# Patient Record
Sex: MALE | Race: WHITE | NOT HISPANIC OR LATINO | Employment: OTHER | URBAN - METROPOLITAN AREA
[De-identification: names, ages, dates, MRNs, and addresses within clinical notes are randomized per-mention and may not be internally consistent; named-entity substitution may affect disease eponyms.]

---

## 2017-03-23 ENCOUNTER — OFFICE VISIT (OUTPATIENT)
Dept: NEUROLOGY | Facility: CLINIC | Age: 62
End: 2017-03-23

## 2017-03-23 VITALS
BODY MASS INDEX: 22.23 KG/M2 | DIASTOLIC BLOOD PRESSURE: 83 MMHG | SYSTOLIC BLOOD PRESSURE: 144 MMHG | WEIGHT: 155.3 LBS | HEIGHT: 70 IN | HEART RATE: 78 BPM

## 2017-03-23 DIAGNOSIS — I45.10 RBBB: ICD-10-CM

## 2017-03-23 DIAGNOSIS — G20.A1 PARALYSIS AGITANS (H): ICD-10-CM

## 2017-03-23 DIAGNOSIS — G25.81 RESTLESS LEG SYNDROME: ICD-10-CM

## 2017-03-23 DIAGNOSIS — F29 PSYCHOSIS, UNSPECIFIED PSYCHOSIS TYPE (H): Primary | ICD-10-CM

## 2017-03-23 DIAGNOSIS — G20.A1 PARKINSON'S DISEASE (H): ICD-10-CM

## 2017-03-23 DIAGNOSIS — I45.2 BIFASCICULAR BLOCK: ICD-10-CM

## 2017-03-23 PROBLEM — R94.31 ABNORMAL ELECTROCARDIOGRAM: Status: ACTIVE | Noted: 2017-03-23

## 2017-03-23 PROBLEM — R94.31 PROLONGED Q-T INTERVAL ON ECG: Status: ACTIVE | Noted: 2017-03-23

## 2017-03-23 RX ORDER — CARBIDOPA AND LEVODOPA 25; 100 MG/1; MG/1
TABLET ORAL
Qty: 630 TABLET | Refills: 3 | Status: SHIPPED | OUTPATIENT
Start: 2017-03-23 | End: 2017-05-30

## 2017-03-23 RX ORDER — ROPINIROLE 4 MG/1
TABLET, FILM COATED, EXTENDED RELEASE ORAL
Qty: 90 TABLET | Refills: 3 | Status: SHIPPED | OUTPATIENT
Start: 2017-03-23 | End: 2017-06-29

## 2017-03-23 RX ORDER — QUETIAPINE FUMARATE 25 MG/1
25 TABLET, FILM COATED ORAL
Qty: 30 TABLET | Refills: 11 | Status: SHIPPED | OUTPATIENT
Start: 2017-03-23 | End: 2017-11-21

## 2017-03-23 RX ORDER — TRIHEXYPHENIDYL HYDROCHLORIDE 2 MG/1
TABLET ORAL
Qty: 360 TABLET | Refills: 3 | Status: SHIPPED | OUTPATIENT
Start: 2017-03-23 | End: 2017-06-28

## 2017-03-23 RX ORDER — CARBIDOPA AND LEVODOPA 50; 200 MG/1; MG/1
TABLET, EXTENDED RELEASE ORAL
Qty: 90 TABLET | Refills: 3 | Status: SHIPPED | OUTPATIENT
Start: 2017-03-23 | End: 2017-05-30

## 2017-03-23 ASSESSMENT — PAIN SCALES - GENERAL: PAINLEVEL: NO PAIN (0)
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
## 2017-03-23 NOTE — MR AVS SNAPSHOT
After Visit Summary   3/23/2017    Eamon Whalen    MRN: [id number]           Patient Information     Date Of Birth          1955        Visit Information        Provider Department      3/23/2017 2:40 PM Edison Villasenor MD [facility] Neurology        Today's Diagnoses     Psychosis, unspecified psychosis type    -  1    Paralysis agitans (H)        Parkinson's disease (H)        Restless leg syndrome          Care Instructions    PATIENT: Eamon Whalen    : 1955    MERCEDES: 2017    Parkinson  Tremors are better.   Was in the hospital in August  Was seen by me 2016.      Artane 2mg 3/day - may be 4/day  Take 1 tab (2mg) at 7 am, 11am-12noon, 4pm and may take 8-9pm   == 4/day    requip 4mg XL at 9pm  Sinemet 25/100 2-2-2-1  - at 7 am, 11am-12noon, 4pm and 8-9pm   Sinemet CR 50/200 at 8-9pm    avastatin  Eye drops    His older sister Christopher Mckeon    He came by [employer]    He has had unusual dreams - his words vs hallucinations    2 out of the 3 weekends - he states he comes in the house and has a goofy vision of a horror or film actor. This is something that he may see - but states it is a dream.   At breakfast time he has had people coming in and keeping an eye on him during the night.   There are people who are there but they won't talk to him and it is hard to get them out of the house.   He has these when he is awake     PLAN  ECG to check his QTc interval as antipsychotics such as seroquel or/and nuplazid can cause prolongation of this     May consider seroquel (quetiapine) at 25mg at night 8-9pm to see if this diminishes the hallucinations and if there are side effects such as sleepiness.      Alternatively may consider the use of nuplazid (pimavanserin) - will need Jeaneth's help when she returns next week    Continue on requip, sinemet, artane for now    Return back in 3 months with Roshni Gonzales NP            Follow-ups after your visit        Follow-up notes from  your care team     Return in about 6 months (around 9/23/2017).      Your next 10 appointments already scheduled     Jul 17, 2017  2:20 PM CDT   (Arrive by 2:05 PM)   Return Movement Disorder with K[facility] ([facility])    [address]  [address] [place] [id number] [phone]              Future tests that were ordered for you today     Open Future Orders        Priority Expected Expires Ordered    EKG 12-lead complete with read (future) Routine  3/23/2018 3/23/2017            Who to contact     Please call your clinic at [phone] to:    Ask questions about your health    Make or cancel appointments    Discuss your medicines    Learn about your test results    Speak to your doctor   If you have compliments or concerns about an experience at your clinic, or if you wish to file a complaint, please contact [facility] Patient Relations at [phone] or email us at [email or web address]         Additional Information About Your Visit        [employer] (acquired by [employer] in 2014) Information     [employer] gives you secure access to your electronic health record. If you see a primary care provider, you can also send messages to your care team and make appointments. If you have questions, please call your primary care clinic.  If you do not have a primary care provider, please call [phone] and they will assist you.      [employer] is an electronic gateway that provides easy, online access to your medical records. With [employer], you can request a clinic appointment, read your test results, renew a prescription or communicate with your care team.     To access your existing account, please contact your [facility] or call [phone] for assistance.        Care EveryWhere ID     This is your Care EveryWhere ID. This could be used by other organizations to access your [facility]  "records  [id number]        Your Vitals Were     Pulse Height BMI (Body Mass Index)             78 1.778 m (5' 10\") 22.28 kg/m2          Blood Pressure from Last 3 Encounters:   03/23/17 144/83   08/26/16 (!) 163/96   06/29/16 (!) 161/96    Weight from Last 3 Encounters:   03/23/17 70.4 kg (155 lb 4.8 oz)   08/26/16 70.3 kg (155 lb)   06/29/16 73.4 kg (161 lb 12.8 oz)                 Today's Medication Changes          These changes are accurate as of: 3/23/17  3:12 PM.  If you have any questions, ask your nurse or doctor.               Start taking these medicines.        Dose/Directions    pimavanserin 17 MG Tabs tablet   Commonly known as:  NUPLAZID   Used for:  Paralysis agitans (H), Psychosis, unspecified psychosis type   Started by:  Edison Villasenor MD        Dose:  34 mg   Take 2 tablets (34 mg) by mouth daily   Quantity:  180 tablet   Refills:  3       QUEtiapine 25 MG tablet   Commonly known as:  SEROQUEL   Used for:  Psychosis, unspecified psychosis type, Parkinson's disease (H), Paralysis agitans (H)   Started by:  Edison Villasenor MD        Dose:  25 mg   Take 1 tablet (25 mg) by mouth nightly as needed   Quantity:  30 tablet   Refills:  11         These medicines have changed or have updated prescriptions.        Dose/Directions    * carbidopa-levodopa  MG per CR tablet   Commonly known as:  SINEMET CR   This may have changed:  Another medication with the same name was changed. Make sure you understand how and when to take each.   Used for:  Paralysis agitans (H)   Changed by:  Edison Villasenor MD        1 tab @ 9pm   Quantity:  90 tablet   Refills:  3       * carbidopa-levodopa  MG per tablet   Commonly known as:  SINEMET   This may have changed:  additional instructions   Used for:  Paralysis agitans (H)   Changed by:  Edison Villasenor MD        2 tab at 7am, 11 am & 4 pm; 1 tablet at 8-9pm.    == 7/day   Quantity:  630 tablet   Refills:  3       Ropinirole HCl 4 MG Tb24 "   Commonly known as:  REQUIP XL   This may have changed:    - how much to take  - how to take this  - when to take this  - additional instructions   Used for:  Parkinson's disease (H), Restless leg syndrome   Changed by:  Edison Villasenor MD        1 tab at 8-9pm   Quantity:  90 tablet   Refills:  3       trihexyphenidyl 2 MG tablet   Commonly known as:  ARTANE   This may have changed:  additional instructions   Used for:  Paralysis agitans (H)   Changed by:  Edison Villasenor MD        Take 1 tab (2mg) at 7 am, 11am-12noon, 4pm and may take 8-9pm   == 4/day   Quantity:  360 tablet   Refills:  3       * Notice:  This list has 2 medication(s) that are the same as other medications prescribed for you. Read the directions carefully, and ask your doctor or other care provider to review them with you.         Where to get your medicines      These medications were sent to [id number], [place] - [address]  [address]) [place] [address]     Phone:  [phone]     carbidopa-levodopa  MG per tablet    carbidopa-levodopa  MG per CR tablet    QUEtiapine 25 MG tablet    Ropinirole HCl 4 MG Tb24    trihexyphenidyl 2 MG tablet         Some of these will need a paper prescription and others can be bought over the counter.  Ask your nurse if you have questions.     Bring a paper prescription for each of these medications     pimavanserin 17 MG Tabs tablet                Primary Care Provider Office Phone # Fax #    Bernardino John [phone]       [facility] [address]  [facility] [address]        Thank you!     Thank you for choosing [facility]  for your care. Our goal is always to provide you with excellent care. Hearing back from our patients is one way we can continue to improve our services. Please take a few minutes to complete the written survey that you may receive in the mail after your visit with us.  Thank you!             Your Updated Medication List - Protect others around you: Learn how to safely use, store and throw away your medicines at www.disposemymeds.org.          This list is accurate as of: 3/23/17  3:12 PM.  Always use your most recent med list.                   Brand Name Dispense Instructions for use    acetaminophen 325 MG tablet    TYLENOL    100 tablet    Take 2 tablets (650 mg) by mouth every 6 hours as needed for mild pain       Albuterol Sulfate 108 (90 BASE) MCG/ACT Aepb      Inhale 2 puffs into the lungs 4 times daily as needed       aspirin 325 MG tablet     180 tablet    Take 1 tablet (325 mg) by mouth daily       * carbidopa-levodopa  MG per CR tablet    SINEMET CR    90 tablet    1 tab @ 9pm       * carbidopa-levodopa  MG per tablet    SINEMET    630 tablet    2 tab at 7am, 11 am & 4 pm; 1 tablet at 8-9pm.    == 7/day       * ketoconazole 2 % shampoo    NIZORAL    120 mL    Apply to the affected area and wash off after 5 minutes.       * ketoconazole 2 % cream    NIZORAL    60 g    Apply topically daily Apply topical daily       NONFORMULARY      avastin injection of the right eye       pimavanserin 17 MG Tabs tablet    NUPLAZID    180 tablet    Take 2 tablets (34 mg) by mouth daily       QUEtiapine 25 MG tablet    SEROQUEL    30 tablet    Take 1 tablet (25 mg) by mouth nightly as needed       Ropinirole HCl 4 MG Tb24    REQUIP XL    90 tablet    1 tab at 8-9pm       trihexyphenidyl 2 MG tablet    ARTANE    360 tablet    Take 1 tab (2mg) at 7 am, 11am-12noon, 4pm and may take 8-9pm   == 4/day       XALATAN 0.005 % ophthalmic solution   Generic drug:  latanoprost     2.5 mL    Place 1 drop into the right eye At Bedtime One drop in the right eye in the morning and evening       ZANTAC 150 MG tablet   Generic drug:  ranitidine     180 tablet    Take 1 tablet (150 mg) by mouth 2 times daily       * Notice:  This list has 4 medication(s) that are the same as other medications  prescribed for you. Read the directions carefully, and ask your doctor or other care provider to review them with you.
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
## 2017-03-23 NOTE — PATIENT INSTRUCTIONS
PATIENT: Eamon Whalen    : 1955    MERCEDES: 2017    Parkinson  Tremors are better.   Was in the hospital in August  Was seen by me 2016.      Artane 2mg 3/day - may be 4/day  Take 1 tab (2mg) at 7 am, 11am-12noon, 4pm and may take 8-9pm   == 4/day    requip 4mg XL at 9pm  Sinemet 25/100 2-2-2-1  - at 7 am, 11am-12noon, 4pm and 8-9pm   Sinemet CR 50/200 at 8-9pm    avastatin  Eye drops    His older sister Christopher Mckeon    He came by [employer] taxi    He has had unusual dreams - his words vs hallucinations    2 out of the 3 weekends - he states he comes in the house and has a goofy vision of a horror or film actor. This is something that he may see - but states it is a dream.   At breakfast time he has had people coming in and keeping an eye on him during the night.   There are people who are there but they won't talk to him and it is hard to get them out of the house.   He has these when he is awake     PLAN  ECG to check his QTc interval as antipsychotics such as seroquel or/and nuplazid can cause prolongation of this     May consider seroquel (quetiapine) at 25mg at night 8-9pm to see if this diminishes the hallucinations and if there are side effects such as sleepiness.      Alternatively may consider the use of nuplazid (pimavanserin) - will need Jeaneth's help when she returns next week    Continue on requip, sinemet, artane for now    Return back in 3 months with Roshni Gonzales NP

## 2017-03-23 NOTE — NURSING NOTE
Chief Complaint   Patient presents with     RECHECK     P RETURN MOVEMENT DISORDER     Teresa Ag MA

## 2017-03-23 NOTE — Clinical Note
3/23/2017       RE: Eamon Whalen  [address]  [place] [address]     Dear Colleague,    Thank you for referring your patient, Eamon Whalen, to the [facility]UROLOG[facility] at [facility]. Please see a copy of my visit note below.    No notes on file    Again, thank you for allowing me to participate in the care of your patient.      Sincerely,    Edison Villasenor MD

## 2017-03-24 ENCOUNTER — TELEPHONE (OUTPATIENT)
Dept: NEUROLOGY | Facility: CLINIC | Age: 62
End: 2017-03-24

## 2017-03-24 DIAGNOSIS — I45.10 BUNDLE BRANCH BLOCK, RIGHT: Primary | ICD-10-CM

## 2017-03-24 DIAGNOSIS — I45.2 BIFASCICULAR BLOCK: ICD-10-CM

## 2017-03-24 NOTE — TELEPHONE ENCOUNTER
I received a message from Dr Villasenor regarding this patient's EKG results, stating that he has a bundle branch block; Dr Villasenor stated that the patient should not start the Seroquel that was prescribed to him, and to have a Cardiology consult placed; Dr Villasenor already placed Cardiology referral; I called Eamon and he knows not to take the Seroquel, although he states that he did take one last night; I advised him just to not take anymore; I told Eamon to call us if he has not heard from Cardiology to schedule an appointment by early next week.    Vicki Bazzi, MS RN Care Coordinator

## 2017-03-27 ENCOUNTER — TELEPHONE (OUTPATIENT)
Dept: NEUROLOGY | Facility: CLINIC | Age: 62
End: 2017-03-27

## 2017-03-27 NOTE — TELEPHONE ENCOUNTER
Spoke to patient and reiterated the importance of not starting the Seroquel. Told him that the EKG showed a bundle branch block that could interfere with his heart's rhythm and that Seroquel can also affect rhythm. He repeated back the instruction to not take any Seroquel.    He stated that it would be more convenient to see a cardiologist in [place]. Called [facility]'s referral system and faxed the referral, demographics, EKG and report, and Dr. Villasenor's last office visit note to [phone].    He talked about his troubles with the DMV and getting approval to drive. I told him that the best option is for him to have an  evaluation as Dr. Villasenor does not make decisions regarding driving ability for his patients. He verbalized understanding with this information.

## 2017-04-12 ENCOUNTER — TELEPHONE (OUTPATIENT)
Dept: NEUROLOGY | Facility: CLINIC | Age: 62
End: 2017-04-12

## 2017-04-28 ENCOUNTER — TELEPHONE (OUTPATIENT)
Dept: NEUROLOGY | Facility: CLINIC | Age: 62
End: 2017-04-28

## 2017-04-28 NOTE — TELEPHONE ENCOUNTER
----- Message from Jeaneht Abdullahi RN sent at 4/28/2017  9:34 AM CDT -----  Regarding: FW: Pt Received DMV Form; Requesting Call  Contact: [phone]  Please let him know that we don't do these letters. That he can have an independent driving evaluation done and he can submit it to the DMV. Dr. Villasenor may write a letter if the evaluation states he is safe to drive but he does not make that determination.    Thank you!  Jeaneth  ----- Message -----     From: Ashish Judd     Sent: 4/28/2017   9:27 AM       To: Jeaneth Abdullahi RN  Subject: Pt Received DMV Form; Requesting Call            Pt's #: [phone]  Pt of Dr. Villasenor    Pt called in requesting a call back to discuss a form he received from the DMV he has to submit to them by 5/5/17. The form states that he needs to have information from Dr. Villasenor stating he can safely operate a motor vehicle in order for him to keep his driving privileges. Please contact Pt to discuss.    Ashish Segura in the Call Center    Please DO NOT send this message and/or reply back to sender.  Call Center Representatives DO NOT respond to messages.

## 2017-05-02 NOTE — TELEPHONE ENCOUNTER
Patient calling about DMV form due 05/05/17.  Patient given DMV drivers evaluation phone number [phone].  Instructed to call DMV for a 30 day extension.  Patient lives in [place] and will visit local DMV for drivers evaluation   location near his home.  Instructed to fax approved drivers evaluation form to [facility] neurology  for Dr Villasenor.

## 2017-05-30 DIAGNOSIS — G20.A1 PARALYSIS AGITANS (H): ICD-10-CM

## 2017-05-30 RX ORDER — CARBIDOPA AND LEVODOPA 25; 100 MG/1; MG/1
TABLET ORAL
Qty: 630 TABLET | Refills: 1 | Status: SHIPPED | OUTPATIENT
Start: 2017-05-30 | End: 2017-07-05

## 2017-05-30 RX ORDER — CARBIDOPA AND LEVODOPA 50; 200 MG/1; MG/1
TABLET, EXTENDED RELEASE ORAL
Qty: 90 TABLET | Refills: 0 | Status: SHIPPED | OUTPATIENT
Start: 2017-05-30 | End: 2017-07-05

## 2017-06-01 ENCOUNTER — TELEPHONE (OUTPATIENT)
Dept: NEUROLOGY | Facility: CLINIC | Age: 62
End: 2017-06-01

## 2017-06-01 NOTE — TELEPHONE ENCOUNTER
[facility] Prior Authorization Team   Phone: [phone]  Fax: [phone]    PA Initiation    Medication: Ropinirole HCl (REQUIP XL) 4 MG TB24  Insurance Company: [employer] - Phone [phone] Fax [phone]  Pharmacy Filling the Rx: [id number] - [facility], [place] - [address] [place] [address]  Filling Pharmacy Phone: [phone]  Filling Pharmacy Fax:    Start Date: 6/1/2017

## 2017-06-05 NOTE — TELEPHONE ENCOUNTER
PRIOR AUTHORIZATION DENIED    Medication: Ropinirole HCl (REQUIP XL) 4 MG TB24 - DENIED    Denial Date: 6/3/2017    Denial Rational: PA has been denied as patient has only try/failed one alternative and must try one more drug: pramipexole IR tablets, ropinirole IR tablets. If an appeal is desired please let the PA team know when a letter of medical necessity has been written.        Appeal Information:

## 2017-06-06 NOTE — TELEPHONE ENCOUNTER
"Called patient to let him know where this is in the process. No answer, left voice mail with information on what is being done. Last two notes were printed and along with demographic sheet, faxed to [phone] marked \"URGENT Additional Information\" as I was instructed.  "

## 2017-06-06 NOTE — TELEPHONE ENCOUNTER
Called [employer] and the wait time is 26 minutes. They will call me back.    Patient has been on this medication since 2016:  Ropinirole HCl (REQUIP XL) 4 MG TB24 90 tablet 3 3/23/2017  No   Si tab at 8-9pm      He has been on:    Ropinirole HCl 8 MG TB24 (Discontinued) 180 tablet 3 2013 --   Si per day     Ropinirole HCl 12 MG TB24 (Discontinued) 90 tablet 3 2013 --   Sig: Take one daily     MIRAPEX 0.5 MG OR TABS (Discontinued) 90 0 2007 9/15/2011 --   Si TABLET 3 TIMES DAILY     Patient has been stable on the current med dose and type.

## 2017-06-06 NOTE — TELEPHONE ENCOUNTER
Spoke to Laya ROLLINS at [facility] she was not helpful and stated I have to submit some kind of documentation for an appeal. I asked for a direct number to speak with someone directly as the patient is out of his medication. She gave me the wrong number at [email or web address] it was their IT support number. IT gave me this number: [phone] to call.    Spoke with Jaycob at [email or web address] to see if I can get an override, no one can help me directly.    URGENT     Additional information to be faxed [phone]. This note will be faxed to them along with the documentation below.

## 2017-06-07 NOTE — TELEPHONE ENCOUNTER
Received fax from [employer] asking for the original prior authorization form and documentation to be sent. Again, faxed all documentation (11 pages).

## 2017-06-07 NOTE — TELEPHONE ENCOUNTER
Caller name: [facility] [place] [phone]      Called Snow and let her know that we are waiting for the approval from his insurance and that he will have to pay out of pocket per pill until we have it.

## 2017-06-15 NOTE — TELEPHONE ENCOUNTER
Called [employer] and spoke with rep Martinez. Rep stated that the information has been received and was submitted as an urgent appeal. Appeal is currently in process and being reviewed.

## 2017-06-19 ENCOUNTER — TELEPHONE (OUTPATIENT)
Dept: NEUROSURGERY | Facility: CLINIC | Age: 62
End: 2017-06-19

## 2017-06-19 NOTE — TELEPHONE ENCOUNTER
Received 43 pages from [facility] re: documentation for the prior authorization process of Requip for this patient. There were no instructions for us. Prior authorization is being handled by the PA team.

## 2017-06-28 DIAGNOSIS — G20.A1 PARALYSIS AGITANS (H): ICD-10-CM

## 2017-06-28 RX ORDER — TRIHEXYPHENIDYL HYDROCHLORIDE 2 MG/1
TABLET ORAL
Qty: 360 TABLET | Refills: 0 | Status: SHIPPED | OUTPATIENT
Start: 2017-06-28 | End: 2017-10-26

## 2017-06-28 NOTE — TELEPHONE ENCOUNTER
last appointment 03/23/17  next appointment 07/17/17    Patient completely out of medication and   waiting at pharmacy to pick it up.

## 2017-06-29 DIAGNOSIS — G25.81 RESTLESS LEG SYNDROME: ICD-10-CM

## 2017-06-29 DIAGNOSIS — G20.A1 PARKINSON'S DISEASE (H): ICD-10-CM

## 2017-06-29 RX ORDER — ROPINIROLE 4 MG/1
TABLET, FILM COATED, EXTENDED RELEASE ORAL
Qty: 30 TABLET | Refills: 11 | Status: SHIPPED | OUTPATIENT
Start: 2017-06-29 | End: 2017-11-21

## 2017-06-29 NOTE — TELEPHONE ENCOUNTER
Spoke with a representative at [facility] who stated that the appeal would be reviewed within 72 hours. I told her that this was unacceptable as we have already been waiting over 20 days for the review, the representative stated that she would have an answer today.

## 2017-06-30 NOTE — TELEPHONE ENCOUNTER
Representative Sky from [facility] stated that this appeal was missing information when faxed on 06/08/2017 and 06/15/2017. Sky stated that [facility] mailed a number of letters stating what information was missing to the clinic. The representative states that the appeal is under review however the appeal does not meet urgent status so it is still pending and will be following [facility] time frame for appeals. There is a great deal of confusion between the representatives at [facility] and those at [facility], they have not been helpful and do not seem to know who handles appeals or where they should be faxed.

## 2017-07-05 DIAGNOSIS — G20.A1 PARALYSIS AGITANS (H): ICD-10-CM

## 2017-07-05 RX ORDER — CARBIDOPA AND LEVODOPA 50; 200 MG/1; MG/1
TABLET, EXTENDED RELEASE ORAL
Qty: 90 TABLET | Refills: 0 | Status: SHIPPED | OUTPATIENT
Start: 2017-07-05 | End: 2017-10-26

## 2017-07-05 RX ORDER — CARBIDOPA AND LEVODOPA 25; 100 MG/1; MG/1
TABLET ORAL
Qty: 630 TABLET | Refills: 0 | Status: SHIPPED | OUTPATIENT
Start: 2017-07-05 | End: 2017-10-26

## 2017-07-06 ENCOUNTER — TELEPHONE (OUTPATIENT)
Dept: NEUROSURGERY | Facility: CLINIC | Age: 62
End: 2017-07-06

## 2017-07-06 NOTE — TELEPHONE ENCOUNTER
A letter was received from [facility] and [facility] and [employer] regarding this patient's appeal for approval of Ropinirole Hydrochloride 4 mg extended release tablets. The letter states that a request for a fast appeal can not be granted at this time. Fast appeals are only for times when normal deadlines could cause serious harm to a patient's health or hurt the ability to function. Letter scanned by this writer into this patient's chart.

## 2017-07-10 NOTE — TELEPHONE ENCOUNTER
PA Initiation    Medication: Ropinirole HCl (REQUIP XL) 4 MG TB24 - DENIED  Insurance Company: [employer] - Phone [phone] Fax [phone]  Pharmacy Filling the Rx: [id number], [place] - [address] [place] [address]  Filling Pharmacy Phone: [phone]  Filling Pharmacy Fax:    Start Date: 6/30/2017

## 2017-07-24 ENCOUNTER — TELEPHONE (OUTPATIENT)
Dept: NEUROSURGERY | Facility: CLINIC | Age: 62
End: 2017-07-24

## 2017-07-24 NOTE — TELEPHONE ENCOUNTER
Letter received from [facility] and [facility] and [employer]- appeal was submitted for the denial of ropinirole hydrochloride by this insurance company. Appeal was approved. The drug is approved for this patient for one year from July 10th, 2017 to July 10th, 2018. Letter scanned into patient's chart by this writer.

## 2017-09-15 ENCOUNTER — TELEPHONE (OUTPATIENT)
Dept: NEUROLOGY | Facility: CLINIC | Age: 62
End: 2017-09-15

## 2017-09-15 NOTE — TELEPHONE ENCOUNTER
"Called patient to discuss. Patient believes himself to be on a \"hefty\" dose of meds at bedtime with the carbidopa/levodopa  CR and the ropinirole XL 4 mg.    November 30 appointment with Dr. Villasenor. Tremor is gone, \"it's a miracle.\" \"I've been doing very well and I want to keep it that way.\"    Patient could not focus on what the issue is that he was calling about. 15 minutes spent on call.  "

## 2017-09-15 NOTE — TELEPHONE ENCOUNTER
----- Message from Tangela Mulligan sent at 9/13/2017  8:32 AM CDT -----  Regarding: Pt has questions  Contact: [phone]  Pt Ph # [phone],    Pt of Dr Villasenor,    Pt requests a call back from you, Pt has questions, he has a medication question about what he should and should not be taking and he also wants to know if the [facility] has any kind of driving test or driving asssement to see if it is still medically safe for him to drive,    ThanksTangela in Call Center    Please DO NOT send this message and/or reply back to sender.  Call Center Representatives DO NOT respond to messages.

## 2017-09-25 ENCOUNTER — TELEPHONE (OUTPATIENT)
Dept: NEUROLOGY | Facility: CLINIC | Age: 62
End: 2017-09-25

## 2017-09-25 DIAGNOSIS — G20.A1 PARALYSIS AGITANS (H): Primary | ICD-10-CM

## 2017-09-25 NOTE — TELEPHONE ENCOUNTER
Spoke to patient regarding his carbidopa/levodopa  and his ropinirole 4 mg XL. He does not want to lose more brain cells. I explained that the medications do not cause a loss of brain cells but rather the Parkinson's disease progresses and more dopamine producing cells are lost. I stated that he will need to come to his appointment in November with a list of questions and 1 or 2 goals for the visit.

## 2017-09-25 NOTE — TELEPHONE ENCOUNTER
----- Message from Kayla Madrigal LPN sent at 9/21/2017  3:51 PM CDT -----  Regarding: request  Contact: [phone]  Caller name: patient [phone]    Treating provider/specialty: [place]    Nurse:    Best time to return call:    Message left?     Description of issue/question:  patient has asked many times for a letter  to be sent to [facility]. Patient has discussed this issue with MD Villasenor before.  Doesn't have a DMV form but needs a letter sent to state   saying that he can now drive and no further visual issues and parkinsons.  patient has no tremor now and is doing great.  Said his drivers license is 'on hold' presently.   fax to [phone].  Please mail letter copy to home address.  Call patient when sent.

## 2017-09-25 NOTE — TELEPHONE ENCOUNTER
"----- Message from Sis Nunez sent at 9/21/2017 11:27 AM CDT -----  Regarding: PT NEEDS LETTER W/OK TO DRIVE  Contact: [phone]  Call from PT stating that there is going to be a request sent to Dr. Villasenor for a letter stating that PT is OK to drive.  PT is asking \"that the letter remain positive as he needs to be able to spend time with his family and grandchildren.\"  Please call PT at [phone].      Thank You,  Sis    Please DO NOT send this message and/or reply back to sender.  Call Center Representatives DO NOT respond to messages.      "

## 2017-09-25 NOTE — TELEPHONE ENCOUNTER
Called patient to discuss. He does not understand why he can't drive when everyone else seems to be able to drive with one eye. States he had the evaluation in the [facility], he did not pass. I explained that Dr. Villasenor cannot write a letter stating he is safe to drive since he has no idea if he is or not. Patient needs a comprehensive driving evaluation. Gave him the number to [facility]  Evaluations: [phone]. Began discussing what the assessment involves:    Assessment Services    The two-part comprehensive  assessment consists of clinical and in-vehicle assessments.    Clinical assessment    Vision testing  Reaction time screening  Memory and problem solving  Upper and lower body strength and coordination  Cognitive processing skills  In-vehicle services    Assessment in a minivan or sedan  Prescription for adaptive driving equipment  Private driving lessons in a van or sedan  Vehicle use for the State road test.  All information is kept confidential, but we encourage involvement with your physician and family. With your permission, your physician is given a copy of your completed assessment results.

## 2017-09-25 NOTE — TELEPHONE ENCOUNTER
----- Message from Kayla Madrigal LPN sent at 9/25/2017 10:12 AM CDT -----  Regarding: questions  Contact: [phone]  Caller name: [phone]    Treating provider/specialty: Stephanie/Hamilton    Nurse:    Best time to return call: anytime     Message left?     Description of issue/question:  regarding SINEMET CR)  MG  and ropenerole takes 1 each at HS -has occassional imbalance from ropinerole.  doesn't want to be dependent on sleeping medications.

## 2017-09-25 NOTE — TELEPHONE ENCOUNTER
Patient requested an order for a  evaluation sent to [facility]. Order and demographic sheet sent to [phone] (fax number received from [facility] ).

## 2017-09-25 NOTE — TELEPHONE ENCOUNTER
----- Message from Valentina Fisher sent at 9/21/2017  1:42 PM CDT -----  Regarding: Letter from Dr Villasenor on his driving  Contact: [phone]  Pt license has suspending and he is requesting that Dr Villasenor writes a letter on his behalf stating that it is okay for him to drive. I notice that you had a phone encounter on 09/15/2017 but I did not know if letter is being written or what I should tell pt. Please call back pt at [phone] to discuss. Thanks      MB    Please DO NOT send this message and/or reply back to sender.  Call Center Representatives DO NOT respond to messages.

## 2017-09-30 DIAGNOSIS — G20.A1 PARALYSIS AGITANS (H): ICD-10-CM

## 2017-10-01 DIAGNOSIS — G20.A1 PARALYSIS AGITANS (H): ICD-10-CM

## 2017-10-04 RX ORDER — TRIHEXYPHENIDYL HYDROCHLORIDE 2 MG/1
TABLET ORAL
Qty: 120 TABLET | Refills: 0 | OUTPATIENT
Start: 2017-10-04

## 2017-10-04 RX ORDER — CARBIDOPA AND LEVODOPA 50; 200 MG/1; MG/1
TABLET, EXTENDED RELEASE ORAL
Qty: 30 TABLET | Refills: 0 | OUTPATIENT
Start: 2017-10-04

## 2017-10-17 ENCOUNTER — TELEPHONE (OUTPATIENT)
Dept: NEUROLOGY | Facility: CLINIC | Age: 62
End: 2017-10-17

## 2017-10-17 NOTE — TELEPHONE ENCOUNTER
Not happy with his medications and how they are handling his balance. He believes the Requip lessened the trouble with balance. I explained that there really are not medications designed to help with balance but rather this is most likely his Parkinson's disease progressing unfortunately. He will want to discuss     1. Balance problems   2. Driving    I offered him an earlier appointment with Dr. Villasenor on 10/31 at 7:40 for 30 minutes. He will call back and let the call center know if he can confirm that appointment. If so, the call center should cancel the appointment on 11/30.

## 2017-10-26 DIAGNOSIS — G20.A1 PARALYSIS AGITANS (H): ICD-10-CM

## 2017-10-26 RX ORDER — CARBIDOPA AND LEVODOPA 25; 100 MG/1; MG/1
TABLET ORAL
Qty: 630 TABLET | Refills: 0 | Status: SHIPPED | OUTPATIENT
Start: 2017-10-26 | End: 2017-11-21

## 2017-10-26 RX ORDER — TRIHEXYPHENIDYL HYDROCHLORIDE 2 MG/1
TABLET ORAL
Qty: 120 TABLET | Refills: 0 | Status: SHIPPED | OUTPATIENT
Start: 2017-10-26 | End: 2017-11-21

## 2017-10-26 RX ORDER — CARBIDOPA AND LEVODOPA 50; 200 MG/1; MG/1
TABLET, EXTENDED RELEASE ORAL
Qty: 90 TABLET | Refills: 0 | Status: SHIPPED | OUTPATIENT
Start: 2017-10-26 | End: 2017-11-21

## 2017-11-06 ENCOUNTER — TELEPHONE (OUTPATIENT)
Dept: NEUROLOGY | Facility: CLINIC | Age: 62
End: 2017-11-06

## 2017-11-06 NOTE — TELEPHONE ENCOUNTER
"Patient called to share that he has an upcoming appointment with Dr. Villasenor on 11/16/2017 and that he would like to address his instability and driving because he is trying to preserve as much of his \"normal life\" as he can.  He acknowledges that his health is changing but feels that people in his community think he should be \"institutionalized\".  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  He notes that [place] comes from [place] and [place] farm.  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  His purpose for calling today was to share these concerns with someone in preparation for his upcoming appointment.      Writer advised patient to write a list of things that are important to him (ie: farm, driving, etc.) and a list of what his worries or concerns are.  Informed patient that our goal is to support him in living a safe and healthy life and it's important for us to have an open discussion about how we can best do this.  Patient agrees and appreciation for the ideas.  States that he will see Dr. Villasenor at his upcoming appointment.       Writer will pass this information along to Dr. Villasenor and his nurse, Jeaneth.      "

## 2017-11-17 ENCOUNTER — TELEPHONE (OUTPATIENT)
Dept: NEUROLOGY | Facility: CLINIC | Age: 62
End: 2017-11-17

## 2017-11-17 NOTE — TELEPHONE ENCOUNTER
----- Message from Charley Jones sent at 2017  8:42 AM CST -----  [employer] FORM FOR TRANSPORTATION  Contact: [phone]  Good Morning and Happy Friday!!        Eamon is requesting his  form for his transportation be signed by Dr. Villasenor. It's for the distance he travels for his appointments. He can be reached at [phone] (ok to leave a voicemail).  He is really anxious about it, since his appointment is 17.      Thanks!  Vee in Call Center

## 2017-11-17 NOTE — TELEPHONE ENCOUNTER
"Returned patient's call. He requested I send a transportation 30/60 exemption form to [facility] in order for him to have transportation to medical appointments [place] the \"[place]\". This was done and faxed to [facility] [phone].  "

## 2017-11-21 ENCOUNTER — OFFICE VISIT (OUTPATIENT)
Dept: NEUROLOGY | Facility: CLINIC | Age: 62
End: 2017-11-21

## 2017-11-21 VITALS
WEIGHT: 145 LBS | BODY MASS INDEX: 21.48 KG/M2 | HEIGHT: 69 IN | HEART RATE: 92 BPM | SYSTOLIC BLOOD PRESSURE: 141 MMHG | DIASTOLIC BLOOD PRESSURE: 78 MMHG

## 2017-11-21 DIAGNOSIS — I45.2 BIFASCICULAR BLOCK: ICD-10-CM

## 2017-11-21 DIAGNOSIS — F29 PSYCHOSIS, UNSPECIFIED PSYCHOSIS TYPE (H): ICD-10-CM

## 2017-11-21 DIAGNOSIS — G25.81 RESTLESS LEG SYNDROME: ICD-10-CM

## 2017-11-21 DIAGNOSIS — G20.A1 PARALYSIS AGITANS (H): ICD-10-CM

## 2017-11-21 DIAGNOSIS — G20.A1 PARKINSON'S DISEASE (H): Primary | ICD-10-CM

## 2017-11-21 RX ORDER — ROPINIROLE 4 MG/1
TABLET, FILM COATED, EXTENDED RELEASE ORAL
Qty: 90 TABLET | Refills: 3 | Status: SHIPPED | OUTPATIENT
Start: 2017-11-21 | End: 2018-02-20

## 2017-11-21 RX ORDER — TRIHEXYPHENIDYL HYDROCHLORIDE 2 MG/1
TABLET ORAL
Qty: 120 TABLET | Refills: 0 | Status: SHIPPED | OUTPATIENT
Start: 2017-11-21 | End: 2017-11-29

## 2017-11-21 RX ORDER — CARBIDOPA AND LEVODOPA 50; 200 MG/1; MG/1
TABLET, EXTENDED RELEASE ORAL
Qty: 90 TABLET | Refills: 3 | Status: SHIPPED | OUTPATIENT
Start: 2017-11-21 | End: 2018-02-20

## 2017-11-21 RX ORDER — QUETIAPINE FUMARATE 25 MG/1
25 TABLET, FILM COATED ORAL
Qty: 30 TABLET | Refills: 11 | COMMUNITY
Start: 2017-11-21 | End: 2018-02-20

## 2017-11-21 RX ORDER — CARBIDOPA AND LEVODOPA 25; 100 MG/1; MG/1
TABLET ORAL
Qty: 630 TABLET | Refills: 3 | Status: SHIPPED | OUTPATIENT
Start: 2017-11-21 | End: 2018-02-20

## 2017-11-21 ASSESSMENT — PAIN SCALES - GENERAL: PAINLEVEL: MILD PAIN (3)

## 2017-11-21 NOTE — TELEPHONE ENCOUNTER
"#1 Goal of visit today () is get the right meds at the right level - to decrease hallucinations and control Parkinson's disease symptoms.    4 weeks ago he had \"a buggaboo\". Boxes are all over and displaced due to roof being worked on. He wanted to get the people he was seeing to the [employer]. He wanted to make it clear that he was not going to help them anymore. The people are red, full grown adults, close to 10. At some points he has insight, other times he does not.    trihexyphenidyl (ARTANE) 2 MG tablet 120 tablet 0 10/26/2017  No   Sig: Take 1 tab (2mg) at 7 am, 11am-12noon, 4pm and may take 8-9pm   == 4/day    He is taking the above as ordered    Ropinirole HCl (REQUIP XL) 4 MG TB24 30 tablet 11 2017  No   Si tab at 8-9pm    He is taking the above as ordered    QUEtiapine (SEROQUEL) 25 MG tablet 30 tablet 11 3/23/2017  --   Sig: Take 1 tablet (25 mg) by mouth nightly as needed    He is not taking Seroquel - never started    pimavanserin (NUPLAZID) 17 MG TABS tablet 180 tablet 3 3/23/2017  --   Sig: Take 2 tablets (34 mg) by mouth daily    He is not taking Nuplazid - never started    carbidopa-levodopa (SINEMET CR)  MG per CR tablet 90 tablet 0 10/26/2017  No   Si tab @ 9pm    He is taking this as ordered    carbidopa-levodopa (SINEMET)  MG per tablet 630 tablet 0 10/26/2017  No   Si tab at 7am, 11 am & 4 pm; 1 tablet at 8-9pm.    == 7/day    He is taking this as ordered    Parkinson's disease symptoms are being handled very well.  "

## 2017-11-21 NOTE — Clinical Note
11/21/2017       RE: Eamon Whalen  [id number]  [place] [address]     Dear Colleague,    Thank you for referring your patient, Eamon Whalen, to the [facility] at [facility]. Please see a copy of my visit note below.    No notes on file    Again, thank you for allowing me to participate in the care of your patient.      Sincerely,    Edison Villasenor MD

## 2017-11-21 NOTE — PATIENT INSTRUCTIONS
PATIENT: Eamon Whalen  61 year old male     : 1955    MERCEDES: 2017    Acetaminophen  Albuterol  Aspirin  Sinemet carbidopa/levodopa CR 50/200 at bedtime   Sinemet carbidopa/levodopa 25/100 2-2-2-1  nizoral shampoo  nizoral cream  Latanoprost (xalatn) 0.005%  avastin injecdtion  pimavanserin (nuplazid) 17mg x 2 - never started   Quetiapine (seroquel) 25mg at night - never started.   Rantidine (zantac) 150mg 2/day  Ropinirole XL 4mg at 8-9pm  Trihexyphenidyl 2mg 7am 11-12 4pm 8-9pm     Present regimen     Meds                   7a 11a 4p 8-9p 9p       50/200 sinemet        1         25/100  sinemet 2 2  2  1            requip XL 4mg ropinirole       1         trihexy 2mg artane 1 1 1 1         seroquel 25mg    Not taking      pimavanserin 17mg    Not taking                             Over 50% of this visit was spent in patient care and care coordination.     History obtained from patient    Total visit time was 25 minutes    PLAN  Reduce the artane (trihexyphenidyl) from 1 tablet 4 times per day every 3 days by one tablet and go off this medication eventually. So reduce by one tablet of artane every 3 days. So go from   1-1-1-1  (now) to   1-1-1 () to   1-1 () to   1 () to   0 ()    Meds                   7a 11a 4p 8-9p 9p       50/200 sinemet        1         25/100  sinemet 2 2  2  1            requip XL 4mg ropinirole       1         trihexy 2mg artane 1->0 1->0 1->0 1->0         seroquel 25mg quetiapine    Not taking      pimavanserin 17mg  nuplazid    Not taking      Rivastigmine patch  exelon patch    Not taking                   Hold off on starting seroquel (quetiapine) for now.   Hold off on starting pimavanserin (nuplazid) for now   Call us back in early December to report how you are doing off the artane.  If you have more tremor or problems while your artane dose is being reduced call us or send us a mychart message.   Return back in 1-3  months.   Keep the sinemet 25/100 and sinemet 50/200 dose the same  Keep the ropinirole/requip XL dose the same.   In future will discuss rivastigmine (exelon) patch for memory issues.     Edison Villasenor MD

## 2017-11-21 NOTE — TELEPHONE ENCOUNTER
----- Message from Flory Marcus sent at 11/20/2017  1:58 PM CST -----  Regarding: Pt would like a call back re: tomorrow's appt  Contact: [phone]  Pt calling in very nervous about being able to be seen by Dr. Villasenor tomorrow. Per pt, he states that he is worried that, due to his 30/60 form not being faxed to [facility] by all his doctors, he will have to miss his appt tomorrow. I assured him that you had faxed the form on Friday at 9:08 but he would like a call back if at all possible from you to reassure him that he will be able to be seen tomorrow.  Pt can be reached at [phone].    Thank you!  ~Flory    Please DO NOT send this message and/or reply back to sender. Call Center Representatives DO NOT respond to messages.

## 2017-11-21 NOTE — PROGRESS NOTES
Diagnosis/Summary/Recommendations:    PATIENT: Eamon Whalen  61 year old male     : 1955    MERCEDES: 2017    Acetaminophen  Albuterol  Aspirin  Sinemet carbidopa/levodopa CR 50/200 at bedtime   Sinemet carbidopa/levodopa 25/100 2-2-2-1  nizoral shampoo  nizoral cream  Latanoprost (xalatn) 0.005%  avastin injecdtion  pimavanserin (nuplazid) 17mg x 2 - never started   Quetiapine (seroquel) 25mg at night - never started.   Rantidine (zantac) 150mg 2/day  Ropinirole XL 4mg at 8-9pm  Trihexyphenidyl 2mg 7am 11-12 4pm 8-9pm     Present regimen     Meds                   7a 11a 4p 8-9p 9p       50/200 sinemet        1         25/100  sinemet 2 2  2  1            requip XL 4mg ropinirole       1         trihexy 2mg artane 1 1 1 1         seroquel 25mg    Not taking      pimavanserin 17mg    Not taking                             Over 50% of this visit was spent in patient care and care coordination.     History obtained from patient    Total visit time was 25 minutes    PLAN  Reduce the artane (trihexyphenidyl) from 1 tablet 4 times per day every 3 days by one tablet and go off this medication eventually. So reduce by one tablet of artane every 3 days. So go from   1-1-1-1  (now) to   1-1-1 () to   1-1 () to   1 () to   0 ()    Meds                   7a 11a 4p 8-9p 9p       50/200 sinemet        1         25/100  sinemet 2 2  2  1            requip XL 4mg ropinirole       1         trihexy 2mg artane 1->0 1->0 1->0 1->0         seroquel 25mg quetiapine    Not taking      pimavanserin 17mg  nuplazid    Not taking      Rivastigmine patch  exelon patch    Not taking                   Hold off on starting seroquel (quetiapine) for now.   Hold off on starting pimavanserin (nuplazid) for now   Call us back in early December to report how you are doing off the artane.  If you have more tremor or problems while your artane dose is being reduced call us or send us a  [employer] message.   Return back in 1-3 months.   Keep the sinemet 25/100 and sinemet 50/200 dose the same  Keep the ropinirole/requip XL dose the same.   In future will discuss rivastigmine (exelon) patch for memory issues.     Needs cardiology consult for bifascicular block.    Edison Villasenor MD     ______________________________________    Last visit date and details:     : 1955     MERCEDES:   No Show     2017     Parkinson     Acetaminophen  Albuterol  Aspirin  Sinemet CR 50/200  Sinemet 25/100  nizoral shampoo  nizoral cream  Latanoprost (xalatn) 0.005%  avastin injecdtion  pimavanserin (nuplazid) 17mg x 2   Quetiapine (seroquel) 25mg at night  Rantidine (zantac) 150mg 2/day  Ropinirole XL 4mg at 8-9pm  Trihexyphenidyl 2mg 7am 11-12 4pm 8-9pm     Meds                                                                                                                 Over 50% of this visit was spent in patient care and care coordination.      History obtained from patient     Total visit time was 0 minutes        Edison Villasenor MD      ______________________________________     Last visit date and details:      Diagnosis/Summary/Recommendations:      PATIENT: Eamon Whalen      : 1955      MERCEDSE: 2017      Parkinson  Tremors are better.   Was in the hospital in August  Was seen by me 2016.          Artane 2mg 3/day - may be 4/day  Take 1 tab (2mg) at 7 am, 11am-12noon, 4pm and may take 8-9pm   == 4/day      requip 4mg XL at 9pm  Sinemet 25/100 2-2-2-1  - at 7 am, 11am-12noon, 4pm and 8-9pm   Sinemet CR 50/200 at 8-9pm      avastatin  Eye drops      His older sister Christopher Mckeon      He came by [employer] taxi      He has had unusual dreams - his words vs hallucinations      2 out of the 3 weekends - he states he comes in the house and has a goofy vision of a horror or film actor. This is something that he may see - but states it is a dream.   At breakfast time he has had people coming in and  "keeping an eye on him during the night.   There are people who are there but they won't talk to him and it is hard to get them out of the house.   He has these when he is awake       PLAN  ECG to check his QTc interval as antipsychotics such as seroquel or/and nuplazid can cause prolongation of this       May consider seroquel (quetiapine) at 25mg at night 8-9pm to see if this diminishes the hallucinations and if there are side effects such as sleepiness.        Alternatively may consider the use of nuplazid (pimavanserin) - will need Jeaneth's help when she returns next week      Continue on requip, sinemet, artane for now      Return back in 3 months with Roshni Gonzales NP      Because of his bifascicular block/rbb he should not start on seroquel for now and may consider cardiology consultation and possible echocardiogram and additional studies if appropriate      Edison Villasenor MD      #1 Goal of visit today () is get the right meds at the right level - to decrease hallucinations and control Parkinson's disease symptoms.     4 weeks ago he had \"a buggaboo\". Boxes are all over and displaced due to roof being worked on. He wanted to get the people he was seeing to the [employer]. He wanted to make it clear that he was not going to help them anymore. The people are red, full grown adults, close to 10. At some points he has insight, other times he does not.     trihexyphenidyl (ARTANE) 2 MG tablet 120 tablet 0 10/26/2017   No   Sig: Take 1 tab (2mg) at 7 am, 11am-12noon, 4pm and may take 8-9pm   == 4/day                           He is taking the above as ordered     Ropinirole HCl (REQUIP XL) 4 MG TB24 30 tablet 11 2017   No   Si tab at 8-9pm                           He is taking the above as ordered     QUEtiapine (SEROQUEL) 25 MG tablet 30 tablet 11 3/23/2017   --   Sig: Take 1 tablet (25 mg) by mouth nightly as needed                           He is not taking Seroquel - never " started     pimavanserin (NUPLAZID) 17 MG TABS tablet 180 tablet 3 3/23/2017   --   Sig: Take 2 tablets (34 mg) by mouth daily                           He is not taking Nuplazid - never started     carbidopa-levodopa (SINEMET CR)  MG per CR tablet 90 tablet 0 10/26/2017   No   Si tab @ 9pm                           He is taking this as ordered     carbidopa-levodopa (SINEMET)  MG per tablet 630 tablet 0 10/26/2017   No   Si tab at 7am, 11 am & 4 pm; 1 tablet at 8-9pm.    == 7/day                           He is taking this as ordered     Parkinson's disease symptoms are being handled very well.          ______________________________________      Patient was asked about 14 Review of systems including changes in vision (dry eyes, double vision), hearing, heart, lungs, musculoskeletal, depression, anxiety, snoring, RBD, insomnia, urinary frequency, urinary urgency, constipation, swallowing problems, hematological, ID, allergies, skin problems: seborrhea, endocrinological: thyroid, diabetes, cholesterol; balance, weight changes, and other neurological problems and these were not significant at this time except for   Patient Active Problem List   Diagnosis     Paralysis agitans (H)     Wears glasses     Balance problems     Constipation     GERD (gastroesophageal reflux disease)     Urinary urgency     Insomnia     Seborrhea     Lumbago     Hearing loss     Family history of Parkinson's disease     Central retinal vein occlusion     Choroidal nevus     Vision problem -- Right Eye     ED (erectile dysfunction)     Glaucoma     Abnormal electrocardiogram     Bifascicular block     Prolonged Q-T interval on ECG          Allergies   Allergen Reactions     Cats      Dogs      Past Surgical History:   Procedure Laterality Date     NO HISTORY OF SURGERY       Past Medical History:   Diagnosis Date     Abnormal electrocardiogram 3/23/2017     Balance problems      Bifascicular block 3/23/2017     Central  retinal vein occlusion 2014     Choroidal nevus 2014     Constipation      Family history of Parkinson's disease      GERD (gastroesophageal reflux disease)      Glaucoma 2015     Hearing loss      Lumbago      Parkinson's disease (H)      Prolonged Q-T interval on ECG 3/23/2017     Urinary urgency      Wears glasses      Social History     Social History     Marital status: Single     Spouse name: N/A     Number of children: N/A     Years of education: N/A     Occupational History     farm Self Employed.     Social History Main Topics     Smoking status: Never Smoker     Smokeless tobacco: Never Used      Comment: cigar sometimes     Alcohol use Yes      Comment: occ     Drug use: No     Sexual activity: Yes     Partners: Female     Other Topics Concern     Not on file     Social History Narrative    1 daughter who is  and lives in [place]    She was pregnant with the 1st child and had some problems in the 6-7 month of pregnancy with     Baby that may have hydrocephalus as there was IUGR. The baby  after 3 months in 2011. She was encouraged her to undergo genetic testing which was negative and they are considering having more children.     He met a woman and may  a woman from [place] - who has [place] roots. 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 He met her 3 times. Her name is Tavia and lives in [place]. They will be visiting within a day or so with her mother. She is 36 yrs old. She is not going back there.     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 Depending on the status of the main house may build new house.         [place] in his home town had parkinson    [place] [facility] [place].         This was way before pesticides        Maternal grandfather     Dax Sorenson - Turkish name     He  "would have to be brought out to a rocking chair    \"hardening of there arteries.         mother       Drug and lactation database from the [place] National Library of Medicine:  http://toxnet.nlm.nih.gov/cgi-bin/sis/htmlgen?LACT                  B/P: Data Unavailable, T: Data Unavailable, P: Data Unavailable, R: Data Unavailable 0 lbs 0 oz  There were no vitals taken for this visit., There is no height or weight on file to calculate BMI.  Medications and Vitals not listed above were documented in the cart and reviewed by me.     Current Outpatient Prescriptions   Medication Sig Dispense Refill     latanoprost (XALATAN) 0.005 % ophthalmic solution One drop in the right eye in the morning and evening 2.5 mL 1     trihexyphenidyl (ARTANE) 2 MG tablet Take 1 tab (2mg) at 7 am, 11am-12noon, 4pm and may take 8-9pm   == 4/day 120 tablet 0     carbidopa-levodopa (SINEMET)  MG per tablet 2 tab at 7am, 11 am & 4 pm; 1 tablet at 8-9pm.    == 7/day 630 tablet 0     carbidopa-levodopa (SINEMET CR)  MG per CR tablet 1 tab @ 9pm 90 tablet 0     Ropinirole HCl (REQUIP XL) 4 MG TB24 1 tab at 8-9pm 30 tablet 11     QUEtiapine (SEROQUEL) 25 MG tablet Take 1 tablet (25 mg) by mouth nightly as needed 30 tablet 11     pimavanserin (NUPLAZID) 17 MG TABS tablet Take 2 tablets (34 mg) by mouth daily 180 tablet 3     acetaminophen (TYLENOL) 325 MG tablet Take 2 tablets (650 mg) by mouth every 6 hours as needed for mild pain 100 tablet      Albuterol Sulfate 108 (90 BASE) MCG/ACT AEPB Inhale 2 puffs into the lungs 4 times daily as needed       ranitidine (ZANTAC) 150 MG tablet Take 1 tablet (150 mg) by mouth 2 times daily 180 tablet 3     aspirin 325 MG tablet Take 1 tablet (325 mg) by mouth daily 180 tablet      ketoconazole (NIZORAL) 2 % shampoo Apply to the affected area and wash off after 5 minutes. 120 mL 11     ketoconazole (NIZORAL) 2 % cream Apply topically daily Apply topical daily 60 g 11     NONFORMULARY avastin " injection of the right eye           Edison Villasenor MD

## 2017-11-21 NOTE — MR AVS SNAPSHOT
After Visit Summary   2017    Eamon Whalen    MRN: [id number]           Patient Information     Date Of Birth          1955        Visit Information        Provider Department      2017 10:40 AM Edison Villasenor MD [facility] Neurology        Today's Diagnoses     Parkinson's disease (H)    -  1    Paralysis agitans (H)        Restless leg syndrome        Psychosis, unspecified psychosis type        Bifascicular block          Care Instructions    PATIENT: Eamon Whalen  61 year old male     : 1955    MERCEDES: 2017    Acetaminophen  Albuterol  Aspirin  Sinemet carbidopa/levodopa CR 50/200 at bedtime   Sinemet carbidopa/levodopa 25/100 2-2-2-1  nizoral shampoo  nizoral cream  Latanoprost (xalatn) 0.005%  avastin injecdtion  pimavanserin (nuplazid) 17mg x 2 - never started   Quetiapine (seroquel) 25mg at night - never started.   Rantidine (zantac) 150mg 2/day  Ropinirole XL 4mg at 8-9pm  Trihexyphenidyl 2mg 7am 11-12 4pm 8-9pm     Present regimen     Meds                   7a 11a 4p 8-9p 9p       50/200 sinemet        1         25/100  sinemet 2 2  2  1            requip XL 4mg ropinirole       1         trihexy 2mg artane 1 1 1 1         seroquel 25mg    Not taking      pimavanserin 17mg    Not taking                             Over 50% of this visit was spent in patient care and care coordination.     History obtained from patient    Total visit time was 25 minutes    PLAN  Reduce the artane (trihexyphenidyl) from 1 tablet 4 times per day every 3 days by one tablet and go off this medication eventually. So reduce by one tablet of artane every 3 days. So go from   1-1-1-1  (now) to   1-1-1 () to   1-1 () to   1 () to   0 ()    Meds                   7a 11a 4p 8-9p 9p       50/200 sinemet        1         25/100  sinemet 2 2  2  1            requip XL 4mg ropinirole       1         trihexy 2mg artane 1->0 1->0 1->0 1->0          seroquel 25mg quetiapine    Not taking      pimavanserin 17mg  nuplazid    Not taking      Rivastigmine patch  exelon patch    Not taking                   Hold off on starting seroquel (quetiapine) for now.   Hold off on starting pimavanserin (nuplazid) for now   Call us back in early December to report how you are doing off the artane.  If you have more tremor or problems while your artane dose is being reduced call us or send us a [employer] message.   Return back in 1-3 months.   Keep the sinemet 25/100 and sinemet 50/200 dose the same  Keep the ropinirole/requip XL dose the same.   In future will discuss rivastigmine (exelon) patch for memory issues.     Edison Villasenor MD           Follow-ups after your visit        Additional Services     CARDIOLOGY EVAL ADULT REFERRAL       Your provider has referred you to: cardiology      Please be aware that coverage of these services is subject to the terms and limitations of your health insurance plan.  Call member services at your health plan with any benefit or coverage questions.      Type of Referral:  New Cardiology Consult    Timeframe requested:  At next appointment    Please bring the following to your appointment:  >>   Any x-rays, CTs or MRIs which have been performed.  Contact the facility where they were done to arrange for  prior to your scheduled appointment.    >>   List of current medications  >>   This referral request   >>   Any documents/labs given to you for this referral                  Follow-up notes from your care team     Return in about 3 months (around 2/21/2018).      Your next 10 appointments already scheduled     Feb 20, 2018 12:30 PM CST   (Arrive by 12:15 PM)   New Patient Visit with Abraham Dover MD   [facility] ([facility])    [address] [place] [address] [id number] [phone]            Feb 20, 2018  2:40 PM CST   (Arrive by 2:25 PM)   Return Movement Disorder with  "Edison Villasenor MD   [facility] Neurology ([facility])    [address] [facility] [address] [phone]              Who to contact     Please call your clinic at [phone] to:    Ask questions about your health    Make or cancel appointments    Discuss your medicines    Learn about your test results    Speak to your doctor   If you have compliments or concerns about an experience at your clinic, or if you wish to file a complaint, please contact [facility] Patient Relations at [phone] or email us at [email or web address]         Additional Information About Your Visit        [employer] Information     [facility] gives you secure access to your electronic health record. If you see a primary care provider, you can also send messages to your care team and make appointments. If you have questions, please call your primary care clinic.  If you do not have a primary care provider, please call [phone] and they will assist you.      [facility] is an electronic gateway that provides easy, online access to your medical records. With [facility], you can request a clinic appointment, read your test results, renew a prescription or communicate with your care team.     To access your existing account, please contact your [facility] Clinic or call [phone] for assistance.        Care EveryWhere ID     This is your Care EveryWhere ID. This could be used by other organizations to access your [place] medical records  [id number]        Your Vitals Were     Pulse Height BMI (Body Mass Index)             92 1.753 m (5' 9\") 21.41 kg/m2          Blood Pressure from Last 3 Encounters:   11/21/17 141/78   03/23/17 144/83   08/26/16 (!) 163/96    Weight from Last 3 Encounters:   11/21/17 65.8 kg (145 lb)   03/23/17 70.4 kg (155 lb 4.8 oz)   08/26/16 70.3 kg (155 lb)              We Performed the Following     CARDIOLOGY " EVAL ADULT REFERRAL          Today's Medication Changes          These changes are accurate as of: 11/21/17 11:19 AM.  If you have any questions, ask your nurse or doctor.               These medicines have changed or have updated prescriptions.        Dose/Directions    trihexyphenidyl 2 MG tablet   Commonly known as:  ARTANE   This may have changed:  additional instructions   Used for:  Paralysis agitans (H)   Changed by:  Edison Villasenor MD        Reduce the artane (trihexyphenidyl) from 1 tablet 4 times per day every 3 days by one tablet and go off this medication eventually. So reduce by one tablet of artane every 3 days. So go from  1-1-1-1  (now) to 1-1-1 (24th November) to 1-1 (27 November) to 1 (30 November) to 0 (2nd December)   Quantity:  120 tablet   Refills:  0            Where to get your medicines      These medications were sent to [id number] - [facility], [place] - [address]) [place] [address]     Phone:  [phone]     carbidopa-levodopa  MG per tablet    carbidopa-levodopa  MG per CR tablet    Ropinirole HCl 4 MG Tb24         Some of these will need a paper prescription and others can be bought over the counter.  Ask your nurse if you have questions.     Bring a paper prescription for each of these medications     trihexyphenidyl 2 MG tablet                Primary Care Provider Office Phone # Fax #    Bernardino John [phone]       [facility] [address]  [facility] [address]        Equal Access to Services     EVERETT MUÑOZ AH: Hadii dalton betancourto Sofernando, waaxda luqadaha, qaybta kaalmada adeegyamadalyn, paco miranda. So [facility] [phone].    ATENCIÓN: Si habla español, tiene a olmstead disposición servicios gratuitos de asistencia lingüística. Delbert al [phone].    We comply with applicable federal civil rights laws and [place] laws. We do not discriminate on the  basis of race, color, national origin, age, disability, sex, sexual orientation, or gender identity.            Thank you!     Thank you for choosing [facility] NEUROLOGY  for your care. Our goal is always to provide you with excellent care. Hearing back from our patients is one way we can continue to improve our services. Please take a few minutes to complete the written survey that you may receive in the mail after your visit with us. Thank you!             Your Updated Medication List - Protect others around you: Learn how to safely use, store and throw away your medicines at www.disposemymeds.org.          This list is accurate as of: 11/21/17 11:19 AM.  Always use your most recent med list.                   Brand Name Dispense Instructions for use Diagnosis    acetaminophen 325 MG tablet    TYLENOL    100 tablet    Take 2 tablets (650 mg) by mouth every 6 hours as needed for mild pain        Albuterol Sulfate 108 (90 BASE) MCG/ACT Aepb      Inhale 2 puffs into the lungs 4 times daily as needed        aspirin 325 MG tablet     180 tablet    Take 1 tablet (325 mg) by mouth daily        * carbidopa-levodopa  MG per tablet    SINEMET    630 tablet    2 tab at 7am, 11 am & 4 pm; 1 tablet at 8-9pm.    == 7/day    Paralysis agitans (H)       * carbidopa-levodopa  MG per CR tablet    SINEMET CR    90 tablet    1 tab @ 9pm    Paralysis agitans (H)       * ketoconazole 2 % shampoo    NIZORAL    120 mL    Apply to the affected area and wash off after 5 minutes.    Paralysis agitans (H), Seborrhea       * ketoconazole 2 % cream    NIZORAL    60 g    Apply topically daily Apply topical daily    Seborrhea, Paralysis agitans (H)       NONFORMULARY      avastin injection of the right eye        NUPLAZID 17 MG Tabs tablet   Generic drug:  pimavanserin     180 tablet    Take 2 tablets (34 mg) by mouth daily    Paralysis agitans (H), Psychosis, unspecified psychosis type       Ropinirole HCl 4 MG Tb24    REQUIP XL     90 tablet    1 tab at 8-9pm    Parkinson's disease (H), Restless leg syndrome       SEROQUEL 25 MG tablet   Generic drug:  QUEtiapine     30 tablet    Take 1 tablet (25 mg) by mouth nightly as needed    Psychosis, unspecified psychosis type, Parkinson's disease (H), Paralysis agitans (H)       trihexyphenidyl 2 MG tablet    ARTANE    120 tablet    Reduce the artane (trihexyphenidyl) from 1 tablet 4 times per day every 3 days by one tablet and go off this medication eventually. So reduce by one tablet of artane every 3 days. So go from  1-1-1-1  (now) to 1-1-1 (24th November) to 1-1 (27 November) to 1 (30 November) to 0 (2nd December)    Paralysis agitans (H)       XALATAN 0.005 % ophthalmic solution   Generic drug:  latanoprost     2.5 mL    One drop in the right eye in the morning and evening        ZANTAC 150 MG tablet   Generic drug:  ranitidine     180 tablet    Take 1 tablet (150 mg) by mouth 2 times daily    Paralysis agitans (H), Esophageal reflux       * Notice:  This list has 4 medication(s) that are the same as other medications prescribed for you. Read the directions carefully, and ask your doctor or other care provider to review them with you.

## 2017-11-28 ENCOUNTER — TELEPHONE (OUTPATIENT)
Dept: NEUROLOGY | Facility: CLINIC | Age: 62
End: 2017-11-28

## 2017-11-28 DIAGNOSIS — G20.A1 PARALYSIS AGITANS (H): Primary | ICD-10-CM

## 2017-11-28 NOTE — TELEPHONE ENCOUNTER
Called patient and left voice mail that he is probably feeling weak due to going off the Artane cold turkey. Advised him to be patient and that feeling should get better as his body gets used to being off of the Artane. Asked him to call back with any questions or concerns.

## 2017-11-28 NOTE — TELEPHONE ENCOUNTER
----- Message from Tangela Mulligan sent at 11/28/2017  8:11 AM CST -----  Regarding: Pt of Dr Villasenor  Contact: [phone]  Pt Ph # [phone], Pt of Dr Villasenor,    Pt saw Dr Villasenor on 11/21/17, he said Dr Villasenor set up a plan for Pt to change his medication,    Pt didn't follow that the way they discussed, Dr Villasenor wanted him to Taper off of it, but he went completely off of it,    Pt did not continue on Artane and has completely come off of that for about a week, Pt did not refill his Artane, Pt said he has not had any more hallucinations since stopping the Artane, So he is going to completely stay off of that, He thinks that is positive,    Pt feels weakened though since going off of that drug and wanted you to know that as well,    Pt requests call back to discuss the weakness,    Thank you,  Tangela  [facility]    Please DO NOT send this message and/or reply back to sender.  Call Center Representatives DO NOT respond to messages.

## 2017-11-29 NOTE — TELEPHONE ENCOUNTER
Called patient back to discuss his symptoms. Patient states he is having a lot of trouble in last few days with stability, however he is no longer seeing the people hallucinations. He does not want to go back to the Artane. He is very happy to not be having hallucinations.     1. He got a new mattress, Tempurpedic, but he can't move around - he will be replacing it with a mattress with springs.  2. Trouble with being able to sleep - tired all day, sits down and falls asleep.  3. When he gets up to walk, he is unstable.    Advised him on the followin. If he starts expecting problems he will have problems.   2. Explained that there will be good days and not so good days. I invited him to think of each new day as a new day with good things ahead.  3. Never ever, walk backward. He said this is where he has his instability trouble. I told him that he is not allowed to walk backward anymore.  4. He is agreeable to physical therapy for balance issues and would like the order sent to a place in [place]. He will call to let me know the name of the facility.

## 2017-11-29 NOTE — TELEPHONE ENCOUNTER
----- Message from Flory Velazquez sent at 11/29/2017  9:20 AM CST -----  Regarding: Pt experiencing new symptoms/calling for advice  Contact: [phone]  Pt of Dr. Villasenor calling needing some advice. Pt states that since 11/23, he has been off Artane because he forgot it over Thanksgiving and then was told by his pharmacist that he wouldn't need to keep taking it. Since then, he has not experienced tremors but he states that he is having balance issues and has starting falling. Pt states the falling started today but he has fallen several times.  Pt would appreciate a call back with advice about what to do.  Pt can be reached at [phone].    Thank you!  ~Flory    Please DO NOT send this message and/or reply back to sender. Call Center Representatives DO NOT respond to messages.

## 2017-12-26 ENCOUNTER — TELEPHONE (OUTPATIENT)
Dept: NEUROLOGY | Facility: CLINIC | Age: 62
End: 2017-12-26

## 2017-12-26 NOTE — TELEPHONE ENCOUNTER
----- Message from Vicki Bazzi RN sent at 12/26/2017  9:36 AM CST -----  Regarding: FW: Jeaneth Abdullahi Pt asking for a call back about medication issues.   Contact: [phone]      ----- Message -----     From: Luann Bianchi     Sent: 12/26/2017   9:30 AM       To: [facility]  Subject: Jeaneth Abdullahi Pt asking for a call back about m#    Pt calling asking for a call back from jeaneth about medication issues before and during his colonoscopy that is scheduled for 1/18/18. Per pt he is unsure if he should take the medication because he's getting cleaned out and is not sure if the medication will get clean out durning his colonoscopy. Pt does not want to disrupt his medication dosages for the medication has been working very well for him.   Pt can be reached at [phone]     Thank You,   Luann     Please DO NOT send this message and/or reply back to sender.  Call Center Representatives DO NOT respond to messages.

## 2017-12-26 NOTE — TELEPHONE ENCOUNTER
Left voice mail that I believe it is important to continue his Parkinson's disease medication regimen but that the gastroenterologist will be the one to approve him taking or not taking his pills before his procedure. I stated that he needs to run this by that provider.

## 2018-02-09 ENCOUNTER — TELEPHONE (OUTPATIENT)
Dept: NEUROLOGY | Facility: CLINIC | Age: 63
End: 2018-02-09

## 2018-02-09 NOTE — TELEPHONE ENCOUNTER
Called patient back per his request. Left voice mail that he needs to reschedule with cardiology because we need to know he is safe to be on Nuplazid and Seroquel as both can cause a slowing of his heart rhythm.

## 2018-02-09 NOTE — TELEPHONE ENCOUNTER
----- Message from Aileen Call sent at 2/2/2018 10:44 AM CST -----  Johnson Eric,    Cardiology had to cancel this pt's appt because the provider will not be in clinic on the day he was originally scheduled. Based on the message below, it looks like this patient wants a call from Dr. Villasenor prior to rescheduling with cardiology. Would you be able to assist?    Thanks,  Aileen    ----- Message -----     From: Dee Dee Bahena     Sent: 2/2/2018   9:39 AM       To: Aileen Call    I spoke with you about this patient a couple weeks ago. I just talked to the patient today and he would like a call from you guys to see what he should do. I had to cancel the appointment with Dr. Dover and unfortunately, the only other general cardiologist in on Tuesday had an opening at 8:30 am.     Thank you,     Dee Dee

## 2018-02-14 NOTE — TELEPHONE ENCOUNTER
----- Message from Vicki Bazzi RN sent at 2/12/2018  2:57 PM CST -----  Regarding: FW: Dr. Villasenor pt calling with symptoms making it so he can barely walk  Contact: [phone]      ----- Message -----     From: Luann Bianchi     Sent: 2/12/2018   2:43 PM       To: Wil Fitzpatrick RN, Stacey Penn RN, #  Subject: Dr. Villasenor pt calling with symptoms making it#    Pt calling with symptoms of shaking in the legs, per pt both legs are shaking but the left leg has extreme shaking. Per pt he can barely walk and there is heaviness in both legs equally. Per pt this started about a week ago and has gotten progressively worse. Pt medication have changed lately and is not sure if this is the reason for these symptoms. Pt is asking for a call back.  Pt can be reached at [phone]    Thank You,  Luann    Please DO NOT send this message and/or reply back to sender.  Call Center Representatives DO NOT respond to messages.

## 2018-02-14 NOTE — TELEPHONE ENCOUNTER
Called patient back to discuss. Left voice mail that he needs to see his primary care provider, urgent care or go to the emergency department today to have a thorough neurological check and check for any infectious process.

## 2018-02-19 NOTE — PROGRESS NOTES
Diagnosis/Summary/Recommendations:    PATIENT: Eamon Whalen  62 year old male     : 1955    MERCEDES: 2018    Parkinson  He has shuffling and freezing of gait - primarily on initiating his gait.     emg study results are probably artifactual as he does not appear to have clinical features that support the electrical findings.     Biopsy of his prostate pending tomorrow    He is urinating during the night.   Last night he slept through the night.   He wants to get a rocker and can sleep with th is.   His mattress - he is trying to return as he has nocturnal akinesia that is affecting his mobility    He goes to bed at 1030pm  Wakes up at 2-3am to  Urinate and has problems going back to sleep   He shuffles during the night.   He was unable to get comfortable and may not be able to fall back asleep  Before he was waking up at 630/7am  Now he is waking up at 530am.   If he is uncomfortable he gets up and gets out the bed and falls asleep in a chair in the kitchen if he can fall asleep but is not rested with this position or sleeps on a  but it is too small to sleep on.     His 7am dose - not necessary having clear off or on periods but when asked he has periods where he is shuffling and times when he is moving well but cannot designate the times.     He has not been on rytary         Medications     7am 11am 4pm 8-9pm 1030pm 2-3am   Acetaminophen tylenol 325mg Not taking        Albuterol sulfate inhaler Not taking        Aspirin 325mg Not this week        Carbidopa/levodopa 50/200 CR Sinemet    1     Carbidopa/levodopa 25/100 Sinemet 2 2 2 1 Goes to bed 1   Dorzolamide timolol   Yellow cap ?correct medication?  1 drop both eyes        Ibuprofen 200mg As needed        Ketoconazole cream         Ketoconazole shampoo         Latanoprost xalatan ophthalmic solution  ?correct?    1 drop both eyes evening     nonformulary avastin Stopped for now        Pimavanserin nuplazid 17mg Not taking         Quetiapine seroquel 25mg Not taking        Ranitidine zantac 150mg Not taking         Ropinirole Requip XL 4mg    1       History obtained from patient     PLAN  Seborrhea -   ketoconazole shampoo   ketoconazole cream   Derma smoothe use once per week    Getting a prostate biopsy by Dr. Goldberg tomorrow as his psa was high. He had a mri  He has had a psa of 10. He is off aspirin.    He has fatigue  Not sure if he has had cbc, sed rate, crp, thyroid, paraneoplastic panel, metabolic screen, vitamin b12/mma and folate as we dont have access to records. May want to review with his pcp    His emg is abnormal but this is probably artifactual.     He has a new mattress  Suspect that he has  Nocturnal akinesia and that with the night time dose of sinemet this may help.   He will add a night time sinemet to see if helps his mobility through the night and when he wakes up in the morning.     Remains off the antipsychotics    He came by blue ride - transportation issue    Consider a visit with Sheree Neumann, Pharm D to discuss medication options besides sinemet above and down the road consider rytary to replace his sinemet regimen if needed.     Return visit in 6 months.     Coding statement:   Duration of  Services: patient care and care coordination was 25 minutes  Greater than 50% of this visit was spent in counseling and coordination of care.     Edison Villasenor MD     ______________________________________    Last visit date and details:     [facility]  Electrodiagnostic Laboratory    Nerve Conduction & EMG Report    Patient:       Emaon Whalen  Patient ID:    [id number]  Gender:        Male  YOB: 1955  Age:           62 Years 2 Months      History & Examination:    62 year old man with history of Parkinson's disease, restless leg syndrome, and paresthesias in feet and legs. Query polyneuropathy. His clinical examination shows vibration of about 10 seconds at the toes and medial malleoli (both  normal), normal light touch and position sensation at the toes, without right-vs-left side differences.    Techniques: Motor conduction studies were done with surface recording electrodes. Sensory conduction studies were performed with surface electrodes, unless indicated otherwise by (n), designating the use of subdermal recording electrodes. Temperature was monitored and recorded throughout the study. Upper extremities were maintained at a temperature of 32 degrees Centigrade or higher.  Lower extremities were maintained at a temperature of 31degrees Centigrade or higher. EMG was done with a concentric needle electrode.     Results:    Right sural antidromic sensory NCS was normal. Left sural SNAP was absent; the study was attempted with surface and needle subdermal recording electrodes, multiple times. Left superficial peroneal SNAP was absent. Right superficial peroneal sensory NCS showed attenuated SNAP amplitude and normal conduction velocity. Bilateral deep peroneal and the right tibial motor NCSs were normal. Right tibial F-wave latencies were normal. EMG of bilateral tibialis anterior, left medial gastrocnemius, and left vastus medialis was normal. EMG of right medial gastrocnemius showed increased insertional activity and 2+ fasciculations, but no sustained fibrillations/positive waves, and MUP morphology and recruitment patterns were normal. EMG of right vastus lateralis showed no abnormal spontaneous activity, and some large, long duration, stable MUPs with normal recruitment patterns.     Interpretation:    1. Abnormalities of sensory nerve conduction studies of uncertain significance. Possibilities include technical problems, a left sural sensory mononeuropathy, or an asymmetric sensory polyneuropathy/mononeuropathy multiplex. Please see comment.   2. Mild chronic neurogenic changes at the right vastus lateralis. Differential diagnosis includes right chronic L4 radiculopathy vs femoral neuropathy.      Comment: The fact that the clinical sensory examination at the ankles and toes is symmetric makes it less likely that the asymmetric findings of the sensory nerve conduction studies are clinically significant or relevant. No obvious technical problem was identified during the stimulation. Please note that superficial peroneal sensory studies in individuals over age 60 are often challenging and a number of healthy adults above that age may show absent responses. A diagnosis of polyneuropathy is not possible without clear clinical correlation in this case.     EMG Physician:    MD Joel Isabel Elizabeth J, O.T. - 01/30/2018 10:30 AM CST  Formatting of this note may be different from the original.  Consults     [facility] [place]  Occupational Therapy Outpatient 's Evaluation     SUBJECTIVE     Referring Provider: Bernardino John M.D.  Rehab Diagnosis:   1. Parkinson Disease (HCC)   2.  Exam     Onset Date: 03/27/17  Payor: [employer] [place] CARE / Plan: [facility] [employer] HMO / Product Type: Medicaid HMO /     PERTINENT MEDICAL / SURGICAL HISTORY:  Patient Active Problem List   Diagnosis     Parkinson Disease (HCC)      Exam     No past surgical history on file.    Subjective     Pt is a 62 y.o. year old male referred for assessment of his component skills as they relate to driving safety. Pt was referred due to Parkinson Disease and revocation of license. [place]. he underwent this same evaluation in May 2017, in which he was not recommended to drive, due to severe impairments found on all components of the assessment. Pt attended this evaluation alone, reporting that a brother in law dropped him off. Pt reports that no matter what the results, he needs to return to driving. 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 Patient does wear glasses. Pt does not feel he has any concerns for driving and has been very upset at the difficulty of this process. Pt previous level of function is IND with ADLs and IADLs. Per last driving evaluation, pt has had his license revoked previously and had been charged 5 times with driving after revocation.     Objective  Pt was assessed for component physical, cognitive, and visual skills. Results as follows:     Physical: Pt has functional ROM and strength demonstrated in neck, trunk, and upper and lower extremities. He does present with significant kyphosis.     Cognitive: Pt was given the [employer] Driving Battery, a specialized driving assessment that addresses core neuropsychological skills necessary for safe driving and can be used as a tool to aid in decision-making about a person s ability to drive. Pt. participated in this assessment on this date. Results as follows:  Profile Score Pass/Fail  Perceptual Analysis  Incomplete Letters 2 F  Position Discrimination 0 P  Cube Analysis 2 F  Attention  Es and Fs - Visual Attention 2 F  Es and Fs ( threes ) - Divided Attention 2 F  Praxis Skills  Copying, Gesture and Objects 0 P  Tapping and Sequencing 0 P  Executive Functioning  Key Search Test 2 P  Action Program Test 2 F  Rule Shift Cards Test 2 F  Sorting Test 0 P  Comprehension  Comprehension Test 0 P    Overall battery score: 14  Total number of tests failed: 6  Scores between 6 and 10 are considered borderline and indicate risk for driving. Pts. results on this assessment indicate patient is unsafe to drive, and would be associated with a greater than 90% chance of failing an on the road assessment.    Reaction time was measured using the [employer] 2000. Pt scored a 38 on Mode A, which demonstrates a delay in reaction time. Pt scored 22 on Mode B/Divided Attention while correctly identifying 2/10 of the additional stimuli. This would indicate unsafe range in divided  attention.    Assessment   Recommendations: Pt presents with functional strength and range of motion to operate a motor vehicle. Pt s visual reaction time demonstrated as unsafe for highway speeds as did his pedal to brake reaction time. Divided attention tested as impaired. Cognitive testing indicates that there are significant cognitive concerns related to driving. Based on the testing performed today, pt presents with decreased reaction time, impaired divided attention and impaired cognition, that would indicate decreased safety in relation to driving. Recommend that pt no longer drive and follow up with his physician. No further OT is indicated at this time in this setting. Pt was instructed in findings and recommendations. Pt was instructed in safety concerns for driving demonstrated today. Pt was able to verbalize understanding.     Goals  Pt and/or family will verbalize understanding of findings and recommendations after 1 visit.    Plan     Treatment Interventions: Self-care/home management  OT Duration: 1x eval   Plan: Discontinue therapy    Time Spent with Patient  OT Evaluation (min): 30 min        Home Management Training (min): 30 min     Total Timed Units (min): 30 min  Total Treatment Time (min): 60 min    Provider signature: _________________________________ Date:_______________  Print name: _______________________________________          PATIENT: Eamon Whalen  61 year old male      : 1955     MERCEDES: 2017     Acetaminophen  Albuterol  Aspirin  Sinemet carbidopa/levodopa CR 50/200 at bedtime   Sinemet carbidopa/levodopa 25/100 2-2-2-1  nizoral shampoo  nizoral cream  Latanoprost (xalatn) 0.005%  avastin injecdtion  pimavanserin (nuplazid) 17mg x 2 - never started   Quetiapine (seroquel) 25mg at night - never started.   Rantidine (zantac) 150mg 2/day  Ropinirole XL 4mg at 8-9pm  Trihexyphenidyl 2mg 7am 11-12 4pm 8-9pm      Present regimen      Meds                   7a 11a 4p 8-9p  9p        50/200 sinemet           1           25/100  sinemet 2 2  2  1               requip XL 4mg ropinirole         1           trihexy 2mg artane 1 1 1 1           seroquel 25mg       Not taking         pimavanserin 17mg       Not taking                                                Over 50% of this visit was spent in patient care and care coordination.      History obtained from patient     Total visit time was 25 minutes     PLAN  Reduce the artane (trihexyphenidyl) from 1 tablet 4 times per day every 3 days by one tablet and go off this medication eventually. So reduce by one tablet of artane every 3 days. So go from   1-1-1-1  (now) to   1-1-1 (24th November) to   1-1 (27 November) to   1 (30 November) to   0 (2nd December)     Meds                   7a 11a 4p 8-9p 9p        50/200 sinemet           1           25/100  sinemet 2 2  2  1               requip XL 4mg ropinirole         1           trihexy 2mg artane 1->0 1->0 1->0 1->0           seroquel 25mg quetiapine       Not taking         pimavanserin 17mg  nuplazid       Not taking         Rivastigmine patch  exelon patch       Not taking                              Hold off on starting seroquel (quetiapine) for now.   Hold off on starting pimavanserin (nuplazid) for now   Call us back in early December to report how you are doing off the artane.  If you have more tremor or problems while your artane dose is being reduced call us or send us a [facility] message.   Return back in 1-3 months.   Keep the sinemet 25/100 and sinemet 50/200 dose the same  Keep the ropinirole/requip XL dose the same.   In future will discuss rivastigmine (exelon) patch for memory issues.      Needs cardiology consult for bifascicular block.     Edison Villasenor MD            ______________________________________      Patient was asked about 14 Review of systems including changes in vision (dry eyes, double vision), hearing, heart, lungs, musculoskeletal, depression, anxiety,  snoring, RBD, insomnia, urinary frequency, urinary urgency, constipation, swallowing problems, hematological, ID, allergies, skin problems: seborrhea, endocrinological: thyroid, diabetes, cholesterol; balance, weight changes, and other neurological problems and these were not significant at this time except for   Patient Active Problem List   Diagnosis     Paralysis agitans (H)     Wears glasses     Balance problems     Constipation     GERD (gastroesophageal reflux disease)     Urinary urgency     Insomnia     Seborrhea     Lumbago     Hearing loss     Family history of Parkinson's disease     Central retinal vein occlusion     Choroidal nevus     Vision problem -- Right Eye     ED (erectile dysfunction)     Glaucoma     Abnormal electrocardiogram     Bifascicular block     Prolonged Q-T interval on ECG          Allergies   Allergen Reactions     Cats      Dogs      Past Surgical History:   Procedure Laterality Date     NO HISTORY OF SURGERY       Past Medical History:   Diagnosis Date     Abnormal electrocardiogram 3/23/2017     Balance problems      Bifascicular block 3/23/2017     Central retinal vein occlusion 4/11/2014     Choroidal nevus 4/11/2014     Constipation      Family history of Parkinson's disease      GERD (gastroesophageal reflux disease)      Glaucoma 6/4/2015     Hearing loss      Lumbago      Parkinson's disease (H)      Prolonged Q-T interval on ECG 3/23/2017     Urinary urgency      Wears glasses      Social History     Social History     Marital status: Single     Spouse name: N/A     Number of children: N/A     Years of education: N/A     Occupational History     farm Self Employed.     Social History Main Topics     Smoking status: Never Smoker     Smokeless tobacco: Never Used      Comment: cigar sometimes     Alcohol use Yes      Comment: occ     Drug use: No     Sexual activity: Yes     Partners: Female     Other Topics Concern     Not on file     Social History Narrative    1 daughter  "who is  and lives in [place]    She was pregnant with the 1st child and had some problems in the 6-7 month of pregnancy with     Baby that may have hydrocephalus as there was IUGR. The baby  after 3 months in 2011. She was encouraged her to undergo genetic testing which was negative and they are considering having more children.     He met a woman and may  a woman from [place] - who has [place] roots. 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 He met her 3 times. Her name is Tavia and lives in [facility]. They will be visiting within a day or so with her mother. She is 36 yrs old. She is not going back there.     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 Depending on the status of the main house may build new house.         [place] in his home town had parkinson    [place] lived [place] [facility] -- [place].         This was way before pesticides        Maternal grandfather     Dax Sorenson - [place] name     He would have to be brought out to a rocking chair    \"hardening of there arteries.         mother       Drug and lactation database from the [place] National Library of Medicine:  http://toxnet.nlm.nih.gov/cgi-bin/sis/htmlgen?LACT      B/P: Data Unavailable, T: Data Unavailable, P: Data Unavailable, R: Data Unavailable 0 lbs 0 oz  There were no vitals taken for this visit., There is no height or weight on file to calculate BMI.  Medications and Vitals not listed above were documented in the cart and reviewed by me.     Current Outpatient Prescriptions   Medication Sig Dispense Refill     carbidopa-levodopa (SINEMET)  MG per tablet 2 tab at 7am, 11 am & 4 pm; 1 tablet at 8-9pm.    == 7/day 630 tablet 3     carbidopa-levodopa (SINEMET CR)  MG per CR tablet 1 tab @ 9pm 90 tablet 3     Ropinirole HCl (REQUIP XL) 4 " MG TB24 1 tab at 8-9pm 90 tablet 3     pimavanserin (NUPLAZID) 17 MG TABS tablet Take 2 tablets (34 mg) by mouth daily 180 tablet 3     QUEtiapine (SEROQUEL) 25 MG tablet Take 1 tablet (25 mg) by mouth nightly as needed 30 tablet 11     latanoprost (XALATAN) 0.005 % ophthalmic solution One drop in the right eye in the morning and evening 2.5 mL 1     acetaminophen (TYLENOL) 325 MG tablet Take 2 tablets (650 mg) by mouth every 6 hours as needed for mild pain 100 tablet      Albuterol Sulfate 108 (90 BASE) MCG/ACT AEPB Inhale 2 puffs into the lungs 4 times daily as needed       ranitidine (ZANTAC) 150 MG tablet Take 1 tablet (150 mg) by mouth 2 times daily 180 tablet 3     aspirin 325 MG tablet Take 1 tablet (325 mg) by mouth daily 180 tablet      ketoconazole (NIZORAL) 2 % shampoo Apply to the affected area and wash off after 5 minutes. 120 mL 11     ketoconazole (NIZORAL) 2 % cream Apply topically daily Apply topical daily 60 g 11     NONFORMULARY avastin injection of the right eye           Edison Villasenor MD

## 2018-02-20 ENCOUNTER — OFFICE VISIT (OUTPATIENT)
Dept: NEUROLOGY | Facility: CLINIC | Age: 63
End: 2018-02-20
Payer: COMMERCIAL

## 2018-02-20 ENCOUNTER — OFFICE VISIT (OUTPATIENT)
Dept: PHARMACY | Facility: CLINIC | Age: 63
End: 2018-02-20
Payer: COMMERCIAL

## 2018-02-20 VITALS
BODY MASS INDEX: 22.62 KG/M2 | DIASTOLIC BLOOD PRESSURE: 100 MMHG | HEIGHT: 69 IN | OXYGEN SATURATION: 99 % | WEIGHT: 152.7 LBS | SYSTOLIC BLOOD PRESSURE: 168 MMHG | HEART RATE: 78 BPM

## 2018-02-20 DIAGNOSIS — L21.9 SEBORRHEA: ICD-10-CM

## 2018-02-20 DIAGNOSIS — G25.81 RESTLESS LEG SYNDROME: ICD-10-CM

## 2018-02-20 DIAGNOSIS — R20.0 NUMBNESS AND TINGLING OF BOTH LEGS: ICD-10-CM

## 2018-02-20 DIAGNOSIS — H40.89 OTHER GLAUCOMA OF BOTH EYES: ICD-10-CM

## 2018-02-20 DIAGNOSIS — M54.17 LUMBOSACRAL RADICULOPATHY AT L4: ICD-10-CM

## 2018-02-20 DIAGNOSIS — R94.130 ABNORMAL NERVE CONDUCTION STUDIES: Primary | ICD-10-CM

## 2018-02-20 DIAGNOSIS — G20.A1 PARKINSON'S DISEASE (H): Primary | ICD-10-CM

## 2018-02-20 DIAGNOSIS — G20.A1 PARALYSIS AGITANS (H): ICD-10-CM

## 2018-02-20 DIAGNOSIS — R20.2 NUMBNESS AND TINGLING OF BOTH LEGS: ICD-10-CM

## 2018-02-20 DIAGNOSIS — G20.A1 PARALYSIS AGITANS (H): Primary | ICD-10-CM

## 2018-02-20 PROBLEM — Z02.4 ENCOUNTER FOR EXAMINATION FOR DRIVING LICENSE: Status: ACTIVE | Noted: 2018-02-20

## 2018-02-20 PROBLEM — R97.20 ELEVATED PROSTATE SPECIFIC ANTIGEN (PSA): Status: ACTIVE | Noted: 2018-02-20

## 2018-02-20 PROBLEM — Z92.89 HISTORY OF EMG: Status: ACTIVE | Noted: 2018-02-20

## 2018-02-20 PROCEDURE — 99605 MTMS BY PHARM NP 15 MIN: CPT | Performed by: PHARMACIST

## 2018-02-20 PROCEDURE — 99607 MTMS BY PHARM ADDL 15 MIN: CPT | Performed by: PHARMACIST

## 2018-02-20 RX ORDER — FLUOCINOLONE ACETONIDE 0.11 MG/ML
OIL TOPICAL
Qty: 2 BOTTLE | Refills: 11 | Status: SHIPPED | OUTPATIENT
Start: 2018-02-20 | End: 2018-05-22

## 2018-02-20 RX ORDER — LATANOPROST 50 UG/ML
SOLUTION/ DROPS OPHTHALMIC
Qty: 2.5 ML | Refills: 1 | COMMUNITY
Start: 2018-02-20 | End: 2018-05-22

## 2018-02-20 RX ORDER — IBUPROFEN 200 MG
200 TABLET ORAL EVERY 4 HOURS PRN
Qty: 100 TABLET | COMMUNITY
Start: 2018-02-20 | End: 2018-02-20

## 2018-02-20 RX ORDER — KETOCONAZOLE 20 MG/G
CREAM TOPICAL DAILY
Qty: 60 G | Refills: 11 | Status: SHIPPED | OUTPATIENT
Start: 2018-02-20 | End: 2018-05-22

## 2018-02-20 RX ORDER — DORZOLAMIDE HYDROCHLORIDE AND TIMOLOL MALEATE 20; 5 MG/ML; MG/ML
1 SOLUTION/ DROPS OPHTHALMIC DAILY
Qty: 10 ML | Refills: 1 | COMMUNITY
Start: 2018-02-20 | End: 2018-02-20

## 2018-02-20 RX ORDER — CARBIDOPA AND LEVODOPA 50; 200 MG/1; MG/1
TABLET, EXTENDED RELEASE ORAL
Qty: 90 TABLET | Refills: 3 | Status: SHIPPED | OUTPATIENT
Start: 2018-02-20 | End: 2018-03-23

## 2018-02-20 RX ORDER — KETOCONAZOLE 20 MG/ML
SHAMPOO TOPICAL
Qty: 120 ML | Refills: 11 | Status: SHIPPED | OUTPATIENT
Start: 2018-02-20 | End: 2018-05-22

## 2018-02-20 RX ORDER — TIMOLOL MALEATE 5 MG/ML
1 SOLUTION/ DROPS OPHTHALMIC DAILY
COMMUNITY
End: 2018-05-22

## 2018-02-20 RX ORDER — ROPINIROLE 4 MG/1
TABLET, FILM COATED, EXTENDED RELEASE ORAL
Qty: 90 TABLET | Refills: 3 | Status: SHIPPED | OUTPATIENT
Start: 2018-02-20 | End: 2018-05-22

## 2018-02-20 RX ORDER — CARBIDOPA AND LEVODOPA 25; 100 MG/1; MG/1
TABLET ORAL
Qty: 720 TABLET | Refills: 3 | Status: SHIPPED | OUTPATIENT
Start: 2018-02-20 | End: 2018-03-23

## 2018-02-20 RX ORDER — CARBIDOPA AND LEVODOPA 25; 100 MG/1; MG/1
TABLET ORAL
Qty: 630 TABLET | Refills: 3 | Status: SHIPPED | OUTPATIENT
Start: 2018-02-20 | End: 2018-02-20

## 2018-02-20 ASSESSMENT — PAIN SCALES - GENERAL: PAINLEVEL: NO PAIN (0)

## 2018-02-20 NOTE — MR AVS SNAPSHOT
After Visit Summary   2/20/2018    Eamon Whalen    MRN: [id number]           Patient Information     Date Of Birth          1955        Visit Information        Provider Department      2/20/2018 1:00 PM Taye De Guzman MD [facility] EMG        Today's Diagnoses     Abnormal nerve conduction studies    -  1    Numbness and tingling of both legs        Lumbosacral radiculopathy at L4           Follow-ups after your visit        Your next 10 appointments already scheduled     Feb 20, 2018  2:40 PM CST   (Arrive by 2:25 PM)   Return Movement Disorder with Edison Villasenor MD   [facility] Neurology ([facility] [place])    [address]  [address] [facility] [address] [phone]              Future tests that were ordered for you today     Open Future Orders        Priority Expected Expires Ordered    Needle EMG Each Extremity w/Paraspinal Area Limited (20394) Routine  2/20/2019 2/20/2018            Who to contact     Please call your clinic at [phone] to:    Ask questions about your health    Make or cancel appointments    Discuss your medicines    Learn about your test results    Speak to your doctor            Additional Information About Your Visit        Social [employer] Information     [employer] gives you secure access to your electronic health record. If you see a primary care provider, you can also send messages to your care team and make appointments. If you have questions, please call your primary care clinic.  If you do not have a primary care provider, please call [phone] and they will assist you.      [facility] [employer] [facility] is an electronic gateway that provides easy, online access to your medical records. With [facility] [employer] [facility], you can request a clinic appointment, read your test results, renew a prescription or communicate with your care team.     To access your existing account, please contact your [facility] or call [phone] for  assistance.        Care EveryWhere ID     This is your Care EveryWhere ID. This could be used by other organizations to access your [place] medical records  [id number]         Blood Pressure from Last 3 Encounters:   11/21/17 141/78   03/23/17 144/83   08/26/16 (!) 163/96    Weight from Last 3 Encounters:   11/21/17 65.8 kg (145 lb)   03/23/17 70.4 kg (155 lb 4.8 oz)   08/26/16 70.3 kg (155 lb)              We Performed the Following     EMG     HC NCS MOTOR W OR W/O F-WAVE, 7 OR 8        Primary Care Provider Office Phone # Fax #    Bernardino John [phone]       [facility] [address]  [facility] [address]        Equal Access to Services     [facility]: Hadii aad ku hadasho Soomaali, waaxda luqadaha, qaybta kaalmada adeegyada, waxay armandoin hayalfa lewis . So [facility] [phone].    ATENCIÓN: Si habla español, tiene a olmstead disposición servicios gratuitos de asistencia lingüística. Delbert al [phone].    We comply with applicable federal civil rights laws and [place] laws. We do not discriminate on the basis of race, color, national origin, age, disability, sex, sexual orientation, or gender identity.            Thank you!     Thank you for choosing [facility]  for your care. Our goal is always to provide you with excellent care. Hearing back from our patients is one way we can continue to improve our services. Please take a few minutes to complete the written survey that you may receive in the mail after your visit with us. Thank you!             Your Updated Medication List - Protect others around you: Learn how to safely use, store and throw away your medicines at www.disposemymeds.org.          This list is accurate as of 2/20/18  2:10 PM.  Always use your most recent med list.                   Brand Name Dispense Instructions for use Diagnosis    acetaminophen 325 MG tablet    TYLENOL    100 tablet    Take 2 tablets (650 mg) by mouth every 6 hours as  needed for mild pain        Albuterol Sulfate 108 (90 BASE) MCG/ACT Aepb      Inhale 2 puffs into the lungs 4 times daily as needed        aspirin 325 MG tablet     180 tablet    Take 1 tablet (325 mg) by mouth daily        * carbidopa-levodopa  MG per tablet    SINEMET    630 tablet    2 tab at 7am, 11 am & 4 pm; 1 tablet at 8-9pm.    == 7/day    Paralysis agitans (H)       * carbidopa-levodopa  MG per CR tablet    SINEMET CR    90 tablet    1 tab @ 9pm    Paralysis agitans (H)       * ketoconazole 2 % shampoo    NIZORAL    120 mL    Apply to the affected area and wash off after 5 minutes.    Paralysis agitans (H), Seborrhea       * ketoconazole 2 % cream    NIZORAL    60 g    Apply topically daily Apply topical daily    Seborrhea, Paralysis agitans (H)       NONFORMULARY      avastin injection of the right eye        NUPLAZID 17 MG Tabs tablet   Generic drug:  pimavanserin     180 tablet    Take 2 tablets (34 mg) by mouth daily    Paralysis agitans (H), Psychosis, unspecified psychosis type       Ropinirole HCl 4 MG Tb24    REQUIP XL    90 tablet    1 tab at 8-9pm    Parkinson's disease (H), Restless leg syndrome       SEROQUEL 25 MG tablet   Generic drug:  QUEtiapine     30 tablet    Take 1 tablet (25 mg) by mouth nightly as needed    Psychosis, unspecified psychosis type, Parkinson's disease (H), Paralysis agitans (H)       XALATAN 0.005 % ophthalmic solution   Generic drug:  latanoprost     2.5 mL    One drop in the right eye in the morning and evening        ZANTAC 150 MG tablet   Generic drug:  ranitidine     180 tablet    Take 1 tablet (150 mg) by mouth 2 times daily    Paralysis agitans (H), Esophageal reflux       * Notice:  This list has 4 medication(s) that are the same as other medications prescribed for you. Read the directions carefully, and ask your doctor or other care provider to review them with you.

## 2018-02-20 NOTE — MR AVS SNAPSHOT
After Visit Summary   2/20/2018    Eamon Whalen    MRN: [id number]           Patient Information     Date Of Birth          1955        Visit Information        Provider Department      2/20/2018 3:30 PM Sheree Neumann [facility] Neurology [facility] [place]        Care Instructions    Recommendations from today's MTM visit:                                                    MTM (medication therapy management) is a service provided by a clinical pharmacist designed to help you get the most of out of your medicines.     1. I'm going to check on the insurance coverage of [employer].    2. Call me with any questions about the new medications.      Next MTM visit: TBD    To schedule another MTM appointment, please call the clinic directly or you may call the MTM scheduling line at [phone] or toll-free at [phone].     My Clinical Pharmacist's contact information:                                                      It was a pleasure seeing you today!  Please feel free to contact me with any questions or concerns you have.      Sheree Neumann, Pharm.D.  Medication Therapy Management Resident  Phone: [phone]  Pager: [phone]    You may receive a survey about the MTM services you received.  I would appreciate your feedback to help me serve you better in the future. Please fill it out and return it when you can. Your comments will be anonymous.            Follow-ups after your visit        Your next 10 appointments already scheduled     Feb 20, 2018  3:30 PM CST   (Arrive by 3:15 PM)   Office Visit with Sheree Neumann RPH   [facility]rology [facility] [place] ([facility] [place])    [address] [address] [phone]           Bring a current list of meds and any records pertaining to this visit. For Physicals, please bring immunization records and any forms needing to be filled out. Please arrive 10 minutes early to complete paperwork.               Future tests that were ordered for you today     Open Future Orders        Priority Expected Expires Ordered    Needle EMG Each Extremity w/Paraspinal Area Limited (91331) Routine  2/20/2019 2/20/2018            Who to contact     If you have questions or need follow up information about today's clinic visit or your schedule please contact [facility] directly at [phone].  Normal or non-critical lab and imaging results will be communicated to you by [employer], letter or phone within 4 business days after the clinic has received the results. If you do not hear from us within 7 days, please contact the clinic through [employer] or phone. If you have a critical or abnormal lab result, we will notify you by phone as soon as possible.  Submit refill requests through [employer] or call your pharmacy and they will forward the refill request to us. Please allow 3 business days for your refill to be completed.          Additional Information About Your Visit        [employer] Information     [employer] gives you secure access to your electronic health record. If you see a primary care provider, you can also send messages to your care team and make appointments. If you have questions, please call your primary care clinic.  If you do not have a primary care provider, please call [phone] and they will assist you.        Care EveryWhere ID     This is your Care EveryWhere ID. This could be used by other organizations to access your [place] medical records  [id number]         Blood Pressure from Last 3 Encounters:   02/20/18 (!) 168/100   11/21/17 141/78   03/23/17 144/83    Weight from Last 3 Encounters:   02/20/18 152 lb 11.2 oz (69.3 kg)   11/21/17 145 lb (65.8 kg)   03/23/17 155 lb 4.8 oz (70.4 kg)              Today, you had the following     No orders found for display         Today's Medication Changes          These changes are accurate as of 2/20/18  3:06 PM.  If you have any questions, ask  your nurse or doctor.               Start taking these medicines.        Dose/Directions    fluocinolone acetonide 0.01 % oil   Commonly known as:  DERMA-SMOOTHE/FS BODY   Used for:  Seborrhea, Paralysis agitans (H)   Started by:  Edison Villasenor MD        Wet hair and apply to scalp and leave in for 4 hours and then wash off. May use 1 times per week   Quantity:  2 Bottle   Refills:  11         These medicines have changed or have updated prescriptions.        Dose/Directions    * carbidopa-levodopa  MG per CR tablet   Commonly known as:  SINEMET CR   This may have changed:  additional instructions   Used for:  Paralysis agitans (H)   Changed by:  Edison Villasenor MD        1 tab by mouth @ 9pm   Quantity:  90 tablet   Refills:  3       * carbidopa-levodopa  MG per tablet   Commonly known as:  SINEMET   This may have changed:  You were already taking a medication with the same name, and this prescription was added. Make sure you understand how and when to take each.   Used for:  Paralysis agitans (H)   Changed by:  Edison Villasenor MD        By mouth: 2 tab at 7am, 11 am & 4 pm; 1 tab at 8-9pm and 1 tab @2-3am.    == 8/day   Quantity:  720 tablet   Refills:  3       Ropinirole HCl 4 MG Tb24   Commonly known as:  REQUIP XL   This may have changed:  additional instructions   Used for:  Parkinson's disease (H), Restless leg syndrome   Changed by:  Edison Villasenor MD        1 tab by mouth at 8-9pm   Quantity:  90 tablet   Refills:  3       XALATAN 0.005 % ophthalmic solution   This may have changed:  additional instructions   Generic drug:  latanoprost   Changed by:  Edison Villasenor MD        One drop in both eyes in the evening   Quantity:  2.5 mL   Refills:  1       * Notice:  This list has 2 medication(s) that are the same as other medications prescribed for you. Read the directions carefully, and ask your doctor or other care provider to review them with you.      Stop taking these  medicines if you haven't already. Please contact your care team if you have questions.     acetaminophen 325 MG tablet   Commonly known as:  TYLENOL   Stopped by:  Edison Villasenor MD           NONFORMULARY   Stopped by:  Edison Villasenor MD           SEROQUEL 25 MG tablet   Generic drug:  QUEtiapine   Stopped by:  Edison Villasenor MD                Where to get your medicines      These medications were sent to [id number] [place] - [address]  [address] [facility]) [place] [address]     Phone:  [phone]     carbidopa-levodopa  MG per tablet    carbidopa-levodopa  MG per CR tablet    fluocinolone acetonide 0.01 % oil    ketoconazole 2 % cream    ketoconazole 2 % shampoo    Ropinirole HCl 4 MG Tb24                Primary Care Provider Office Phone # Fax #    Bernardino John [phone]       [facility] [address]  [facility] [address]        Equal Access to Services     [facility]: Hadii aad ku hadasho Soomaali, waaxda luqadaha, qaybta kaalmada adeegyada, waxay idiin hayaan adeeg kharash la'devn . So [facility] [phone].    ATENCIÓN: Si habla español, tiene a olmstead disposición servicios gratuitos de asistencia lingüística. [facility] [phone].    We comply with applicable federal civil rights laws and [place] laws. We do not discriminate on the basis of race, color, national origin, age, disability, sex, sexual orientation, or gender identity.            Thank you!     Thank you for choosing [facility]  for your care. Our goal is always to provide you with excellent care. Hearing back from our patients is one way we can continue to improve our services. Please take a few minutes to complete the written survey that you may receive in the mail after your visit with us. Thank you!             Your Updated Medication List - Protect others around you: Learn how to safely use, store and throw  away your medicines at www.disposemymeds.org.          This list is accurate as of 2/20/18  3:06 PM.  Always use your most recent med list.                   Brand Name Dispense Instructions for use Diagnosis    aspirin 325 MG tablet     180 tablet    Take 1 tablet (325 mg) by mouth daily        * carbidopa-levodopa  MG per CR tablet    SINEMET CR    90 tablet    1 tab by mouth @ 9pm    Paralysis agitans (H)       * carbidopa-levodopa  MG per tablet    SINEMET    720 tablet    By mouth: 2 tab at 7am, 11 am & 4 pm; 1 tab at 8-9pm and 1 tab @2-3am.    == 8/day    Paralysis agitans (H)       COSOPT 2-0.5 % ophthalmic solution   Generic drug:  dorzolamide-timolol     10 mL    Place 1 drop into both eyes daily        fluocinolone acetonide 0.01 % oil    DERMA-SMOOTHE/FS BODY    2 Bottle    Wet hair and apply to scalp and leave in for 4 hours and then wash off. May use 1 times per week    Seborrhea, Paralysis agitans (H)       * ketoconazole 2 % shampoo    NIZORAL    120 mL    Apply to the affected area and wash off after 5 minutes.    Paralysis agitans (H), Seborrhea       * ketoconazole 2 % cream    NIZORAL    60 g    Apply topically daily Apply topical daily    Seborrhea, Paralysis agitans (H)       Ropinirole HCl 4 MG Tb24    REQUIP XL    90 tablet    1 tab by mouth at 8-9pm    Parkinson's disease (H), Restless leg syndrome       XALATAN 0.005 % ophthalmic solution   Generic drug:  latanoprost     2.5 mL    One drop in both eyes in the evening        * Notice:  This list has 4 medication(s) that are the same as other medications prescribed for you. Read the directions carefully, and ask your doctor or other care provider to review them with you.

## 2018-02-20 NOTE — LETTER
2018      RE: Eamon Whalen  [id number]  [place] [address]       Diagnosis/Summary/Recommendations:    PATIENT: Eamon Whalen  62 year old male     : 1955    MERCEDES: 2018    Parkinson  He has shuffling and freezing of gait - primarily on initiating his gait.     emg study results are probably artifactual as he does not appear to have clinical features that support the electrical findings.     Biopsy of his prostate pending tomorrow    He is urinating during the night.   Last night he slept through the night.   He wants to get a rocker and can sleep with th is.   His mattress - he is trying to return as he has nocturnal akinesia that is affecting his mobility    He goes to bed at 1030pm  Wakes up at 2-3am to  Urinate and has problems going back to sleep   He shuffles during the night.   He was unable to get comfortable and may not be able to fall back asleep  Before he was waking up at 630/7am  Now he is waking up at 530am.   If he is uncomfortable he gets up and gets out the bed and falls asleep in a chair in the kitchen if he can fall asleep but is not rested with this position or sleeps on a  but it is too small to sleep on.     His 7am dose - not necessary having clear off or on periods but when asked he has periods where he is shuffling and times when he is moving well but cannot designate the times.     He has not been on rytary         Medications     7am 11am 4pm 8-9pm 1030pm 2-3am   Acetaminophen tylenol 325mg Not taking        Albuterol sulfate inhaler Not taking        Aspirin 325mg Not this week        Carbidopa/levodopa 50/200 CR Sinemet    1     Carbidopa/levodopa 25/100 Sinemet 2 2 2 1 Goes to bed 1   Dorzolamide timolol   Yellow cap ?correct medication?  1 drop both eyes        Ibuprofen 200mg As needed        Ketoconazole cream         Ketoconazole shampoo         Latanoprost xalatan ophthalmic solution  ?correct?    1 drop both eyes evening     nonformulary  avastin Stopped for now        Pimavanserin nuplazid 17mg Not taking        Quetiapine seroquel 25mg Not taking        Ranitidine zantac 150mg Not taking         Ropinirole Requip XL 4mg    1       History obtained from patient     PLAN  Seborrhea -   ketoconazole shampoo   ketoconazole cream   Derma smoothe use once per week    Getting a prostate biopsy by Dr. Goldberg tomorrow as his psa was high. He had a mri  He has had a psa of 10. He is off aspirin.    He has fatigue  Not sure if he has had cbc, sed rate, crp, thyroid, paraneoplastic panel, metabolic screen, vitamin b12/mma and folate as we dont have access to records. May want to review with his pcp    His emg is abnormal but this is probably artifactual.     He has a new mattress  Suspect that he has  Nocturnal akinesia and that with the night time dose of sinemet this may help.   He will add a night time sinemet to see if helps his mobility through the night and when he wakes up in the morning.     Remains off the antipsychotics    He came by blue ride - transportation issue    Consider a visit with Sheree Neumann, Pharm D to discuss medication options besides sinemet above and down the road consider rytary to replace his sinemet regimen if needed.     Return visit in 6 months.     Coding statement:   Duration of  Services: patient care and care coordination was 25 minutes  Greater than 50% of this visit was spent in counseling and coordination of care.     Edison Villasenor MD     ______________________________________    Last visit date and details:     [facility]  Electrodiagnostic Laboratory    Nerve Conduction & EMG Report    Patient:       Eaomn Whalen  Patient ID:    [id number]  Gender:        Male  YOB: 1955  Age:           62 Years 2 Months      History & Examination:    62 year old man with history of Parkinson's disease, restless leg syndrome, and paresthesias in feet and legs. Query polyneuropathy. His clinical examination shows  vibration of about 10 seconds at the toes and medial malleoli (both normal), normal light touch and position sensation at the toes, without right-vs-left side differences.    Techniques: Motor conduction studies were done with surface recording electrodes. Sensory conduction studies were performed with surface electrodes, unless indicated otherwise by (n), designating the use of subdermal recording electrodes. Temperature was monitored and recorded throughout the study. Upper extremities were maintained at a temperature of 32 degrees Centigrade or higher.  Lower extremities were maintained at a temperature of 31degrees Centigrade or higher. EMG was done with a concentric needle electrode.     Results:    Right sural antidromic sensory NCS was normal. Left sural SNAP was absent; the study was attempted with surface and needle subdermal recording electrodes, multiple times. Left superficial peroneal SNAP was absent. Right superficial peroneal sensory NCS showed attenuated SNAP amplitude and normal conduction velocity. Bilateral deep peroneal and the right tibial motor NCSs were normal. Right tibial F-wave latencies were normal. EMG of bilateral tibialis anterior, left medial gastrocnemius, and left vastus medialis was normal. EMG of right medial gastrocnemius showed increased insertional activity and 2+ fasciculations, but no sustained fibrillations/positive waves, and MUP morphology and recruitment patterns were normal. EMG of right vastus lateralis showed no abnormal spontaneous activity, and some large, long duration, stable MUPs with normal recruitment patterns.     Interpretation:    1. Abnormalities of sensory nerve conduction studies of uncertain significance. Possibilities include technical problems, a left sural sensory mononeuropathy, or an asymmetric sensory polyneuropathy/mononeuropathy multiplex. Please see comment.   2. Mild chronic neurogenic changes at the right vastus lateralis. Differential diagnosis  includes right chronic L4 radiculopathy vs femoral neuropathy.     Comment: The fact that the clinical sensory examination at the ankles and toes is symmetric makes it less likely that the asymmetric findings of the sensory nerve conduction studies are clinically significant or relevant. No obvious technical problem was identified during the stimulation. Please note that superficial peroneal sensory studies in individuals over age 60 are often challenging and a number of healthy adults above that age may show absent responses. A diagnosis of polyneuropathy is not possible without clear clinical correlation in this case.     EMG Physician:    MD Joel Isabel Elizabeth J, O.T. - 01/30/2018 10:30 AM CST  Formatting of this note may be different from the original.  Consults     [facility]  Occupational Therapy Outpatient 's Evaluation     SUBJECTIVE     Referring Provider: Bernardino John M.D.  Rehab Diagnosis:   1. Parkinson Disease (HCC)   2.  Exam     Onset Date: 03/27/17  Payor: [facility] CARE / Plan: [facility] [employer] O / Product Type: Medicaid HMO /     PERTINENT MEDICAL / SURGICAL HISTORY:  Patient Active Problem List   Diagnosis     Parkinson Disease (HCC)      Exam     No past surgical history on file.    Subjective     Pt is a 62 y.o. year old male referred for assessment of his component skills as they relate to driving safety. Pt was referred due to Parkinson Disease and revocation of license. [place]. he underwent this same evaluation in May 2017, in which he was not recommended to drive, due to severe impairments found on all components of the assessment. Pt attended this evaluation alone, reporting that a brother in law dropped him off. Pt reports that no matter what the results, he needs to return to driving. 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 Patient does wear glasses. Pt does not feel he has any concerns for driving and has been very upset at the difficulty of this process. Pt previous level of function is IND with ADLs and IADLs. Per last driving evaluation, pt has had his license revoked previously and had been charged 5 times with driving after revocation.     Objective  Pt was assessed for component physical, cognitive, and visual skills. Results as follows:     Physical: Pt has functional ROM and strength demonstrated in neck, trunk, and upper and lower extremities. He does present with significant kyphosis.     Cognitive: Pt was given the [place] Driving Battery, a specialized driving assessment that addresses core neuropsychological skills necessary for safe driving and can be used as a tool to aid in decision-making about a person s ability to drive. Pt. participated in this assessment on this date. Results as follows:  Profile Score Pass/Fail  Perceptual Analysis  Incomplete Letters 2 F  Position Discrimination 0 P  Cube Analysis 2 F  Attention  Es and Fs - Visual Attention 2 F  Es and Fs ( threes ) - Divided Attention 2 F  Praxis Skills  Copying, Gesture and Objects 0 P  Tapping and Sequencing 0 P  Executive Functioning  Key Search Test 2 P  Action Program Test 2 F  Rule Shift Cards Test 2 F  Sorting Test 0 P  Comprehension  Comprehension Test 0 P    Overall battery score: 14  Total number of tests failed: 6  Scores between 6 and 10 are considered borderline and indicate risk for driving. Pts. results on this assessment indicate patient is unsafe to drive, and would be associated with a greater than 90% chance of failing an on the road assessment.    Reaction time was measured using the "[employer] 2000. Pt scored a 38 on Mode A, which demonstrates a delay in reaction time. Pt scored 22 on Mode B/Divided Attention while correctly identifying 2/10 of the additional stimuli. This  would indicate unsafe range in divided attention.    Assessment   Recommendations: Pt presents with functional strength and range of motion to operate a motor vehicle. Pt s visual reaction time demonstrated as unsafe for highway speeds as did his pedal to brake reaction time. Divided attention tested as impaired. Cognitive testing indicates that there are significant cognitive concerns related to driving. Based on the testing performed today, pt presents with decreased reaction time, impaired divided attention and impaired cognition, that would indicate decreased safety in relation to driving. Recommend that pt no longer drive and follow up with his physician. No further OT is indicated at this time in this setting. Pt was instructed in findings and recommendations. Pt was instructed in safety concerns for driving demonstrated today. Pt was able to verbalize understanding.     Goals  Pt and/or family will verbalize understanding of findings and recommendations after 1 visit.    Plan     Treatment Interventions: Self-care/home management  OT Duration: 1x eval   Plan: Discontinue therapy    Time Spent with Patient  OT Evaluation (min): 30 min        Home Management Training (min): 30 min     Total Timed Units (min): 30 min  Total Treatment Time (min): 60 min    Provider signature: _________________________________ Date:_______________  Print name: _______________________________________          PATIENT: Eamon Whalen  61 year old male      : 1955     MERCEDES: 2017     Acetaminophen  Albuterol  Aspirin  Sinemet carbidopa/levodopa CR 50/200 at bedtime   Sinemet carbidopa/levodopa 25/100 2-2-2-1  nizoral shampoo  nizoral cream  Latanoprost (xalatn) 0.005%  avastin injecdtion  pimavanserin (nuplazid) 17mg x 2 - never started   Quetiapine (seroquel) 25mg at night - never started.   Rantidine (zantac) 150mg 2/day  Ropinirole XL 4mg at 8-9pm  Trihexyphenidyl 2mg 7am 11-12 4pm 8-9pm      Present  regimen      Meds                   7a 11a 4p 8-9p 9p        50/200 sinemet           1           25/100  sinemet 2 2  2  1               requip XL 4mg ropinirole         1           trihexy 2mg artane 1 1 1 1           seroquel 25mg       Not taking         pimavanserin 17mg       Not taking                                                Over 50% of this visit was spent in patient care and care coordination.      History obtained from patient     Total visit time was 25 minutes     PLAN  Reduce the artane (trihexyphenidyl) from 1 tablet 4 times per day every 3 days by one tablet and go off this medication eventually. So reduce by one tablet of artane every 3 days. So go from   1-1-1-1  (now) to   1-1-1 (24th November) to   1-1 (27 November) to   1 (30 November) to   0 (2nd December)     Meds                   7a 11a 4p 8-9p 9p        50/200 sinemet           1           25/100  sinemet 2 2  2  1               requip XL 4mg ropinirole         1           trihexy 2mg artane 1->0 1->0 1->0 1->0           seroquel 25mg quetiapine       Not taking         pimavanserin 17mg  nuplazid       Not taking         Rivastigmine patch  exelon patch       Not taking                              Hold off on starting seroquel (quetiapine) for now.   Hold off on starting pimavanserin (nuplazid) for now   Call us back in early December to report how you are doing off the artane.  If you have more tremor or problems while your artane dose is being reduced call us or send us a [employer] message.   Return back in 1-3 months.   Keep the sinemet 25/100 and sinemet 50/200 dose the same  Keep the ropinirole/requip XL dose the same.   In future will discuss rivastigmine (exelon) patch for memory issues.      Needs cardiology consult for bifascicular block.     Edison Villasenor MD            ______________________________________      Patient was asked about 14 Review of systems including changes in vision (dry eyes, double vision), hearing, heart,  lungs, musculoskeletal, depression, anxiety, snoring, RBD, insomnia, urinary frequency, urinary urgency, constipation, swallowing problems, hematological, ID, allergies, skin problems: seborrhea, endocrinological: thyroid, diabetes, cholesterol; balance, weight changes, and other neurological problems and these were not significant at this time except for   Patient Active Problem List   Diagnosis     Paralysis agitans (H)     Wears glasses     Balance problems     Constipation     GERD (gastroesophageal reflux disease)     Urinary urgency     Insomnia     Seborrhea     Lumbago     Hearing loss     Family history of Parkinson's disease     Central retinal vein occlusion     Choroidal nevus     Vision problem -- Right Eye     ED (erectile dysfunction)     Glaucoma     Abnormal electrocardiogram     Bifascicular block     Prolonged Q-T interval on ECG          Allergies   Allergen Reactions     Cats      Dogs      Past Surgical History:   Procedure Laterality Date     NO HISTORY OF SURGERY       Past Medical History:   Diagnosis Date     Abnormal electrocardiogram 3/23/2017     Balance problems      Bifascicular block 3/23/2017     Central retinal vein occlusion 4/11/2014     Choroidal nevus 4/11/2014     Constipation      Family history of Parkinson's disease      GERD (gastroesophageal reflux disease)      Glaucoma 6/4/2015     Hearing loss      Lumbago      Parkinson's disease (H)      Prolonged Q-T interval on ECG 3/23/2017     Urinary urgency      Wears glasses      Social History     Social History     Marital status: Single     Spouse name: N/A     Number of children: N/A     Years of education: N/A     Occupational History     farm Self Employed.     Social History Main Topics     Smoking status: Never Smoker     Smokeless tobacco: Never Used      Comment: cigar sometimes     Alcohol use Yes      Comment: occ     Drug use: No     Sexual activity: Yes     Partners: Female     Other Topics Concern     Not on file  "    Social History Narrative    1 daughter who is  and lives in [place]    She was pregnant with the 1st child and had some problems in the 6-7 month of pregnancy with     Baby that may have hydrocephalus as there was IUGR. The baby  after 3 months in 2011. She was encouraged her to undergo genetic testing which was negative and they are considering having more children.     He met a woman and may  a woman from [place] - who has [place] roots. 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 He met her 3 times. Her name is Tavia and lives in [place]. They will be visiting within a day or so with her mother. She is 36 yrs old. She is not going back there.     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 Depending on the status of the main house may build new house.         [place] in his home town had parkinson    [place] lived [place] side of [facility] -- [place].         This was way before pesticides        Maternal grandfather     Dax Sorenson - [place] name     He would have to be brought out to a rocking chair    \"hardening of there arteries.         mother       Drug and lactation database from the [place] National Library of Medicine:  http://toxnet.nlm.nih.gov/cgi-bin/sis/htmlgen?LACT      B/P: Data Unavailable, T: Data Unavailable, P: Data Unavailable, R: Data Unavailable 0 lbs 0 oz  There were no vitals taken for this visit., There is no height or weight on file to calculate BMI.  Medications and Vitals not listed above were documented in the cart and reviewed by me.     Current Outpatient Prescriptions   Medication Sig Dispense Refill     carbidopa-levodopa (SINEMET)  MG per tablet 2 tab at 7am, 11 am & 4 pm; 1 tablet at 8-9pm.    == 7/day 630 tablet 3     carbidopa-levodopa (SINEMET CR)  MG per CR tablet 1 tab @ 9pm 90 " tablet 3     Ropinirole HCl (REQUIP XL) 4 MG TB24 1 tab at 8-9pm 90 tablet 3     pimavanserin (NUPLAZID) 17 MG TABS tablet Take 2 tablets (34 mg) by mouth daily 180 tablet 3     QUEtiapine (SEROQUEL) 25 MG tablet Take 1 tablet (25 mg) by mouth nightly as needed 30 tablet 11     latanoprost (XALATAN) 0.005 % ophthalmic solution One drop in the right eye in the morning and evening 2.5 mL 1     acetaminophen (TYLENOL) 325 MG tablet Take 2 tablets (650 mg) by mouth every 6 hours as needed for mild pain 100 tablet      Albuterol Sulfate 108 (90 BASE) MCG/ACT AEPB Inhale 2 puffs into the lungs 4 times daily as needed       ranitidine (ZANTAC) 150 MG tablet Take 1 tablet (150 mg) by mouth 2 times daily 180 tablet 3     aspirin 325 MG tablet Take 1 tablet (325 mg) by mouth daily 180 tablet      ketoconazole (NIZORAL) 2 % shampoo Apply to the affected area and wash off after 5 minutes. 120 mL 11     ketoconazole (NIZORAL) 2 % cream Apply topically daily Apply topical daily 60 g 11     NONFORMULARY avastin injection of the right eye           Edison Villasenor MD

## 2018-02-20 NOTE — PROGRESS NOTES
SUBJECTIVE/OBJECTIVE:                           Eamon Whalen is a 62 year old male coming in for an initial visit for Medication Therapy Management.  He was referred to me from Dr. Villasenor.     Chief Complaint: Initial MTM visit    Allergies/ADRs: Reviewed in Epic  Tobacco: cigar occasionally  Alcohol: 1-3 beverages / week  Caffeine: 1 cups/day of coffee  Activity: minimal, concerned about walking outside d/t the ice; would like to get a bike  PMH: Reviewed in Epic    Medication Adherence/Access  The patient misses their medication 0 times per week.   Patient is responsible for his/her own medications.   Adherence/Compliance is described as excellent.  Medication barriers: no issues reported by patient.    Of note, patient is having a prostate biopsy tomorrow, so he is currently holding his daily aspirin.    Parkinson's Disease:  Current medications include: Levodopa-carbidopa 100-25 mg IR 2 tablets three times daily (7 am, 11 am, and 4 pm) and he takes one IR tablet at 8-9 pm. He also takes one ER levodopa-carbidopa 200-50 mg tablet at 8-9 pm. Patient is also taking ropinirole XL 4 mg at bedtime (8-9 pm). Pt reports that symptoms of PD are worsened. Current PD symptoms include: Shuffling, Freezing, Rigidity and Akinesia/bradykinesia. Patient is having nocturnal akinesia with a new mattress he purchased and always wakes up at 2-3 am but has difficulty going back to sleep. Nuplazid and quetiapine have been prescribed in the past but he has never taken them and denies hallucinations.    Glaucoma: Current medications include timolol (strength unknown) one drop in each eye in the morning and latanoprost 0.005% one drop in each eye in the evening. Patient reports that he has been told his eye pressure has improved since being on these drops.     Seborrhea: Patient is not currently taking any medications but has used a tar shampoo in the past and a Xuan topical product in the past.       Current labs include:  BP  Readings from Last 3 Encounters:   02/20/18 (!) 168/100   11/21/17 141/78   03/23/17 144/83       Most Recent Immunizations   Administered Date(s) Administered     FLU 6-35 months 01/13/2011     Meningococcal (Menomune ) 10/01/2014     Twinrix A/B 10/01/2014     Typhoid IM 10/01/2014     Yellow Fever 01/13/2011       ASSESSMENT:                             Current medications were reviewed today.     Medication Adherence: no issues identified    Parkinson's Disease: Needs improvement. As discussed with Dr. Villasenor, patient may benefit form either an overnight dose of carbidopa-levodopa or a longer-acting carbdiopa-levodopa that would last overnight (Rytary 36 mg 3 caps TID). Could also consider a CR dosage of carbidopa-levodopa if Rytary is not covered by his insurance.    Glaucoma: Appears stable.    Seborrhea: Needs improvement. Dr. Villasenor ordered topical products today for patient to try.    Elevated BP: Needs improvement. Unable to obtain outside records with current BP measurements. Recommend patient follow up with PCP and if BP elevated at next neurology clinic visit would recommend initiation of antihypertensive medication.    PLAN:                            1. Patient instructed to start taking an overnight Sinemet dose around 2-3 am.   2. Patient given rx's for ketoconazole shampoo and cream and MTM pharmacist provided some education on these products.  3. MTM pharmacist to check insurance coverage of Rytary: Rytary is not on the [employer] [place] formul[place]. Consider pursing PA/appeal if the overnight Sinemet dose is not effective for him.    I spent 30 minutes with this patient today. I offer these suggestions for consideration by the movement disorders specialist. A copy of the visit note was provided to the movement disorders specialist.    Will follow up in 3 months (co-visit with Dr. Villasenor): 5/22/18    I concur with the note as dictated above which reflects our joint assessment and plan.   Hannah Melo,  PharmD    The patient was given a summary of these recommendations as an after visit summary.     Kvng FullerD.  Medication Therapy Management Resident  Phone: [phone]  Pager: [phone]

## 2018-02-20 NOTE — PATIENT INSTRUCTIONS
Recommendations from today's MTM visit:                                                    MTM (medication therapy management) is a service provided by a clinical pharmacist designed to help you get the most of out of your medicines.     1. I'm going to check on the insurance coverage of Rytary.    2. Call me with any questions about the new medications.      Next MTM visit: TBD    To schedule another MTM appointment, please call the clinic directly or you may call the MTM scheduling line at [phone] or toll-free at [phone].     My Clinical Pharmacist's contact information:                                                      It was a pleasure seeing you today!  Please feel free to contact me with any questions or concerns you have.      Sheree Neumann, Pharm.D.  Medication Therapy Management Resident  Phone: [phone]  Pager: [phone]    You may receive a survey about the MTM services you received.  I would appreciate your feedback to help me serve you better in the future. Please fill it out and return it when you can. Your comments will be anonymous.

## 2018-02-20 NOTE — PROGRESS NOTES
[facility]  Electrodiagnostic Laboratory    Nerve Conduction & EMG Report          Patient:       Eamon Whalen  Patient ID:    [id number]  Gender:        Male  YOB: 1955  Age:           62 Years 2 Months      History & Examination:    62 year old man with history of Parkinson's disease, restless leg syndrome, and paresthesias in feet and legs. Query polyneuropathy. His clinical examination shows vibration of about 10 seconds at the toes and medial malleoli (both normal), normal light touch and position sensation at the toes, without right-vs-left side differences.    Techniques: Motor conduction studies were done with surface recording electrodes. Sensory conduction studies were performed with surface electrodes, unless indicated otherwise by (n), designating the use of subdermal recording electrodes. Temperature was monitored and recorded throughout the study. Upper extremities were maintained at a temperature of 32 degrees Centigrade or higher.  Lower extremities were maintained at a temperature of 31degrees Centigrade or higher. EMG was done with a concentric needle electrode.     Results:    Right sural antidromic sensory NCS was normal. Left sural SNAP was absent; the study was attempted with surface and needle subdermal recording electrodes, multiple times. Left superficial peroneal SNAP was absent. Right superficial peroneal sensory NCS showed attenuated SNAP amplitude and normal conduction velocity. Bilateral deep peroneal and the right tibial motor NCSs were normal. Right tibial F-wave latencies were normal. EMG of bilateral tibialis anterior, left medial gastrocnemius, and left vastus medialis was normal. EMG of right medial gastrocnemius showed increased insertional activity and 2+ fasciculations, but no sustained fibrillations/positive waves, and MUP morphology and recruitment patterns were normal. EMG of right vastus lateralis showed no abnormal spontaneous activity, and  some large, long duration, stable MUPs with normal recruitment patterns.     Interpretation:    1. Abnormalities of sensory nerve conduction studies of uncertain significance. Possibilities include technical problems, a left sural sensory mononeuropathy, or an asymmetric sensory polyneuropathy/mononeuropathy multiplex. Please see comment.   2. Mild chronic neurogenic changes at the right vastus lateralis. Differential diagnosis includes right chronic L4 radiculopathy vs femoral neuropathy.     Comment: The fact that the clinical sensory examination at the ankles and toes is symmetric makes it less likely that the asymmetric findings of the sensory nerve conduction studies are clinically significant or relevant. No obvious technical problem was identified during the stimulation. Please note that superficial peroneal sensory studies in individuals over age 60 are often challenging and a number of healthy adults above that age may show absent responses. A diagnosis of polyneuropathy is not possible without clear clinical correlation in this case.     EMG Physician:    Taye De Guzman MD       Sensory NCS      Nerve / Sites Rec. Site Onset Peak NP Amp Ref. PP Amp Dist Dennis Ref. Temp     ms ms  V  V  V cm m/s m/s  C   R SURAL - Lat Mall 60      Calf Ankle 2.60 3.59 6.1 5.0 5.2 14 53.8 38.0 31.9   L SURAL - Lat Mall 60      Calf Ankle    5.0  14  38.0 31.7      Calf Ankle (n) NR NR NR 5.0 NR 14 NR  31.6   R SUP PERONEAL      Lat Leg Bailey 2.81 3.44 2.8  1.1 12.5 44.4 38.0 32.2   L SUP PERONEAL      Lat Leg Bailey NR NR NR  NR 12.5 NR 38.0 32.1       Motor NCS      Nerve / Sites Rec. Site Lat Ref. Amp Ref. Rel Amp Dist Dennis Ref. Dur. Area Temp.     ms ms mV mV % cm m/s m/s ms %  C   R DEEP PERONEAL - EDB 60      Ankle EDB 3.49 6.00 3.8 2.0 100 8   5.99 100 31.9      FibHead EDB 10.83  3.8  99.4 33 44.9 38.0 6.61 104 32.1      Pop Fos EDB 12.50  3.7  96 8 48.0 38.0 6.72 97.4 32.2   L DEEP PERONEAL - EDB 60      Ankle EDB  4.01 6.00 4.6 2.0 100 8   5.99 100 31.7   R TIBIAL - AH      Ankle AH 3.13 6.00 14.1 4.0 100 8   4.53 100 32.4      Pop Fos AH 11.56  9.9  70.2 43 51.0 38.0 5.26 91.3 32.2       F  Wave      Nerve Min F Lat Max F Lat Mean FLat Temp.    ms ms ms  C   R TIBIAL 50.47 58.59 53.93 33       EMG Summary Table     Spontaneous MUAP Recruitment    IA Fib PSW Fasc H.F. Amp Dur. PPP Pattern   R. TIB ANTERIOR N None None None None N N N N   R. GASTROCN (MED) Increased None None 2+ (slow) None N N N N   R. VAST LATERALIS N None None None None 1+ 1+ N N   L. TIB ANTERIOR N None None None None N N N N   L. GASTROCN (MED) N None None None None N N N N   L. VAST MEDIALIS N None None None None N N N N

## 2018-02-20 NOTE — Clinical Note
2018       RE: Eamon Whalen  [id number]  [place] [address]     Dear Colleague,    Thank you for referring your patient, Eamon Whalen, to the [facility] NEUROLOGY at [facility]. Please see a copy of my visit note below.    Diagnosis/Summary/Recommendations:    PATIENT: Eamon Whalen  62 year old male     : 1955    MERCEDES: 2018    Parkinson      Medications     7am 11am 4pm 8-9pm    Acetaminophen tylenol 325mg        Albuterol sulfate inhaler        Aspirin 325mg        Carbidopa/levodopa 50/200 CR Sinemet    1    Carbidopa/levodopa 25/100 Sinemet 1 1 1 1    Ketoconazole cream        Ketoconazole shampoo        Latanoprost xalatan ophthalmic solution        nonformulary avastin        Pimavanserin nuplazid 17mg ?       Quetiapine seroquel 25mg ?       Ranitidine zantac 150mg        Ropinirole Requip XL 4mg    1      History obtained from patient      Coding statement:   Duration of  Services: patient care and care coordination was 25 minutes  Greater than 50% of this visit was spent in counseling and coordination of care.     Edison Villasenor MD     ______________________________________    Last visit date and details:     PATIENT: Eamon Whalen  61 year old male      : 1955     MERCEDES: 2017     Acetaminophen  Albuterol  Aspirin  Sinemet carbidopa/levodopa CR 50/200 at bedtime   Sinemet carbidopa/levodopa 25/100 2-2-2-1  nizoral shampoo  nizoral cream  Latanoprost (xalatn) 0.005%  avastin injecdtion  pimavanserin (nuplazid) 17mg x 2 - never started   Quetiapine (seroquel) 25mg at night - never started.   Rantidine (zantac) 150mg 2/day  Ropinirole XL 4mg at 8-9pm  Trihexyphenidyl 2mg 7am 11-12 4pm 8-9pm      Present regimen      Meds                   7a 11a 4p 8-9p 9p        50/200 sinemet           1           25/100  sinemet 2 2  2  1               requip XL 4mg ropinirole         1           trihexy 2mg artane 1 1 1 1            seroquel 25mg       Not taking         pimavanserin 17mg       Not taking                                                Over 50% of this visit was spent in patient care and care coordination.      History obtained from patient     Total visit time was 25 minutes     PLAN  Reduce the artane (trihexyphenidyl) from 1 tablet 4 times per day every 3 days by one tablet and go off this medication eventually. So reduce by one tablet of artane every 3 days. So go from   1-1-1-1  (now) to   1-1-1 (24th November) to   1-1 (27 November) to   1 (30 November) to   0 (2nd December)     Meds                   7a 11a 4p 8-9p 9p        50/200 sinemet           1           25/100  sinemet 2 2  2  1               requip XL 4mg ropinirole         1           trihexy 2mg artane 1->0 1->0 1->0 1->0           seroquel 25mg quetiapine       Not taking         pimavanserin 17mg  nuplazid       Not taking         Rivastigmine patch  exelon patch       Not taking                              Hold off on starting seroquel (quetiapine) for now.   Hold off on starting pimavanserin (nuplazid) for now   Call us back in early December to report how you are doing off the artane.  If you have more tremor or problems while your artane dose is being reduced call us or send us a [employer] message.   Return back in 1-3 months.   Keep the sinemet 25/100 and sinemet 50/200 dose the same  Keep the ropinirole/requip XL dose the same.   In future will discuss rivastigmine (exelon) patch for memory issues.      Needs cardiology consult for bifascicular block.     Edison Villasenor MD            ______________________________________      Patient was asked about 14 Review of systems including changes in vision (dry eyes, double vision), hearing, heart, lungs, musculoskeletal, depression, anxiety, snoring, RBD, insomnia, urinary frequency, urinary urgency, constipation, swallowing problems, hematological, ID, allergies, skin problems: seborrhea, endocrinological:  thyroid, diabetes, cholesterol; balance, weight changes, and other neurological problems and these were not significant at this time except for   Patient Active Problem List   Diagnosis     Paralysis agitans (H)     Wears glasses     Balance problems     Constipation     GERD (gastroesophageal reflux disease)     Urinary urgency     Insomnia     Seborrhea     Lumbago     Hearing loss     Family history of Parkinson's disease     Central retinal vein occlusion     Choroidal nevus     Vision problem -- Right Eye     ED (erectile dysfunction)     Glaucoma     Abnormal electrocardiogram     Bifascicular block     Prolonged Q-T interval on ECG          Allergies   Allergen Reactions     Cats      Dogs      Past Surgical History:   Procedure Laterality Date     NO HISTORY OF SURGERY       Past Medical History:   Diagnosis Date     Abnormal electrocardiogram 3/23/2017     Balance problems      Bifascicular block 3/23/2017     Central retinal vein occlusion 4/11/2014     Choroidal nevus 4/11/2014     Constipation      Family history of Parkinson's disease      GERD (gastroesophageal reflux disease)      Glaucoma 6/4/2015     Hearing loss      Lumbago      Parkinson's disease (H)      Prolonged Q-T interval on ECG 3/23/2017     Urinary urgency      Wears glasses      Social History     Social History     Marital status: Single     Spouse name: N/A     Number of children: N/A     Years of education: N/A     Occupational History     farm Self Employed.     Social History Main Topics     Smoking status: Never Smoker     Smokeless tobacco: Never Used      Comment: cigar sometimes     Alcohol use Yes      Comment: occ     Drug use: No     Sexual activity: Yes     Partners: Female     Other Topics Concern     Not on file     Social History Narrative    1 daughter who is  and lives in [place]    She was pregnant with the 1st child and had some problems in the 6-7 month of pregnancy with     Baby that may have hydrocephalus  "as there was IUGR. The baby  after 3 months in 2011. She was encouraged her to undergo genetic testing which was negative and they are considering having more children.     He met a woman and may  a woman from [place] - who has Citizen of the [place] roots. 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 He met her 3 times. Her name is Tavia and lives in [facility]. They will be visiting within a day or so with her mother. She is 36 yrs old. She is not going back there.     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 Depending on the status of the main house may build new house.         [place] in his home town had parkinson    Germans lived on the other side of town        [facility] -- [place] of [place] 12 miles.         This was way before pesticides        Maternal grandfather     Dax Sorenson - [place] name     He would have to be brought out to a rocking chair    \"hardening of there arteries.         mother       Drug and lactation database from the [place] National Library of Medicine:  http://toxnet.nlm.nih.gov/cgi-bin/sis/htmlgen?LACT      B/P: Data Unavailable, T: Data Unavailable, P: Data Unavailable, R: Data Unavailable 0 lbs 0 oz  There were no vitals taken for this visit., There is no height or weight on file to calculate BMI.  Medications and Vitals not listed above were documented in the cart and reviewed by me.     Current Outpatient Prescriptions   Medication Sig Dispense Refill     carbidopa-levodopa (SINEMET)  MG per tablet 2 tab at 7am, 11 am & 4 pm; 1 tablet at 8-9pm.    == 7/day 630 tablet 3     carbidopa-levodopa (SINEMET CR)  MG per CR tablet 1 tab @ 9pm 90 tablet 3     Ropinirole HCl (REQUIP XL) 4 MG TB24 1 tab at 8-9pm 90 tablet 3     pimavanserin (NUPLAZID) 17 MG TABS tablet Take 2 tablets (34 mg) by mouth daily 180 tablet 3     QUEtiapine (SEROQUEL) 25 MG " tablet Take 1 tablet (25 mg) by mouth nightly as needed 30 tablet 11     latanoprost (XALATAN) 0.005 % ophthalmic solution One drop in the right eye in the morning and evening 2.5 mL 1     acetaminophen (TYLENOL) 325 MG tablet Take 2 tablets (650 mg) by mouth every 6 hours as needed for mild pain 100 tablet      Albuterol Sulfate 108 (90 BASE) MCG/ACT AEPB Inhale 2 puffs into the lungs 4 times daily as needed       ranitidine (ZANTAC) 150 MG tablet Take 1 tablet (150 mg) by mouth 2 times daily 180 tablet 3     aspirin 325 MG tablet Take 1 tablet (325 mg) by mouth daily 180 tablet      ketoconazole (NIZORAL) 2 % shampoo Apply to the affected area and wash off after 5 minutes. 120 mL 11     ketoconazole (NIZORAL) 2 % cream Apply topically daily Apply topical daily 60 g 11     NONFORMULARY avastin injection of the right eye           Edison Villasenor MD    Again, thank you for allowing me to participate in the care of your patient.      Sincerely,    Edison Villasenor MD

## 2018-02-20 NOTE — PATIENT INSTRUCTIONS
PATIENT: Eamon Whalen  62 year old male     : 1955    MERCEDES: 2018    Parkinson  He has shuffling and freezing of gait - primarily on initiating his gait.     emg study results are probably artifactual as he does not appear to have clinical features that support the electrical findings.     Biopsy of his prostate pending tomorrow    He is urinating during the night.   Last night he slept through the night.   He wants to get a rocker and can sleep with th is.   His mattress - he is trying to return as he has nocturnal akinesia that is affecting his mobility    He goes to bed at 1030pm  Wakes up at 2-3am to  Urinate and has problems going back to sleep   He shuffles during the night.   He was unable to get comfortable and may not be able to fall back asleep  Before he was waking up at 630/7am  Now he is waking up at 530am.   If he is uncomfortable he gets up and gets out the bed and falls asleep in a chair in the kitchen if he can fall asleep but is not rested with this position or sleeps on a  but it is too small to sleep on.     His 7am dose - not necessary having clear off or on periods but when asked he has periods where he is shuffling and times when he is moving well but cannot designate the times.     He has not been on rytary         Medications     7am 11am 4pm 8-9pm 1030pm 2-3am   Acetaminophen tylenol 325mg Not taking        Albuterol sulfate inhaler Not taking        Aspirin 325mg Not this week        Carbidopa/levodopa 50/200 CR Sinemet    1     Carbidopa/levodopa 25/100 Sinemet 2 2 2 1 Goes to bed 1   Dorzolamide timolol   Yellow cap ?correct medication?  1 drop both eyes        Ibuprofen 200mg As needed        Ketoconazole cream         Ketoconazole shampoo         Latanoprost xalatan ophthalmic solution  ?correct?    1 drop both eyes evening     nonformulary avastin Stopped for now        Pimavanserin nuplazid 17mg Not taking        Quetiapine seroquel 25mg Not taking         Ranitidine zantac 150mg Not taking         Ropinirole Requip XL 4mg    1       History obtained from patient     PLAN  Seborrhea -   ketoconazole shampoo   ketoconazole cream   Derma smoothe use once per week    Getting a prostate biopsy by Dr. Goldberg tomorrow as his psa was high. He had a mri  He has had a psa of 10. He is off aspirin.    He has fatigue  Not sure if he has had cbc, sed rate, crp, thyroid, paraneoplastic panel, metabolic screen, vitamin b12/mma and folate as we dont have access to records. May want to review with his pcp    His emg is abnormal but this is probably artifactual.     He has a new mattress  Suspect that he has  Nocturnal akinesia and that with the night time dose of sinemet this may help.   He will add a night time sinemet to see if helps his mobility through the night and when he wakes up in the morning.     Remains off the antipsychotics    He came by [employer] - transportation issue    Consider a visit with Sheree Neumann, Pharm D to discuss medication options besides sinemet above and down the road consider rytary to replace his sinemet regimen if needed.     Return visit in 6 months.     Coding statement:   Duration of  Services: patient care and care coordination was 25 minutes  Greater than 50% of this visit was spent in counseling and coordination of care.     Edison Villasenor MD

## 2018-02-20 NOTE — NURSING NOTE
Chief Complaint   Patient presents with     RECHECK     P RETURN - MOVEMENT DISORDER     Teresa Ag MA

## 2018-02-20 NOTE — LETTER
2/20/2018       RE: Eamon Whalen  [address]  [place] [address]     Dear Colleague,    Thank you for referring your patient, Eamon Whalen, to the [facility] EMG at [facility]. Please see a copy of my visit note below.        [facility]  Electrodiagnostic Laboratory    Nerve Conduction & EMG Report          Patient:       Eamon Whalen  Patient ID:    [id number]  Gender:        Male  YOB: 1955  Age:           62 Years 2 Months      History & Examination:    62 year old man with history of Parkinson's disease, restless leg syndrome, and paresthesias in feet and legs. Query polyneuropathy. His clinical examination shows vibration of about 10 seconds at the toes and medial malleoli (both normal), normal light touch and position sensation at the toes, without right-vs-left side differences.    Techniques: Motor conduction studies were done with surface recording electrodes. Sensory conduction studies were performed with surface electrodes, unless indicated otherwise by (n), designating the use of subdermal recording electrodes. Temperature was monitored and recorded throughout the study. Upper extremities were maintained at a temperature of 32 degrees Centigrade or higher.  Lower extremities were maintained at a temperature of 31degrees Centigrade or higher. EMG was done with a concentric needle electrode.     Results:    Right sural antidromic sensory NCS was normal. Left sural SNAP was absent; the study was attempted with surface and needle subdermal recording electrodes, multiple times. Left superficial peroneal SNAP was absent. Right superficial peroneal sensory NCS showed attenuated SNAP amplitude and normal conduction velocity. Bilateral deep peroneal and the right tibial motor NCSs were normal. Right tibial F-wave latencies were normal. EMG of bilateral tibialis anterior, left medial gastrocnemius, and left vastus medialis was normal. EMG of  right medial gastrocnemius showed increased insertional activity and 2+ fasciculations, but no sustained fibrillations/positive waves, and MUP morphology and recruitment patterns were normal. EMG of right vastus lateralis showed no abnormal spontaneous activity, and some large, long duration, stable MUPs with normal recruitment patterns.     Interpretation:    1. Abnormalities of sensory nerve conduction studies of uncertain significance. Possibilities include technical problems, a left sural sensory mononeuropathy, or an asymmetric sensory polyneuropathy/mononeuropathy multiplex. Please see comment.   2. Mild chronic neurogenic changes at the right vastus lateralis. Differential diagnosis includes right chronic L4 radiculopathy vs femoral neuropathy.     Comment: The fact that the clinical sensory examination at the ankles and toes is symmetric makes it less likely that the asymmetric findings of the sensory nerve conduction studies are clinically significant or relevant. No obvious technical problem was identified during the stimulation. Please note that superficial peroneal sensory studies in individuals over age 60 are often challenging and a number of healthy adults above that age may show absent responses. A diagnosis of polyneuropathy is not possible without clear clinical correlation in this case.     EMG Physician:    Taye De Guzman MD       Sensory NCS      Nerve / Sites Rec. Site Onset Peak NP Amp Ref. PP Amp Dist Dennis Ref. Temp     ms ms  V  V  V cm m/s m/s  C   R SURAL - Lat Mall 60      Calf Ankle 2.60 3.59 6.1 5.0 5.2 14 53.8 38.0 31.9   L SURAL - Lat Mall 60      Calf Ankle    5.0  14  38.0 31.7      Calf Ankle (n) NR NR NR 5.0 NR 14 NR  31.6   R SUP PERONEAL      Lat Leg Bailey 2.81 3.44 2.8  1.1 12.5 44.4 38.0 32.2   L SUP PERONEAL      Lat Leg Bailey NR NR NR  NR 12.5 NR 38.0 32.1       Motor NCS      Nerve / Sites Rec. Site Lat Ref. Amp Ref. Rel Amp Dist Dennis Ref. Dur. Area Temp.     ms ms mV mV  % cm m/s m/s ms %  C   R DEEP PERONEAL - EDB 60      Ankle EDB 3.49 6.00 3.8 2.0 100 8   5.99 100 31.9      FibHead EDB 10.83  3.8  99.4 33 44.9 38.0 6.61 104 32.1      Pop Fos EDB 12.50  3.7  96 8 48.0 38.0 6.72 97.4 32.2   L DEEP PERONEAL - EDB 60      Ankle EDB 4.01 6.00 4.6 2.0 100 8   5.99 100 31.7   R TIBIAL - AH      Ankle AH 3.13 6.00 14.1 4.0 100 8   4.53 100 32.4      Pop Fos AH 11.56  9.9  70.2 43 51.0 38.0 5.26 91.3 32.2       F  Wave      Nerve Min F Lat Max F Lat Mean FLat Temp.    ms ms ms  C   R TIBIAL 50.47 58.59 53.93 33       EMG Summary Table     Spontaneous MUAP Recruitment    IA Fib PSW Fasc H.F. Amp Dur. PPP Pattern   R. TIB ANTERIOR N None None None None N N N N   R. GASTROCN (MED) Increased None None 2+ (slow) None N N N N   R. VAST LATERALIS N None None None None 1+ 1+ N N   L. TIB ANTERIOR N None None None None N N N N   L. GASTROCN (MED) N None None None None N N N N   L. VAST MEDIALIS N None None None None N N N N                       Again, thank you for allowing me to participate in the care of your patient.      Sincerely,    Taye De Guzman MD

## 2018-02-20 NOTE — MR AVS SNAPSHOT
After Visit Summary   2018    Eamon Whalen    MRN: [id number]           Patient Information     Date Of Birth          1955        Visit Information        Provider Department      2018 2:40 PM Edison Villasenor MD [facility] Neurology        Today's Diagnoses     Parkinson's disease (H)    -  1    Paralysis agitans (H)        Restless leg syndrome        Seborrhea          Care Instructions      PATIENT: Eamon Whalen  62 year old male     : 1955    MERCEDES: 2018    Parkinson  He has shuffling and freezing of gait - primarily on initiating his gait.     emg study results are probably artifactual as he does not appear to have clinical features that support the electrical findings.     Biopsy of his prostate pending tomorrow    He is urinating during the night.   Last night he slept through the night.   He wants to get a rocker and can sleep with th is.   His mattress - he is trying to return as he has nocturnal akinesia that is affecting his mobility    He goes to bed at 1030pm  Wakes up at 2-3am to  Urinate and has problems going back to sleep   He shuffles during the night.   He was unable to get comfortable and may not be able to fall back asleep  Before he was waking up at 630/7am  Now he is waking up at 530am.   If he is uncomfortable he gets up and gets out the bed and falls asleep in a chair in the kitchen if he can fall asleep but is not rested with this position or sleeps on a  but it is too small to sleep on.     His 7am dose - not necessary having clear off or on periods but when asked he has periods where he is shuffling and times when he is moving well but cannot designate the times.     He has not been on rytary         Medications     7am 11am 4pm 8-9pm 1030pm 2-3am   Acetaminophen tylenol 325mg Not taking        Albuterol sulfate inhaler Not taking        Aspirin 325mg Not this week        Carbidopa/levodopa 50/200 CR Sinemet    1      Carbidopa/levodopa 25/100 Sinemet 2 2 2 1 Goes to bed 1   Dorzolamide timolol   Yellow cap ?correct medication?  1 drop both eyes        Ibuprofen 200mg As needed        Ketoconazole cream         Ketoconazole shampoo         Latanoprost xalatan ophthalmic solution  ?correct?    1 drop both eyes evening     nonformulary avastin Stopped for now        Pimavanserin nuplazid 17mg Not taking        Quetiapine seroquel 25mg Not taking        Ranitidine zantac 150mg Not taking         Ropinirole Requip XL 4mg    1       History obtained from patient     PLAN  Seborrhea -   ketoconazole shampoo   ketoconazole cream   Derma smoothe use once per week    Getting a prostate biopsy by Dr. Goldberg tomorrow as his psa was high. He had a mri  He has had a psa of 10. He is off aspirin.    He has fatigue  Not sure if he has had cbc, sed rate, crp, thyroid, paraneoplastic panel, metabolic screen, vitamin b12/mma and folate as we dont have access to records. May want to review with his pcp    His emg is abnormal but this is probably artifactual.     He has a new mattress  Suspect that he has  Nocturnal akinesia and that with the night time dose of sinemet this may help.   He will add a night time sinemet to see if helps his mobility through the night and when he wakes up in the morning.     Remains off the antipsychotics    He came by [employer] - transportation issue    Consider a visit with Sheree Neumann, Pharm D to discuss medication options besides sinemet above and down the road consider rytary to replace his sinemet regimen if needed.     Return visit in 6 months.     Coding statement:   Duration of  Services: patient care and care coordination was 25 minutes  Greater than 50% of this visit was spent in counseling and coordination of care.     Edison Villasenor MD             Follow-ups after your visit        Additional Services     DERMATOLOGY REFERRAL       Your provider has referred you to: adult    Please be aware that coverage of  these services is subject to the terms and limitations of your health insurance plan.  Call member services at your health plan with any benefit or coverage questions.      Please bring the following with you to your appointment:    (1) Any X-Rays, CTs or MRIs which have been performed.  Contact the facility where they were done to arrange for  prior to your scheduled appointment.    (2) List of current medications  (3) This referral request   (4) Any documents/labs given to you for this referral            MED THERAPY MANAGE REFERRAL       Your provider has referred you to: rubia waterman  Reason for Referral: Parkinson    [facility] department will contact you to schedule an appointment.  You may also schedule the appointment by calling [phone].  For [facility] [place] patients, please call [phone] to confirm/schedule your appointment on the next business day.    This service is designed to help you get the most from your medications.  A specially trained Pharmacist will work closely with you and your providers to solve any questions, concerns, issues or problems related to your medications.    Please bring all of your prescription and non-prescription medications (such as vitamins, over-the-counter medications, and herbals) or a detailed medication list to your appointment.    If you have a glucose meter or other home monitoring information, please also bring this to your appointment (i.e. blood glucose log, blood pressure log, pain log, etc.).                  Follow-up notes from your care team     Return in about 6 months (around 8/20/2018).      Your next 10 appointments already scheduled     Feb 20, 2018  3:30 PM CST   (Arrive by 3:15 PM)   [facility] ([facility]    [address] [place] [address] [phone]           Bring a current list  of meds and any records pertaining to this visit. For Physicals, please bring immunization records and any forms needing to be filled out. Please arrive 10 minutes early to complete paperwork.            May 22, 2018 12:10 PM CDT   (Arrive by 11:55 AM)   Return Movement Disorder with Edison Villasenor MD   [facility] ([facility])    [address] [place] [address] [phone]            May 22, 2018  1:00 PM CDT   (Arrive by 12:45 PM)   SHORT with Sheree Neumann [facility] ([facility])    [address] [place] [id number] [phone]              Future tests that were ordered for you today     Open Future Orders        Priority Expected Expires Ordered    Needle EMG Each Extremity w/Paraspinal Area Limited (52644) Routine  2/20/2019 2/20/2018            Who to contact     Please call your clinic at [phone] to:    Ask questions about your health    Make or cancel appointments    Discuss your medicines    Learn about your test results    Speak to your doctor            Additional Information About Your Visit        Seclore Information     Se[employer]ore gives you secure access to your electronic health record. If you see a primary care provider, you can also send messages to your care team and make appointments. If you have questions, please call your primary care clinic.  If you do not have a primary care provider, please call [phone] and they will assist you.      Seclore is an electronic gateway that provides easy, online access to your medical records. With [employer], you can request a clinic appointment, read your test results, renew a prescription or communicate with your care team.     To access your existing account, please contact your [facility] or call [phone] for assistance.        Care EveryWhere ID     This is your Care  "EveryWhere ID. This could be used by other organizations to access your [place] medical records  [id number]        Your Vitals Were     Pulse Height Pulse Oximetry BMI (Body Mass Index)          78 1.753 m (5' 9\") 99% 22.55 kg/m2         Blood Pressure from Last 3 Encounters:   02/20/18 (!) 168/100   11/21/17 141/78   03/23/17 144/83    Weight from Last 3 Encounters:   02/20/18 69.3 kg (152 lb 11.2 oz)   11/21/17 65.8 kg (145 lb)   03/23/17 70.4 kg (155 lb 4.8 oz)              We Performed the Following     DERMATOLOGY REFERRAL     MED THERAPY MANAGE REFERRAL          Today's Medication Changes          These changes are accurate as of 2/20/18  3:13 PM.  If you have any questions, ask your nurse or doctor.               Start taking these medicines.        Dose/Directions    fluocinolone acetonide 0.01 % oil   Commonly known as:  DERMA-SMOOTHE/FS BODY   Used for:  Seborrhea, Paralysis agitans (H)   Started by:  Edison Villasenor MD        Wet hair and apply to scalp and leave in for 4 hours and then wash off. May use 1 times per week   Quantity:  2 Bottle   Refills:  11         These medicines have changed or have updated prescriptions.        Dose/Directions    * carbidopa-levodopa  MG per CR tablet   Commonly known as:  SINEMET CR   This may have changed:  additional instructions   Used for:  Paralysis agitans (H)   Changed by:  Edison Villasenor MD        1 tab by mouth @ 9pm   Quantity:  90 tablet   Refills:  3       * carbidopa-levodopa  MG per tablet   Commonly known as:  SINEMET   This may have changed:  You were already taking a medication with the same name, and this prescription was added. Make sure you understand how and when to take each.   Used for:  Paralysis agitans (H)   Changed by:  Edison Villasenor MD        By mouth: 2 tab at 7am, 11 am & 4 pm; 1 tab at 8-9pm and 1 tab @2-3am.    == 8/day   Quantity:  720 tablet   Refills:  3       Ropinirole HCl 4 MG Tb24   Commonly known " as:  REQUIP XL   This may have changed:  additional instructions   Used for:  Parkinson's disease (H), Restless leg syndrome   Changed by:  Edison Villasenor MD        1 tab by mouth at 8-9pm   Quantity:  90 tablet   Refills:  3       XALATAN 0.005 % ophthalmic solution   This may have changed:  additional instructions   Generic drug:  latanoprost   Changed by:  Edison Villasenor MD        One drop in both eyes in the evening   Quantity:  2.5 mL   Refills:  1       * Notice:  This list has 2 medication(s) that are the same as other medications prescribed for you. Read the directions carefully, and ask your doctor or other care provider to review them with you.      Stop taking these medicines if you haven't already. Please contact your care team if you have questions.     acetaminophen 325 MG tablet   Commonly known as:  TYLENOL   Stopped by:  Edison Villasenor MD           NONFORMULARY   Stopped by:  Edison Villasenor MD           SEROQUEL 25 MG tablet   Generic drug:  QUEtiapine   Stopped by:  Edison Villasenor MD                Where to get your medicines      These medications were sent to [id number], [place] - [address] [facility]  [address] [facility]) [place] [address]     Phone:  [phone]     carbidopa-levodopa  MG per tablet    carbidopa-levodopa  MG per CR tablet    fluocinolone acetonide 0.01 % oil    ketoconazole 2 % cream    ketoconazole 2 % shampoo    Ropinirole HCl 4 MG Tb24                Primary Care Provider Office Phone # Fax #    Bernardino NAHUN John [phone]       [facility] [address]  [facility] [address]        Equal Access to Services     [facility]: Hadii aad ku hadasho Soomaali, waaxda luqadaha, qaybta kaalmada adeegyada, paco miranda. So [facility] [phone].    ATENCIÓN: Si habla español, tiene a olmstead disposición servicios gratuitos de asistencia lingüística. Llame  al [phone].    We comply with applicable federal civil rights laws and [place] laws. We do not discriminate on the basis of race, color, national origin, age, disability, sex, sexual orientation, or gender identity.            Thank you!     Thank you for choosing [facility]  for your care. Our goal is always to provide you with excellent care. Hearing back from our patients is one way we can continue to improve our services. Please take a few minutes to complete the written survey that you may receive in the mail after your visit with us. Thank you!             Your Updated Medication List - Protect others around you: Learn how to safely use, store and throw away your medicines at www.disposemymeds.org.          This list is accurate as of 2/20/18  3:13 PM.  Always use your most recent med list.                   Brand Name Dispense Instructions for use Diagnosis    aspirin 325 MG tablet     180 tablet    Take 1 tablet (325 mg) by mouth daily        * carbidopa-levodopa  MG per CR tablet    SINEMET CR    90 tablet    1 tab by mouth @ 9pm    Paralysis agitans (H)       * carbidopa-levodopa  MG per tablet    SINEMET    720 tablet    By mouth: 2 tab at 7am, 11 am & 4 pm; 1 tab at 8-9pm and 1 tab @2-3am.    == 8/day    Paralysis agitans (H)       COSOPT 2-0.5 % ophthalmic solution   Generic drug:  dorzolamide-timolol     10 mL    Place 1 drop into both eyes daily        fluocinolone acetonide 0.01 % oil    DERMA-SMOOTHE/FS BODY    2 Bottle    Wet hair and apply to scalp and leave in for 4 hours and then wash off. May use 1 times per week    Seborrhea, Paralysis agitans (H)       * ketoconazole 2 % shampoo    NIZORAL    120 mL    Apply to the affected area and wash off after 5 minutes.    Paralysis agitans (H), Seborrhea       * ketoconazole 2 % cream    NIZORAL    60 g    Apply topically daily Apply topical daily    Seborrhea, Paralysis agitans (H)       Ropinirole HCl 4 MG Tb24    REQUIP XL     90 tablet    1 tab by mouth at 8-9pm    Parkinson's disease (H), Restless leg syndrome       XALATAN 0.005 % ophthalmic solution   Generic drug:  latanoprost     2.5 mL    One drop in both eyes in the evening        * Notice:  This list has 4 medication(s) that are the same as other medications prescribed for you. Read the directions carefully, and ask your doctor or other care provider to review them with you.

## 2018-02-22 ENCOUNTER — TELEPHONE (OUTPATIENT)
Dept: NEUROLOGY | Facility: CLINIC | Age: 63
End: 2018-02-22

## 2018-02-22 NOTE — TELEPHONE ENCOUNTER
"Called patient to let him know that Dr. Villasenor will not be writing a letter until he sees an evaluation that shows he has passed all requirements. He stated that the evaluation \"was a joke, they said my reactions were too slow.\" I suggested he contact his county  to see what can be arranged in order for him to go about the business of obtaining transportation for daily goods and services.  "

## 2018-02-22 NOTE — TELEPHONE ENCOUNTER
----- Message from Edison Villasenor MD sent at 2/22/2018  2:13 PM CST -----  Regarding: RE: [place]/PT needs letter to renew his DL  Contact: [phone]  Correct    ----- Message -----     From: Jeaneth Abdullahi, RN     Sent: 2/22/2018   2:08 PM       To: Edison Villasenor MD  Subject: FW: [place]/PT needs letter to renew his DL        You're not ok with him driving without a formal evaluation are you?    I've gone over this so many times with him. I have told him you will not write a letter unless you see a current driving evaluation stating he has passed all of the requirements.    Jeaneth  ----- Message -----     From: Araceli Weeks RN     Sent: 2/22/2018  12:15 PM       To: Jeaneth Abdullahi RN  Subject: FW: [place]/PT needs letter to renew his DL            ----- Message -----     From: Sis Nunez     Sent: 2/22/2018  12:09 PM       To: Wil Fitzpatrick, FAYE, Stacey Penn, FAYE, #  Subject: [place]/PT needs letter to renew his DL            Call from PT as he forgot to mention yesterday in his appt that he needs a letter stating it is OK for him to drive.  The DMV failed to renew his license until he gets the letter and he needs that or next best step ASAP.  Please call him at [phone]    Thank You,  Sis    Please DO NOT send this message and/or reply back to sender.  Call Center Representatives DO NOT respond to messages.

## 2018-02-28 DIAGNOSIS — G20.A1 PARALYSIS AGITANS (H): Primary | ICD-10-CM

## 2018-02-28 NOTE — TELEPHONE ENCOUNTER
Patient left message for MTM pharmacist to discuss Rytary coverage and other medication options. MTM pharmacist called him back to discuss options. He reports that he continues to have a difficult time sleeping through the night. His tremor overnight has worsened and he has a difficult time getting out of bed. He tried the overnight dose of Sinemet once and states he doesn't want to get in the habit of needing to get up ever night to take it. He is already taking CR Sinemet at bedtime, along with an IR dose.    Patient would like to pursue insurance coverage of Rytary as discussed at clinic visit on 2/20/18. Based on his current dose of 700 mg of IR levodopa and 200 mg of CR levodopa, he would need Rytary 145 mg 3 capsules three times daily. This medication is not covered by [facility], so will need to complete PA.    Will route to Dr. Villasenor to sign if okay with plan.     Sheree Neumann, Pharm.D.  Medication Therapy Management Resident  Phone: [phone]  Pager: [phone]

## 2018-03-01 ENCOUNTER — TELEPHONE (OUTPATIENT)
Dept: PHARMACY | Facility: CLINIC | Age: 63
End: 2018-03-01

## 2018-03-01 NOTE — TELEPHONE ENCOUNTER
Prior Authorization Retail Medication Request  Medication/Dose: carbidopa-levodopa ER (RYTARY) 36. MG CPCR per CR capsule   Diagnosis and ICD code: Parkinson's disease (H) [G20]  - Primary   New/Renewal/Insurance Change PA: New  Previously Tried and Failed Therapies: Sinemet CR, Sinemet, and Ropinorole     Insurance ID (if provided): unknown  Insurance Phone (if provided): unknown    Any additional info from fax request:     If you received a fax notification from an outside Pharmacy:  Pharmacy Name:  Pharmacy #:  Pharmacy Fax:

## 2018-03-01 NOTE — LETTER
To whom this may concern:       We are asking for consideration of an appeal for our patient, Eamon Whalen, for Rytary 36. mg capsules 3 capsules three times daily for treatment of Parkinson's Disease motor fluctuations. The immediate-release carbidopa-levodopa is not adequately controlling his akinesia overnight nor motor symptoms during the day.     The alternative agents listed in the PA denial decision are both dopamine agonists (bromocriptine and pramipexole), yet he is already taking a dopamine agonist, ropinirole XL 4 mg daily. It would be inappropriate for him to take multiple dopamine agonists concurrently.     Therefore, we are appealing this PA denial decision and would like for you to re-consider approval of Rytary for this patient's Parkinson's Disease.     Thank you,              Edison Villasenor MD  [phone]

## 2018-03-01 NOTE — TELEPHONE ENCOUNTER
Central Prior Authorization Team   Phone: [phone]      PA Initiation    Medication: carbidopa-levodopa ER (RYTARY) 36. MG CPCR per CR capsule-PA Initiated  Insurance Company: [employer] [facility] - Phone [phone] Fax [phone]  Pharmacy Filling the Rx: [id number] [address]  Filling Pharmacy Phone: [phone]  Filling Pharmacy Fax: [phone]  Start Date: 3/1/2018

## 2018-03-02 NOTE — TELEPHONE ENCOUNTER
PRIOR AUTHORIZATION DENIED    Medication: carbidopa-levodopa ER (RYTARY) 36. MG CPCR per CR capsule-DENIED    Denial Date: 3/2/2018    Denial Rational:             Appeal Information:

## 2018-03-02 NOTE — TELEPHONE ENCOUNTER
Ordered for Rytary Rx signed by Dr. Villasenor on 3/1/18.     Sheree Neumann, Pharm.D.  Medication Therapy Management Resident  Phone: [phone]  Pager: [phone]

## 2018-03-02 NOTE — TELEPHONE ENCOUNTER
Central Prior Authorization Team   Phone: [phone]    Received call from insurance. Medication does not meet federal guidelines as an urgent request. Will work on appeal as standard.

## 2018-03-02 NOTE — TELEPHONE ENCOUNTER
Medication Appeal Initiation    We have initiated an appeal for the requested medication:  Medication: carbidopa-levodopa ER (RYTARY) 36. MG CPCR per CR capsule-Appeal Initiated  Appeal Start Date:  3/2/2018  Insurance Company: [employer] - Phone [phone] Fax [phone]  Comments:  Faxed appeal to [phone]. Call [phone] for follow up.

## 2018-03-13 ENCOUNTER — TELEPHONE (OUTPATIENT)
Dept: PHARMACY | Facility: CLINIC | Age: 63
End: 2018-03-13

## 2018-03-13 NOTE — TELEPHONE ENCOUNTER
"Called provider services to follow up on the appeal and spoke to rep Torres who stated that the appeal was \"close out\" on 3/5/18. Requested the  \"close out\" letter be faxed over for documentation. Per Melissa, it was closed out because the appeal has to be written and signed by the provider. \"On behalf of\" is not acceptable. Will need a new letter of medically necessity from the MD and resubmit to insurance again. Asked if they had notified anyone as we did not get a call or letter stating this and according the Melissa, she does not show that this was communicated to anyone.   "

## 2018-03-13 NOTE — TELEPHONE ENCOUNTER
Patient is calling regarding Rytary appeal. He received paperwork regarding the appeal and he would like a call back to see what the next steps are.     Sheree Neumann, Pharm.D.  Medication Therapy Management Resident  Phone: [phone]  Pager: [phone]

## 2018-03-13 NOTE — TELEPHONE ENCOUNTER
"Left message for patient and informed him that he does need to fill out the paperwork and send to [facility]. He needs to designate on page 3 that he gives authority to \"all staff working with Dr. Villasenor.\"    Wrote a new appeal letter signed by Dr. Villasenor and faxed to PA team [phone].    Sheree Neumann, Pharm.D.  Medication Therapy Management Resident  Phone: [phone]  Pager: [phone]  "

## 2018-03-14 NOTE — TELEPHONE ENCOUNTER
Re-faxed new appeal letter to [employer] and noted to please expedite review as the first one was closed without any communication of letting anyone know. Faxed to [phone].

## 2018-03-14 NOTE — TELEPHONE ENCOUNTER
Central Prior Authorization Team   Phone: [phone]    Appeal has been overturned and has been approved per [employer].  May take a little bit to update in the system. We will get a letter/fax shortly.    Dates: 3/14/2018 - 3/14/2019   Case#: [id number]  Provider service #: [phone]    Pt will get a call from case liaison from [employer].

## 2018-03-14 NOTE — TELEPHONE ENCOUNTER
Add approval letter when received from insurance    MEDICATION APPEAL APPROVED    Medication: carbidopa-levodopa ER (RYTARY) 36. MG CPCR per CR capsule-Appeal Approved  Authorization Effective Date: 3/14/2018  Authorization Expiration Date: 3/14/2019  Approved Dose/Quantity:   Reference #: [id number]   Insurance Company: [employer] - Phone [phone] Fax [phone]  Expected CoPay:      CoPay Card Available:      Foundation Assistance Needed:    Which Pharmacy is filling the prescription (Not needed for infusion/clinic administered): [id number] ([place]), [place] - [address] [place] [address]

## 2018-03-16 ENCOUNTER — TELEPHONE (OUTPATIENT)
Dept: NEUROLOGY | Facility: CLINIC | Age: 63
End: 2018-03-16

## 2018-03-16 NOTE — TELEPHONE ENCOUNTER
Spoke to Sarath and let him know that the patient had a  evaluation and did not pass. He wanted to be sure the patient did not get missed.

## 2018-03-16 NOTE — TELEPHONE ENCOUNTER
----- Message from Valentina Fisher sent at 3/7/2018  3:55 PM CST -----  Regarding: Driving assesment questions  Contact: [phone]  Sarath from [facility] Driving Assessment [place] called to f/u on an order that was sent by Dr Villasenor in September. Please call back Sarath at [phone]. Thanks      MB    Please DO NOT send this message and/or reply back to sender.  Call Center Representatives DO NOT respond to messages.

## 2018-03-21 NOTE — TELEPHONE ENCOUNTER
Patient calling back to ask about status of Rytary appeal.    Sheree Neumann, Pharm.D.  Medication Therapy Management Resident  Phone: [phone]  Pager: [phone]

## 2018-03-22 ENCOUNTER — TELEPHONE (OUTPATIENT)
Dept: NEUROLOGY | Facility: CLINIC | Age: 63
End: 2018-03-22

## 2018-03-22 NOTE — TELEPHONE ENCOUNTER
Spoke with patient about new Rytary prescription. He reports that he received 3 bottles from the pharmacy. We discussed switching from Sinemet to Rytary.  He will start taking Rytary tomorrow.    Sheree Neumann, Pharm.D.  Medication Therapy Management Resident  Phone: [phone]  Pager: [phone]

## 2018-03-22 NOTE — TELEPHONE ENCOUNTER
A Pharmacist Snow ([phone]) called from [facility] in [place] and said the Patient picked up meds yesterday and she wants to confirm that the carbidopa-levodopa ER (RYTARY) 36. MG CPCR  is replacing his old Immediate release Carbidopa-levodopa during daytime and he is to continue taking the Carbidopa-levadopa ER in the evening?    She wants you to call her back and confirm that this is correct, and that the Rytary replaces the Carb-Lev immediate release as well.            Daytime- carbidopa-levodopa ER (RYTARY) 36. MG CPCR   sig: Take 3 capsules by mouth 3 times daily    Evening-    carbidopa-levodopa (SINEMET CR)  MG per CR tablet      Si tab by mouth @ 9pm

## 2018-03-23 NOTE — TELEPHONE ENCOUNTER
From Dr. Villasenor's office visit note 18:    Suspect that he has  Nocturnal akinesia and that with the night time dose of sinemet this may help.   He will add a night time sinemet to see if helps his mobility through the night and when he wakes up in the morning.      Remains off the antipsychotics     He came by blue ride - transportation issue     Consider a visit with Sheree Neumann, Pharm D to discuss medication options besides sinemet above and down the road consider rytary to replace his sinemet regimen if needed.     From Sheree Neumann's office visit note 18:    PLAN:         1. Patient instructed to start taking an overnight Sinemet dose around 2-3 am.   2. Patient given rx's for ketoconazole shampoo and cream and [facility] pharmacist provided some education on these products.  3. [facility] pharmacist to check insurance coverage of Rytary: Rytary is not on the [employer] [place] formulary. Consider pursing PA/appeal if the overnight Sinemet dose is not effective for him.  ----------------  Meds listed in chart:  carbidopa-levodopa ER (RYTARY) 36. MG CPCR per CR capsule 270 capsule 11 3/1/2018  --   Sig: Take 3 capsules by mouth 3 times daily     carbidopa-levodopa (SINEMET CR)  MG per CR tablet 90 tablet 3 2018  No   Si tab by mouth @ 9pm        Disp Refills Start End ELIZABETH   carbidopa-levodopa (SINEMET)  MG per tablet 720 tablet 3 2018  No   Sig: By mouth: 2 tab at 7am, 11 am & 4 pm; 1 tab at 8-9pm and 1 tab @2-3am.    == 8/day     Ropinirole HCl (REQUIP XL) 4 MG TB24 90 tablet 3 2018  No   Si tab by mouth at 8-9pm

## 2018-03-23 NOTE — TELEPHONE ENCOUNTER
Medication list updated to reflect patient's current regimen of Rytary ONLY 36.25/145 mg 3 capsules three times daily.    [facility] informed and they have updated their records as well.    Sheree Neumann, Pharm.D.  Medication Therapy Management Resident  Phone: [phone]  Pager: [phone]

## 2018-03-27 ENCOUNTER — TELEPHONE (OUTPATIENT)
Dept: NEUROLOGY | Facility: CLINIC | Age: 63
End: 2018-03-27

## 2018-03-27 NOTE — TELEPHONE ENCOUNTER
Patient was instructed last week to take Rytary 3 capsules three times/day and continue ropinirole XL 4 mg at bedtime. He was instructed to stop taking Sinemet IR and Sinemet CR. I don't think he's getting enough medication and may be in withdrawal if he stopped ropinirole suddenly and is only take 3/day of Rytary.    Please let me know if there is any way I can help.    Sheree Neumann, Pharm.D.  Medication Therapy Management Resident  Phone: [phone]  Pager: [phone]

## 2018-03-27 NOTE — TELEPHONE ENCOUNTER
----- Message from Vicki Bazzi RN sent at 3/27/2018  9:16 AM CDT -----  Regarding: FW: Dr. Villasenor pt calling with Symptoms, Pt can not use legs and tremors worse  Contact: [phone]      ----- Message -----     From: Luann Bianchi     Sent: 3/27/2018   9:05 AM       To: Wil Fitzpatrick RN, Stacey Penn RN, #  Subject: Dr. Villasenor pt calling with Symptoms, Pt can n#    Pt calling started taking Rx. carbidopa-levodopa ER (RYTARY) on Saturday and last night fell and took him 30 minutes to get up. Per pt today his legs do not work at all and they are still tremors in the legs. Pt is scared and is asking for a medical professional to call him back as soon as possible today 3/27/18.  Pt can be reached at  [phone]    Thank You,  Luann    Please DO NOT send this message and/or reply back to sender.  Call Center Representatives DO NOT respond to messages.

## 2018-03-27 NOTE — TELEPHONE ENCOUNTER
Situation:    New patient symptoms of falling and difficulty getting up    Background:    carbidopa-levodopa ER (RYTARY) 36. MG CPCR per CR capsule 270 capsule 11 3/1/2018  --   Sig: Take 3 capsules by mouth 3 times daily     Ropinirole HCl (REQUIP XL) 4 MG TB24 90 tablet 3 2018  No   Si tab by mouth at 8-9pm     From Dr. Villasenor's office visit note 18:  Suspect that he has  Nocturnal akinesia and that with the night time dose of sinemet this may help.     Assessment:    Called patient. He is on his way to the hospital. He fell last night and is very concerned about the tremors in his legs and lack of strength in the legs. Today is the 4th day on Rytary. He has been taking 1 capsule three times per day, he is no longer on ropinirole, not taking carbidopa/levodopa  CR. Patient very confused as to how he should be taking his Parkinson's disease medication.     Recommendation:    Med list should be updated to reflect that he is no longer taking ropinirole if that is what Dr. Villasenor wants.  I asked him to call me when he gets back from the hospital so we can go over his regimen.   I will alert [facility] pharmacist of patient's confusion with medications.

## 2018-03-28 ENCOUNTER — TELEPHONE (OUTPATIENT)
Dept: NEUROLOGY | Facility: CLINIC | Age: 63
End: 2018-03-28

## 2018-03-28 NOTE — TELEPHONE ENCOUNTER
Spoke to Dr. Marrufo. I explained that the patient should have been taking 3 caps of the Rytary three times per day but was only taking 1 cap per day since Saturday. She started him back on the 3 caps three times per day yesterday, so far he has had 4 doses now. They have not restarted his ropinirole yet and are wondering if starting him on a lower dose and titrate up would be better than going right to the 4 mg dose. She will ask him how long it has been since he stopped taking the ropinirole.    Main question: At what point should they change his Rytary or Parkinson's disease medications?    I suggested they have occupational therapy get a cognitive assessment as his confusion seems to be increasing over the past months. Also suggested she have social work get involved to help determine if it is safe for him to live alone.    I told her I would ask Sheree Neumann to contact her directly with recommendations regarding restarting the ropinirole.    carbidopa-levodopa ER (RYTARY) 36. MG CPCR per CR capsule 270 capsule 11 3/1/2018  --   Sig: Take 3 capsules by mouth 3 times daily     Ropinirole HCl (REQUIP XL) 4 MG TB24 90 tablet 3 2018  No   Si tab by mouth at 8-9pm

## 2018-03-28 NOTE — TELEPHONE ENCOUNTER
A Dr. Cinthia Marrufo, a Family Medicine Resident from [facility] called because she was admitting patient (yesterday-3/27) and she has some questions about the patient's Parkinson's medication regimen and she wants to touch base, she can be reached on her cell phone at [phone]

## 2018-03-28 NOTE — TELEPHONE ENCOUNTER
Called Dr. Ariza back and told her Dr. Villasenor recommends that they use 2 mg ropinirole at bedtime for now and see how the Rytary is working before adjust the ropinirole upwards. Also told her to watch for his slowness or dyskinesia to decide how to alter his Rytary. She verbalized understanding and thanked me for the call.

## 2018-04-02 ENCOUNTER — TELEPHONE (OUTPATIENT)
Dept: NEUROLOGY | Facility: CLINIC | Age: 63
End: 2018-04-02

## 2018-04-02 NOTE — TELEPHONE ENCOUNTER
Voice Message received on 3/30-   Dr. Marrufo called to give Jeaneth Abdullahi update on patient, she says that patient has started on 2 mg ropinirole at bedtime and Social work and Physical therapy assessed patient and recommended stay at [facility] (didn't specify where) and he will discharge there when they can accept him. She said you can call her with any questions @ [phone] and Patient should be called and reminded of when next scheduled appt with Dr. Villasenor is.

## 2018-04-04 NOTE — TELEPHONE ENCOUNTER
Pt called back on 04/04/18 at 4:07pm. Pt of Dr Villasenor. Pt said his eye sight and his head gets a little fuzzy when he uses Ropinirole HCl (REQUIP XL) 4 MG TB24. Pt said it is benefiting him some, but wants to know if he should stay on due to the side effects? Pt wants you to know that he is heading home today from Rehab. Pt is still having trouble sleeping. Pt is still having urinary issues. Pt was put on Flomax for urinary issues. Pt wants Dr Villasenor to know that and see if he should stay on Flomax? Pt always wants to know if Dr Villasenor can put in Rx Orders for a lift chair, a comnode, a walker, and a bed. Pt needs this is witting that these are needed for his care for his insurance to cover them. Pt can be reached at [phone]. Please return his call.

## 2018-04-04 NOTE — TELEPHONE ENCOUNTER
Edison Villasenor MD   You 7 minutes ago (1:00 PM)                 It is possible given his disease progression he is more subtle to effects of medications.   He also has an eye disorder   Can stay the course of if he really wants to cut back on requip xl he could go to 2mg per day   The bladder medications can also have side effects   So either way is fine (Routing comment)

## 2018-04-04 NOTE — TELEPHONE ENCOUNTER
"There is a 6% reported incidence of abnormal vision with immediate-release ropinirole. Insomnia was reported in 2% of patients. Headaches were reported in 6-8% of patients. - Per micromedex.    It seems unlikely that ropinirole is causing eye problems/\"fuzzy\" head since he has been on it for at least a few years.    Tamsulosin seems reasonable for his urinary issues but we need to give it more time to assess its effectiveness.    I would not recommend any changes since he was just discharged and has been through a lot of stress. Dr. Villasenor - any other thoughts?    Sheree Neumann, Pharm.D.  Medication Therapy Management Resident  Phone: [phone]  Pager: [phone]  "

## 2018-04-05 NOTE — TELEPHONE ENCOUNTER
Attempted to reach patient to discuss these various issues. Left him a voicemail to page me on 4/6/18 or 4/9/18 if he'd like medication changes to be made before I am back in clinic on 4/10/18.     Sheree Neumann, Pharm.D.  Medication Therapy Management Resident  Phone: [phone]  Pager: [phone]

## 2018-04-17 ENCOUNTER — TELEPHONE (OUTPATIENT)
Dept: PHARMACY | Facility: CLINIC | Age: 63
End: 2018-04-17

## 2018-04-17 NOTE — TELEPHONE ENCOUNTER
"Patient called to report that he's \"dead tired.\" This has been ongoing since his hospitalization, but he feels it may have started before the hospitalization. He is taking Rytary 36. mg 3 capsules three times/day (9 am, 3 pm, and 9 pm) and ropinirole XL 4 mg nightly. He has been noticing some leg tremor but otherwise feels Rytary has been more consistent in treating his PD symptoms. He states he has been sleeping well through the night and appreciates not having to take medications during the night.    He has had \"some hallucinations\" recently but reports this is \"not like Artane.\" He did not elaborate but states he will continue to monitor and will let us know if this gets worse.     He reports that he started taking 3 saw palmetto capsules/day upon recommendation from a relative, for his prostate. He does not know the dose. Medication added to his med list in Epic.    Patient instructed to continue to rest and listen to his body as he is potentially recovering from deconditioning in the hospital. He will check in with us later in the week.    Sheree Neumann, Pharm.D.  Medication Therapy Management Resident  Phone: [phone]  Pager: [phone]  "

## 2018-04-24 ENCOUNTER — TELEPHONE (OUTPATIENT)
Dept: PHARMACY | Facility: CLINIC | Age: 63
End: 2018-04-24

## 2018-04-24 RX ORDER — TAMSULOSIN HYDROCHLORIDE 0.4 MG/1
0.4 CAPSULE ORAL DAILY
COMMUNITY
End: 2018-05-22

## 2018-04-24 NOTE — TELEPHONE ENCOUNTER
Patient left voicemail and would like to discuss medication refills with [facility] pharmacist. Attempted to reach patient - left voicemail for him to call me back.    Sheree Neumann, Pharm.D.  Medication Therapy Management Resident  Phone: [phone]  Pager: [phone]

## 2018-04-24 NOTE — TELEPHONE ENCOUNTER
"Patient reports that he is feeling somewhat better the last few days. He is still \"struggling with physical energy and mental energy.\" His fatigue did improve when he got out for a walk yesterday. He also finds that taking short naps during the day helps. He feels that Requip and Rytary is working well - he still has some episodes of \"shaking on the left side,\" which is typically brought on by cold or \"tension.\"    Patient states that he found a new mattress and it is has been allowing him to sleep better through the night. He is also looking at getting a new chair to sit in.     He mentioned that he started a prostate medication but was unsure of the name. [facility] pharmacist called [facility] and verified he is taking tamsulosin 0.4 mg daily - added to Epic med list today.    Sheree Neumann, Pharm.D.  Medication Therapy Management Resident  Phone: [phone]  Pager: [phone]  "

## 2018-05-15 ENCOUNTER — TELEPHONE (OUTPATIENT)
Dept: PHARMACY | Facility: CLINIC | Age: 63
End: 2018-05-15

## 2018-05-15 NOTE — PROGRESS NOTES
Diagnosis/Summary/Recommendations:    PATIENT: Eamon Whalen  62 year old male     : 1955    MERCEDES: May 22, 2018    Parkinson    rytary seems to be working in regards to tremor control except with stress  He has had some balance problems  He has leg tremors bilateral but more on the left than right side.     He came by blue ride - transportation issue    PSA elevation had two prostate biopsies and was negative  And is continued to be followed by urology Dr. Goldberg  He had a flow study. He has nocturia still.  He is on flomax which has not helped.  He is on saw palmetto 3 times per day - dose of this is unknown.   Seeing Dr. Goldberg urology and was started on a new medication but cannot recall the name of the medication but it is a blue round pill taken once daily     Thinks someone is looking over his shoulder.   He is worrying about his family - states he had to put his will and estate plan together  His daughter Marlin lives in [place] (and is 35) and states she will do a good job. She has two sons: Edwar and Kam    He has had freezing.   He continues to have balance problems  He tries to remain active in his home.     He had freezing when visiting his daughter  He has more freezing in small spaces.     Will be getting a new mattress  He has been sleeping on a box spring  He will try to get a bathroom on the main floor so that he does not have to do stairs.       Medications     9am 3pm 9pm     Aspirin 325mg 1       Carbidopa-levodopa ER rytary 145mg 3 3 3     Fluocinolone acetonide derma-smoothe/FS Body 0.01% oil        Ketoconazole nizoral 2% cream        Ketoconazole nizoral 2% shampoo        Latanoprost xalatan 0.005% opthalmic solution   Both eyes     Ropinirole requip XL 4mg TB24   1     Saw palmetto sernoa repens 1 1 1     Tamsulosin flomax 0.4mg 1       Timolol timoptic 0.5% ophthalmic solution Both eyes                       Bladder medication once daily blue round pill                                     History obtained from patient     He does not have clear wearing off from the rytary  He has some fogginess.       PLAN  Dermatology consultation in [place] or [place] - may need light therapy or something as his skin problems continue    Seeing Sheree Neumann today.  May need confirmation on his bladder medication from [facility]  [phone]      Consider med rytary adjustment, etc.     Return 6 months.       Coding statement:   Duration of  Services: patient care and care coordination was 25 minutes  Greater than 50% of this visit was spent in counseling and coordination of care.     Edison Villasenor MD     ______________________________________    Last visit date and details:      PATIENT: Eamon Whalen  62 year old male      : 1955     MERCEDES: 2018     Parkinson  He has shuffling and freezing of gait - primarily on initiating his gait.      emg study results are probably artifactual as he does not appear to have clinical features that support the electrical findings.      Biopsy of his prostate pending tomorrow     He is urinating during the night.   Last night he slept through the night.   He wants to get a rocker and can sleep with th is.   His mattress - he is trying to return as he has nocturnal akinesia that is affecting his mobility     He goes to bed at 1030pm  Wakes up at 2-3am to  Urinate and has problems going back to sleep   He shuffles during the night.   He was unable to get comfortable and may not be able to fall back asleep  Before he was waking up at 630/7am  Now he is waking up at 530am.   If he is uncomfortable he gets up and gets out the bed and falls asleep in a chair in the kitchen if he can fall asleep but is not rested with this position or sleeps on a  but it is too small to sleep on.      His 7am dose - not necessary having clear off or on periods but when asked he has periods where he is shuffling and times when he is moving well but cannot  designate the times.      He has not been on rytary            Medications     7am 11am 4pm 8-9pm 1030pm 2-3am   Acetaminophen tylenol 325mg Not taking             Albuterol sulfate inhaler Not taking             Aspirin 325mg Not this week             Carbidopa/levodopa 50/200 CR Sinemet       1       Carbidopa/levodopa 25/100 Sinemet 2 2 2 1 Goes to bed 1   Dorzolamide timolol   Yellow cap ?correct medication?  1 drop both eyes             Ibuprofen 200mg As needed             Ketoconazole cream               Ketoconazole shampoo               Latanoprost xalatan ophthalmic solution  ?correct?       1 drop both eyes evening       nonformulary avastin Stopped for now             Pimavanserin nuplazid 17mg Not taking             Quetiapine seroquel 25mg Not taking             Ranitidine zantac 150mg Not taking              Ropinirole Requip XL 4mg       1          History obtained from patient      PLAN  Seborrhea -   ketoconazole shampoo   ketoconazole cream   Derma smoothe use once per week     Getting a prostate biopsy by Dr. Goldberg tomorrow as his psa was high. He had a mri  He has had a psa of 10. He is off aspirin.     He has fatigue  Not sure if he has had cbc, sed rate, crp, thyroid, paraneoplastic panel, metabolic screen, vitamin b12/mma and folate as we dont have access to records. May want to review with his pcp     His emg is abnormal but this is probably artifactual.      He has a new mattress  Suspect that he has  Nocturnal akinesia and that with the night time dose of sinemet this may help.   He will add a night time sinemet to see if helps his mobility through the night and when he wakes up in the morning.      Remains off the antipsychotics     He came by blue ride - transportation issue     Consider a visit with Sheree Neumann, Pharm D to discuss medication options besides sinemet above and down the road consider rytary to replace his sinemet regimen if needed.      Return visit in 6 months.       Coding statement:   Duration of  Services: patient care and care coordination was 25 minutes  Greater than 50% of this visit was spent in counseling and coordination of care.      Edison Villasenor MD       Eamon Whalen is a 62 year old male coming in for an initial visit for Medication Therapy Management.  He was referred to me from Dr. Villasenor.      Chief Complaint: Initial MTM visit     Allergies/ADRs: Reviewed in Epic  Tobacco: cigar occasionally  Alcohol: 1-3 beverages / week  Caffeine: 1 cups/day of coffee  Activity: minimal, concerned about walking outside d/t the ice; would like to get a bike  PMH: Reviewed in Epic     Medication Adherence/Access  The patient misses their medication 0 times per week.   Patient is responsible for his/her own medications.   Adherence/Compliance is described as excellent.  Medication barriers: no issues reported by patient.     Of note, patient is having a prostate biopsy tomorrow, so he is currently holding his daily aspirin.     Parkinson's Disease:  Current medications include: Levodopa-carbidopa 100-25 mg IR 2 tablets three times daily (7 am, 11 am, and 4 pm) and he takes one IR tablet at 8-9 pm. He also takes one ER levodopa-carbidopa 200-50 mg tablet at 8-9 pm. Patient is also taking ropinirole XL 4 mg at bedtime (8-9 pm). Pt reports that symptoms of PD are worsened. Current PD symptoms include: Shuffling, Freezing, Rigidity and Akinesia/bradykinesia. Patient is having nocturnal akinesia with a new mattress he purchased and always wakes up at 2-3 am but has difficulty going back to sleep. Nuplazid and quetiapine have been prescribed in the past but he has never taken them and denies hallucinations.     Glaucoma: Current medications include timolol (strength unknown) one drop in each eye in the morning and latanoprost 0.005% one drop in each eye in the evening. Patient reports that he has been told his eye pressure has improved since being on these drops.      Seborrhea:  Patient is not currently taking any medications but has used a tar shampoo in the past and a Xuan topical product in the past.         Current labs include:      BP Readings from Last 3 Encounters:   02/20/18 (!) 168/100   11/21/17 141/78   03/23/17 144/83              Most Recent Immunizations   Administered Date(s) Administered     FLU 6-35 months 01/13/2011     Meningococcal (Menomune ) 10/01/2014     Twinrix A/B 10/01/2014     Typhoid IM 10/01/2014     Yellow Fever 01/13/2011         ASSESSMENT:          Current medications were reviewed today.      Medication Adherence: no issues identified     Parkinson's Disease: Needs improvement. As discussed with Dr. Villasenor, patient may benefit form either an overnight dose of carbidopa-levodopa or a longer-acting carbdiopa-levodopa that would last overnight (Rytary 36 mg 3 caps TID). Could also consider a CR dosage of carbidopa-levodopa if Rytary is not covered by his insurance.     Glaucoma: Appears stable.     Seborrhea: Needs improvement. Dr. Villasenor ordered topical products today for patient to try.     Elevated BP: Needs improvement. Unable to obtain outside records with current BP measurements. Recommend patient follow up with PCP and if BP elevated at next neurology clinic visit would recommend initiation of antihypertensive medication.     PLAN:         1. Patient instructed to start taking an overnight Sinemet dose around 2-3 am.   2. Patient given rx's for ketoconazole shampoo and cream and MTM pharmacist provided some education on these products.  3. MTM pharmacist to check insurance coverage of Rytary: Rytary is not on the [employer] [place] formulary. Consider pursing PA/appeal if the overnight Sinemet dose is not effective for him.     I spent 30 minutes with this patient today. I offer these suggestions for consideration by the movement disorders specialist. A copy of the visit note was provided to the movement disorders specialist.     Will follow up in 3  months (co-visit with Dr. Villasenor): 5/22/18     I concur with the note as dictated above which reflects our joint assessment and plan.   Hannah Melo PharmD     The patient was given a summary of these recommendations as an after visit summary.      Sheree Neumann, Pharm.D.  Medication Therapy Management Resident  Phone: [phone]  Pager: [phone]      ______________________________________      Patient was asked about 14 Review of systems including changes in vision (dry eyes, double vision), hearing, heart, lungs, musculoskeletal, depression, anxiety, snoring, RBD, insomnia, urinary frequency, urinary urgency, constipation, swallowing problems, hematological, ID, allergies, skin problems: seborrhea, endocrinological: thyroid, diabetes, cholesterol; balance, weight changes, and other neurological problems and these were not significant at this time except for   Patient Active Problem List   Diagnosis     Paralysis agitans (H)     Wears glasses     Balance problems     Constipation     GERD (gastroesophageal reflux disease)     Urinary urgency     Insomnia     Seborrhea     Lumbago     Hearing loss     Family history of Parkinson's disease     Central retinal vein occlusion     Choroidal nevus     Vision problem -- Right Eye     ED (erectile dysfunction)     Other specified glaucoma     Abnormal electrocardiogram     Bifascicular block     Prolonged Q-T interval on ECG     History of EMG     Encounter for examination for driving license     Parkinson disease (H)     Elevated prostate specific antigen (PSA)          Allergies   Allergen Reactions     Cats      Dogs      Past Surgical History:   Procedure Laterality Date     NO HISTORY OF SURGERY       Past Medical History:   Diagnosis Date     Abnormal electrocardiogram 3/23/2017     Balance problems      Bifascicular block 3/23/2017     Central retinal vein occlusion 4/11/2014     Choroidal nevus 4/11/2014     Constipation      Elevated prostate specific antigen  (PSA) 2018     Family history of Parkinson's disease      GERD (gastroesophageal reflux disease)      Glaucoma 2015     Hearing loss      History of EMG 2018    [facility]  Nerve Conduction & EMG Report  Patient:       Eamon Whalen Patient ID:    [id number] Gender:        Male YOB: 1955 Age:           62 Years 2 Months    History & Examination:  62 year old man with history of Parkinson's disease, restless leg syndrome, and paresthesias in feet and legs. Query polyneuropathy. His clinical examination      Lumbago      Parkinson's disease (H)      Prolonged Q-T interval on ECG 3/23/2017     Urinary urgency      Wears glasses      Social History     Social History     Marital status: Single     Spouse name: N/A     Number of children: N/A     Years of education: N/A     Occupational History     farm Self Employed.     Social History Main Topics     Smoking status: Never Smoker     Smokeless tobacco: Never Used      Comment: cigar sometimes     Alcohol use Yes      Comment: occ     Drug use: No     Sexual activity: Yes     Partners: Female     Other Topics Concern     Not on file     Social History Narrative    1 daughter who is  and lives in [place]    She was pregnant with the 1st child and had some problems in the 6-7 month of pregnancy with     Baby that may have hydrocephalus as there was IUGR. The baby  after 3 months in 2011. She was encouraged her to undergo genetic testing which was negative and they are considering having more children.     He met a woman and may  a woman from [place] - who has [place] roots. 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 He met her 3 times. Her name is Tavia and lives in [facility]. They will be visiting within a day or so with her mother. She is 36 yrs old. She is not going back there.     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 Depending on the status of the main house may build new house.         [place] in his home town had parkinson    Germ[place] lived on the other side of town        [facility] -- [place] of [place] 12 miles.         This was way before pesticides        Maternal grandfather     Dax Sorenson - [place] name     He would have to be brought out to a rocking chair    \"hardening of there arteries.         mother       Drug and lactation database from the [place] [employer] of Medicine:  http://toxnet.nlm.nih.gov/cgi-bin/sis/htmlgen?LACT      B/P: Data Unavailable, T: Data Unavailable, P: Data Unavailable, R: Data Unavailable 0 lbs 0 oz  There were no vitals taken for this visit., There is no height or weight on file to calculate BMI.  Medications and Vitals not listed above were documented in the cart and reviewed by me.     Current Outpatient Prescriptions   Medication Sig Dispense Refill     aspirin 325 MG tablet Take 1 tablet (325 mg) by mouth daily 180 tablet      carbidopa-levodopa ER (RYTARY) 36. MG CPCR per CR capsule Take 3 capsules by mouth 3 times daily 270 capsule 11     fluocinolone acetonide (DERMA-SMOOTHE/FS BODY) 0.01 % oil Wet hair and apply to scalp and leave in for 4 hours and then wash off. May use 1 times per week 2 Bottle 11     ketoconazole (NIZORAL) 2 % cream Apply topically daily Apply topical daily 60 g 11     ketoconazole (NIZORAL) 2 % shampoo Apply to the affected area and wash off after 5 minutes. 120 mL 11     latanoprost (XALATAN) 0.005 % ophthalmic solution One drop in both eyes in the evening 2.5 mL 1     Ropinirole HCl (REQUIP XL) 4 MG TB24 1 tab by mouth at 8-9pm 90 tablet 3     SAW PALMETTO, SERENOA REPENS, PO Take 3 capsules by mouth daily       tamsulosin (FLOMAX) 0.4 MG capsule Take 0.4 mg by mouth daily       timolol (TIMOPTIC) 0.5 % ophthalmic solution Place 1 drop into both eyes daily           Edison Villasenor " MD

## 2018-05-15 NOTE — TELEPHONE ENCOUNTER
Patient is requesting verification of his appointments on 5/22/18. Spoke with patient and informed him of his upcoming appointments with Dr. Villasenor and MTM on 5/22/18.    Sheree Neumann, Pharm.D.  Medication Therapy Management Resident  Phone: [phone]  Pager: [phone]

## 2018-05-17 ENCOUNTER — TELEPHONE (OUTPATIENT)
Dept: NEUROLOGY | Facility: CLINIC | Age: 63
End: 2018-05-17

## 2018-05-17 NOTE — TELEPHONE ENCOUNTER
Left voice mail reminding patient about his upcoming appointment with Dr. Villasenor on 5/22 at 12:10. Asked him to bring a list of his questions/concerns and one or two goals that he would like to address since Dr. Villasenor will have only 30 minutes to spend with him.

## 2018-05-22 ENCOUNTER — OFFICE VISIT (OUTPATIENT)
Dept: PHARMACY | Facility: CLINIC | Age: 63
End: 2018-05-22
Payer: COMMERCIAL

## 2018-05-22 ENCOUNTER — OFFICE VISIT (OUTPATIENT)
Dept: NEUROLOGY | Facility: CLINIC | Age: 63
End: 2018-05-22
Payer: COMMERCIAL

## 2018-05-22 VITALS — BODY MASS INDEX: 22.89 KG/M2 | SYSTOLIC BLOOD PRESSURE: 132 MMHG | DIASTOLIC BLOOD PRESSURE: 81 MMHG | WEIGHT: 155 LBS

## 2018-05-22 DIAGNOSIS — Z79.82 LONG-TERM USE OF ASPIRIN THERAPY: ICD-10-CM

## 2018-05-22 DIAGNOSIS — G20.A1 PARKINSON'S DISEASE (H): Primary | ICD-10-CM

## 2018-05-22 DIAGNOSIS — G20.A1 PARALYSIS AGITANS (H): Primary | ICD-10-CM

## 2018-05-22 DIAGNOSIS — G20.A1 PARALYSIS AGITANS (H): ICD-10-CM

## 2018-05-22 DIAGNOSIS — L21.9 SEBORRHEA: ICD-10-CM

## 2018-05-22 DIAGNOSIS — N40.0 BENIGN PROSTATIC HYPERPLASIA, UNSPECIFIED WHETHER LOWER URINARY TRACT SYMPTOMS PRESENT: ICD-10-CM

## 2018-05-22 DIAGNOSIS — G25.81 RESTLESS LEG SYNDROME: ICD-10-CM

## 2018-05-22 PROBLEM — Z92.89 PERSONAL HISTORY OF OTHER MEDICAL TREATMENT (CODE): Status: ACTIVE | Noted: 2018-02-20

## 2018-05-22 PROBLEM — R53.1 WEAKNESS: Status: ACTIVE | Noted: 2018-03-27

## 2018-05-22 PROCEDURE — 99607 MTMS BY PHARM ADDL 15 MIN: CPT | Performed by: PHARMACIST

## 2018-05-22 PROCEDURE — 99606 MTMS BY PHARM EST 15 MIN: CPT | Performed by: PHARMACIST

## 2018-05-22 RX ORDER — ASPIRIN 325 MG
TABLET ORAL
Qty: 180 TABLET | COMMUNITY
Start: 2018-05-22 | End: 2018-05-22 | Stop reason: DRUGHIGH

## 2018-05-22 RX ORDER — TAMSULOSIN HYDROCHLORIDE 0.4 MG/1
CAPSULE ORAL
Qty: 90 CAPSULE | Refills: 3 | COMMUNITY
Start: 2018-05-22 | End: 2020-10-27

## 2018-05-22 RX ORDER — FINASTERIDE 5 MG/1
5 TABLET, FILM COATED ORAL DAILY
COMMUNITY
End: 2018-11-08

## 2018-05-22 RX ORDER — LATANOPROST 50 UG/ML
SOLUTION/ DROPS OPHTHALMIC
Qty: 2.5 ML | Refills: 1 | COMMUNITY
Start: 2018-05-22

## 2018-05-22 RX ORDER — ROPINIROLE 4 MG/1
TABLET, FILM COATED, EXTENDED RELEASE ORAL
Qty: 90 TABLET | Refills: 3 | Status: SHIPPED | OUTPATIENT
Start: 2018-05-22 | End: 2018-08-21 | Stop reason: DRUGHIGH

## 2018-05-22 RX ORDER — KETOCONAZOLE 20 MG/G
CREAM TOPICAL DAILY
Qty: 60 G | Refills: 11 | Status: SHIPPED | OUTPATIENT
Start: 2018-05-22 | End: 2018-08-21

## 2018-05-22 RX ORDER — KETOCONAZOLE 20 MG/ML
SHAMPOO TOPICAL
Qty: 120 ML | Refills: 11 | Status: SHIPPED | OUTPATIENT
Start: 2018-05-22 | End: 2018-08-21

## 2018-05-22 RX ORDER — TIMOLOL MALEATE 5 MG/ML
SOLUTION/ DROPS OPHTHALMIC
Qty: 1 BOTTLE | Refills: 11 | COMMUNITY
Start: 2018-05-22 | End: 2020-10-27

## 2018-05-22 RX ORDER — FLUOCINOLONE ACETONIDE 0.11 MG/ML
OIL TOPICAL
Qty: 2 BOTTLE | Refills: 11 | Status: SHIPPED | OUTPATIENT
Start: 2018-05-22 | End: 2018-08-21

## 2018-05-22 ASSESSMENT — PAIN SCALES - GENERAL: PAINLEVEL: NO PAIN (0)

## 2018-05-22 NOTE — PATIENT INSTRUCTIONS
Recommendations from today's MTM visit:                                                      1. Finish the dose of Rytary you have right now. Then, increase Rytary to the 195 mg capsules (yellow-blue pills). You will take 3 capsules 3 times per day.    2. Keep ropinirole at 4 mg/day.    3. Follow up with Dr. Goldberg - talk with him about tamsulosin (brown pill) since it doesn't seem to be working. Finasteride (blue tablet) takes some time to work.    4. Make sure to see a dermatologist.    5. Decrease your aspirin to 81 mg per day.     Next MTM visit: give me a call in 2-4 weeks    To schedule another MTM appointment, please call the clinic directly or you may call the MTM scheduling line at [phone] or toll-free at [phone].     My Clinical Pharmacist's contact information:                                                      It was a pleasure seeing you today!  Please feel free to contact me with any questions or concerns you have.      Sheree Neumann, Pharm.D.  Medication Therapy Management Resident  Phone: [phone]  Pager: [phone]    You may receive a survey about the MTM services you received.  I would appreciate your feedback to help me serve you better in the future. Please fill it out and return it when you can. Your comments will be anonymous.

## 2018-05-22 NOTE — LETTER
2018      RE: Eamon Whalen  [address]  [place] [address]       Diagnosis/Summary/Recommendations:    PATIENT: Eamon Whalen  62 year old male     : 1955    MERCEDES: May 22, 2018    Parkinson    rytary seems to be working in regards to tremor control except with stress  He has had some balance problems  He has leg tremors bilateral but more on the left than right side.     He came by blue ride - transportation issue    PSA elevation had two prostate biopsies and was negative  And is continued to be followed by urology Dr. Goldberg  He had a flow study. He has nocturia still.  He is on flomax which has not helped.  He is on saw palmetto 3 times per day - dose of this is unknown.   Seeing Dr. Goldberg urology and was started on a new medication but cannot recall the name of the medication but it is a blue round pill taken once daily     Thinks someone is looking over his shoulder.   He is worrying about his family - states he had to put his will and estate plan together  His daughter Marlin lives in [place] (and is 35) and states she will do a good job. She has two sons: Edwar and Kam    He has had freezing.   He continues to have balance problems  He tries to remain active in his home.     He had freezing when visiting his daughter  He has more freezing in small spaces.     Will be getting a new mattress  He has been sleeping on a box spring  He will try to get a bathroom on the main floor so that he does not have to do stairs.       Medications     9am 3pm 9pm     Aspirin 325mg 1       Carbidopa-levodopa ER rytary 145mg 3 3 3     Fluocinolone acetonide derma-smoothe/FS Body 0.01% oil        Ketoconazole nizoral 2% cream        Ketoconazole nizoral 2% shampoo        Latanoprost xalatan 0.005% opthalmic solution   Both eyes     Ropinirole requip XL 4mg TB24   1     Saw palmetto sernoa repens 1 1 1     Tamsulosin flomax 0.4mg 1       Timolol timoptic 0.5% ophthalmic solution Both eyes                        Bladder medication once daily blue round pill                                    History obtained from patient     He does not have clear wearing off from the rytary  He has some fogginess.       PLAN  Dermatology consultation in [place] or [place] - may need light therapy or something as his skin problems continue    Seeing Sheree Neumann today.  May need confirmation on his bladder medication from [facility] pharmacy  [phone]      Consider med rytary adjustment, etc.     Return 6 months.       Coding statement:   Duration of  Services: patient care and care coordination was 25 minutes  Greater than 50% of this visit was spent in counseling and coordination of care.     Edison Villasenor MD     ______________________________________    Last visit date and details:      PATIENT: Eamon Whalen  62 year old male      : 1955     MERCEDES: 2018     Parkinson  He has shuffling and freezing of gait - primarily on initiating his gait.      emg study results are probably artifactual as he does not appear to have clinical features that support the electrical findings.      Biopsy of his prostate pending tomorrow     He is urinating during the night.   Last night he slept through the night.   He wants to get a rocker and can sleep with th is.   His mattress - he is trying to return as he has nocturnal akinesia that is affecting his mobility     He goes to bed at 1030pm  Wakes up at 2-3am to  Urinate and has problems going back to sleep   He shuffles during the night.   He was unable to get comfortable and may not be able to fall back asleep  Before he was waking up at 630/7am  Now he is waking up at 530am.   If he is uncomfortable he gets up and gets out the bed and falls asleep in a chair in the kitchen if he can fall asleep but is not rested with this position or sleeps on a  but it is too small to sleep on.      His 7am dose - not necessary having clear off or on periods but when  asked he has periods where he is shuffling and times when he is moving well but cannot designate the times.      He has not been on rytary            Medications     7am 11am 4pm 8-9pm 1030pm 2-3am   Acetaminophen tylenol 325mg Not taking             Albuterol sulfate inhaler Not taking             Aspirin 325mg Not this week             Carbidopa/levodopa 50/200 CR Sinemet       1       Carbidopa/levodopa 25/100 Sinemet 2 2 2 1 Goes to bed 1   Dorzolamide timolol   Yellow cap ?correct medication?  1 drop both eyes             Ibuprofen 200mg As needed             Ketoconazole cream               Ketoconazole shampoo               Latanoprost xalatan ophthalmic solution  ?correct?       1 drop both eyes evening       nonformulary avastin Stopped for now             Pimavanserin nuplazid 17mg Not taking             Quetiapine seroquel 25mg Not taking             Ranitidine zantac 150mg Not taking              Ropinirole Requip XL 4mg       1          History obtained from patient      PLAN  Seborrhea -   ketoconazole shampoo   ketoconazole cream   Derma smoothe use once per week     Getting a prostate biopsy by Dr. Goldberg tomorrow as his psa was high. He had a mri  He has had a psa of 10. He is off aspirin.     He has fatigue  Not sure if he has had cbc, sed rate, crp, thyroid, paraneoplastic panel, metabolic screen, vitamin b12/mma and folate as we dont have access to records. May want to review with his pcp     His emg is abnormal but this is probably artifactual.      He has a new mattress  Suspect that he has  Nocturnal akinesia and that with the night time dose of sinemet this may help.   He will add a night time sinemet to see if helps his mobility through the night and when he wakes up in the morning.      Remains off the antipsychotics     He came by [employer] ride - transportation issue     Consider a visit with Sheree Neumann, Pharm D to discuss medication options besides sinemet above and down the road consider  rytary to replace his sinemet regimen if needed.      Return visit in 6 months.      Coding statement:   Duration of  Services: patient care and care coordination was 25 minutes  Greater than 50% of this visit was spent in counseling and coordination of care.      Edison Villasenor MD       Eamon Whalen is a 62 year old male coming in for an initial visit for Medication Therapy Management.  He was referred to me from Dr. Villasenor.      Chief Complaint: Initial MTM visit     Allergies/ADRs: Reviewed in Epic  Tobacco: cigar occasionally  Alcohol: 1-3 beverages / week  Caffeine: 1 cups/day of coffee  Activity: minimal, concerned about walking outside d/t the ice; would like to get a bike  PMH: Reviewed in Epic     Medication Adherence/Access  The patient misses their medication 0 times per week.   Patient is responsible for his/her own medications.   Adherence/Compliance is described as excellent.  Medication barriers: no issues reported by patient.     Of note, patient is having a prostate biopsy tomorrow, so he is currently holding his daily aspirin.     Parkinson's Disease:  Current medications include: Levodopa-carbidopa 100-25 mg IR 2 tablets three times daily (7 am, 11 am, and 4 pm) and he takes one IR tablet at 8-9 pm. He also takes one ER levodopa-carbidopa 200-50 mg tablet at 8-9 pm. Patient is also taking ropinirole XL 4 mg at bedtime (8-9 pm). Pt reports that symptoms of PD are worsened. Current PD symptoms include: Shuffling, Freezing, Rigidity and Akinesia/bradykinesia. Patient is having nocturnal akinesia with a new mattress he purchased and always wakes up at 2-3 am but has difficulty going back to sleep. Nuplazid and quetiapine have been prescribed in the past but he has never taken them and denies hallucinations.     Glaucoma: Current medications include timolol (strength unknown) one drop in each eye in the morning and latanoprost 0.005% one drop in each eye in the evening. Patient reports that he has been told  his eye pressure has improved since being on these drops.      Seborrhea: Patient is not currently taking any medications but has used a tar shampoo in the past and a Xuan topical product in the past.         Current labs include:      BP Readings from Last 3 Encounters:   02/20/18 (!) 168/100   11/21/17 141/78   03/23/17 144/83              Most Recent Immunizations   Administered Date(s) Administered     FLU 6-35 months 01/13/2011     Meningococcal (Menomune ) 10/01/2014     Twinrix A/B 10/01/2014     Typhoid IM 10/01/2014     Yellow Fever 01/13/2011         ASSESSMENT:          Current medications were reviewed today.      Medication Adherence: no issues identified     Parkinson's Disease: Needs improvement. As discussed with Dr. Villasenor, patient may benefit form either an overnight dose of carbidopa-levodopa or a longer-acting carbdiopa-levodopa that would last overnight (Rytary 36 mg 3 caps TID). Could also consider a CR dosage of carbidopa-levodopa if Rytary is not covered by his insurance.     Glaucoma: Appears stable.     Seborrhea: Needs improvement. Dr. Villasenor ordered topical products today for patient to try.     Elevated BP: Needs improvement. Unable to obtain outside records with current BP measurements. Recommend patient follow up with PCP and if BP elevated at next neurology clinic visit would recommend initiation of antihypertensive medication.     PLAN:         1. Patient instructed to start taking an overnight Sinemet dose around 2-3 am.   2. Patient given rx's for ketoconazole shampoo and cream and [facility] pharmacist provided some education on these products.  3. [facility] pharmacist to check insurance coverage of Rytary: Rytary is not on the [employer] [place] formulary. Consider pursing PA/appeal if the overnight Sinemet dose is not effective for him.     I spent 30 minutes with this patient today. I offer these suggestions for consideration by the movement disorders specialist. A copy of the visit note was  provided to the movement disorders specialist.     Will follow up in 3 months (co-visit with Dr. Villasenor): 5/22/18     I concur with the note as dictated above which reflects our joint assessment and plan.   Hannah Melo, [place]     The patient was given a summary of these recommendations as an after visit summary.      Sheree Neumann, Pharm.D.  Medication Therapy Management Resident  Phone: [phone]  Pager: [phone]      ______________________________________      Patient was asked about 14 Review of systems including changes in vision (dry eyes, double vision), hearing, heart, lungs, musculoskeletal, depression, anxiety, snoring, RBD, insomnia, urinary frequency, urinary urgency, constipation, swallowing problems, hematological, ID, allergies, skin problems: seborrhea, endocrinological: thyroid, diabetes, cholesterol; balance, weight changes, and other neurological problems and these were not significant at this time except for   Patient Active Problem List   Diagnosis     Paralysis agitans (H)     Wears glasses     Balance problems     Constipation     GERD (gastroesophageal reflux disease)     Urinary urgency     Insomnia     Seborrhea     Lumbago     Hearing loss     Family history of Parkinson's disease     Central retinal vein occlusion     Choroidal nevus     Vision problem -- Right Eye     ED (erectile dysfunction)     Other specified glaucoma     Abnormal electrocardiogram     Bifascicular block     Prolonged Q-T interval on ECG     History of EMG     Encounter for examination for driving license     Parkinson disease (H)     Elevated prostate specific antigen (PSA)          Allergies   Allergen Reactions     Cats      Dogs      Past Surgical History:   Procedure Laterality Date     NO HISTORY OF SURGERY       Past Medical History:   Diagnosis Date     Abnormal electrocardiogram 3/23/2017     Balance problems      Bifascicular block 3/23/2017     Central retinal vein occlusion 4/11/2014     Choroidal  nevus 2014     Constipation      Elevated prostate specific antigen (PSA) 2018     Family history of Parkinson's disease      GERD (gastroesophageal reflux disease)      Glaucoma 2015     Hearing loss      History of EMG 2018    [facility]  Nerve Conduction & EMG Report  Patient:       Eamon Whalen Patient ID:    [id number] Gender:        Male YOB: 1955 Age:           62 Years 2 Months    History & Examination:  62 year old man with history of Parkinson's disease, restless leg syndrome, and paresthesias in feet and legs. Query polyneuropathy. His clinical examination      Lumbago      Parkinson's disease (H)      Prolonged Q-T interval on ECG 3/23/2017     Urinary urgency      Wears glasses      Social History     Social History     Marital status: Single     Spouse name: N/A     Number of children: N/A     Years of education: N/A     Occupational History     farm Self Employed.     Social History Main Topics     Smoking status: Never Smoker     Smokeless tobacco: Never Used      Comment: cigar sometimes     Alcohol use Yes      Comment: occ     Drug use: No     Sexual activity: Yes     Partners: Female     Other Topics Concern     Not on file     Social History Narrative    1 daughter who is  and lives in [place]    She was pregnant with the 1st child and had some problems in the 6-7 month of pregnancy with     Baby that may have hydrocephalus as there was IUGR. The baby  after 3 months in 2011. She was encouraged her to undergo genetic testing which was negative and they are considering having more children.     He met a woman and may  a woman from [facility] - who has [place] roots. 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 He met her 3 times. Her name is Tavia and lives in [facility]. They will be visiting within a day or so with her mother. She is 36 yrs old. She is not going back there.     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 Depending on the status of the main house may build new house.         [place] in his home town had parkinson    Germans lived on the other side of town        [facility] -- [place] of [place] by 12 miles.         This was way before pesticides        Maternal grandfather     Dax Sorenson - Sudanese name     He would have to be brought out to a rocking chair    \"hardening of there arteries.         mother       Drug and lactation database from the [place] [employer] of [employer]:  http://toxnet.nlm.nih.gov/cgi-bin/sis/htmlgen?LACT      B/P: Data Unavailable, T: Data Unavailable, P: Data Unavailable, R: Data Unavailable 0 lbs 0 oz  There were no vitals taken for this visit., There is no height or weight on file to calculate BMI.  Medications and Vitals not listed above were documented in the cart and reviewed by me.     Current Outpatient Prescriptions   Medication Sig Dispense Refill     aspirin 325 MG tablet Take 1 tablet (325 mg) by mouth daily 180 tablet      carbidopa-levodopa ER (RYTARY) 36. MG CPCR per CR capsule Take 3 capsules by mouth 3 times daily 270 capsule 11     fluocinolone acetonide (DERMA-SMOOTHE/FS BODY) 0.01 % oil Wet hair and apply to scalp and leave in for 4 hours and then wash off. May use 1 times per week 2 Bottle 11     ketoconazole (NIZORAL) 2 % cream Apply topically daily Apply topical daily 60 g 11     ketoconazole (NIZORAL) 2 % shampoo Apply to the affected area and wash off after 5 minutes. 120 mL 11     latanoprost (XALATAN) 0.005 % ophthalmic solution One drop in both eyes in the evening 2.5 mL 1     Ropinirole HCl (REQUIP XL) 4 MG TB24 1 tab by mouth at 8-9pm 90 tablet 3     SAW PALMMEDHAT, SERENOA REPENS, PO Take 3 capsules by mouth daily       tamsulosin (FLOMAX) 0.4 MG capsule Take 0.4 mg by mouth daily       timolol (TIMOPTIC) 0.5 % " ophthalmic solution Place 1 drop into both eyes daily           Edison Villasenor MD

## 2018-05-22 NOTE — NURSING NOTE
Chief Complaint   Patient presents with     RECHECK     follow up -movement disorder     Blood pressure 132/81, weight 70.3 kg (155 lb).    Олег Blue LPN

## 2018-05-22 NOTE — MR AVS SNAPSHOT
After Visit Summary   5/22/2018    Eamon Whalen    MRN: [id number]           Patient Information     Date Of Birth          1955        Visit Information        Provider Department      5/22/2018 1:00 PM Sheree Neumann [facility] Neurology [facility] [place]        Care Instructions    Recommendations from today's MTM visit:                                                      1. Finish the dose of Rytary you have right now. Then, increase Rytary to the 195 mg capsules (yellow-blue pills). You will take 3 capsules 3 times per day.    2. Keep ropinirole at 4 mg/day.    3. Follow up with Dr. Goldberg - talk with him about tamsulosin (brown pill) since it doesn't seem to be working. Finasteride (blue tablet) takes some time to work.    4. Make sure to see a dermatologist.    5. Decrease your aspirin to 81 mg per day.     Next MTM visit: give me a call in 2-4 weeks    To schedule another MTM appointment, please call the clinic directly or you may call the MTM scheduling line at [phone] or toll-free at [phone].     My Clinical Pharmacist's contact information:                                                      It was a pleasure seeing you today!  Please feel free to contact me with any questions or concerns you have.      Sheree Neumann, Pharm.D.  Medication Therapy Management Resident  Phone: [phone]  Pager: [phone]    You may receive a survey about the [facility] services you received.  I would appreciate your feedback to help me serve you better in the future. Please fill it out and return it when you can. Your comments will be anonymous.            Follow-ups after your visit        Who to contact     If you have questions or need follow up information about today's clinic visit or your schedule please contact [facility] NEUROLOGY [facility] MTM directly at [phone].  Normal or non-critical lab and imaging results will be communicated to you by MyChart, letter or phone within 4  business days after the clinic has received the results. If you do not hear from us within 7 days, please contact the clinic through edulio or phone. If you have a critical or abnormal lab result, we will notify you by phone as soon as possible.  Submit refill requests through edulio or call your pharmacy and they will forward the refill request to us. Please allow 3 business days for your refill to be completed.          Additional Information About Your Visit        edulio Information     edulio gives you secure access to your electronic health record. If you see a primary care provider, you can also send messages to your care team and make appointments. If you have questions, please call your primary care clinic.  If you do not have a primary care provider, please call [phone] and they will assist you.        Care EveryWhere ID     This is your Care EveryWhere ID. This could be used by other organizations to access your [place] medical records  [id number]         Blood Pressure from Last 3 Encounters:   05/22/18 132/81   02/20/18 (!) 168/100   11/21/17 141/78    Weight from Last 3 Encounters:   05/22/18 155 lb (70.3 kg)   02/20/18 152 lb 11.2 oz (69.3 kg)   11/21/17 145 lb (65.8 kg)              Today, you had the following     No orders found for display         Today's Medication Changes          These changes are accurate as of 5/22/18  1:15 PM.  If you have any questions, ask your nurse or doctor.               These medicines have changed or have updated prescriptions.        Dose/Directions    aspirin 81 MG EC tablet   This may have changed:  Another medication with the same name was removed. Continue taking this medication, and follow the directions you see here.   Changed by:  Edison Villasenor MD        Dose:  81 mg   Take 81 mg by mouth daily   Refills:  0       carbidopa-levodopa ER 36. MG Cpcr per CR capsule   Commonly known as:  RYTARY   This may have changed:    - how much to  take  - how to take this  - when to take this  - additional instructions   Used for:  Paralysis agitans (H)   Changed by:  Edison Villasenor MD        3 capsule by mouth 3 times per day @ 9am, 3pm and 9pm   Quantity:  810 capsule   Refills:  11       Ropinirole HCl 4 MG Tb24   Commonly known as:  REQUIP XL   This may have changed:  additional instructions   Used for:  Parkinson's disease (H), Restless leg syndrome   Changed by:  Edison Villasenor MD        1 tab by mouth nightly at 9pm   Quantity:  90 tablet   Refills:  3       saw palmetto 160 MG Caps   This may have changed:    - medication strength  - how much to take  - how to take this  - when to take this  - additional instructions   Changed by:  Edison Villasenor MD        Dose unknown - 1 capsule by mouth 3 times per day @ 9am, 3pm and 9pm   Refills:  0       tamsulosin 0.4 MG capsule   Commonly known as:  FLOMAX   This may have changed:    - how much to take  - how to take this  - when to take this  - additional instructions   Changed by:  Edison Villasenor MD        0.4mg tab by mouth daily @ 9am   Quantity:  90 capsule   Refills:  3       timolol 0.5 % ophthalmic solution   Commonly known as:  TIMOPTIC   This may have changed:    - how much to take  - how to take this  - when to take this  - additional instructions   Changed by:  Edison Villasenor MD        1 drop both eyes daily @ 9am   Quantity:  1 Bottle   Refills:  11       XALATAN 0.005 % ophthalmic solution   This may have changed:  additional instructions   Generic drug:  latanoprost   Changed by:  Edison Villasenor MD        One drop in both eyes @ 9pm   Quantity:  2.5 mL   Refills:  1            Where to get your medicines      These medications were sent to [id number] [place] - [address]  [address]) [place] [address]     Phone:  [phone]     carbidopa-levodopa ER 36. MG Cpcr per CR capsule    fluocinolone  acetonide 0.01 % oil    ketoconazole 2 % cream    ketoconazole 2 % shampoo    Ropinirole HCl 4 MG Tb24                Primary Care Provider Office Phone # Fax #    Bernardino John [phone]       [facility] [address]  [facility] [address]        Equal Access to Services     CORI MUÑOZ : Hadii aad ku hadasho Soomaali, waaxda luqadaha, qaybta kaalmada adeegyada, waxay idiin hayaan adeeg kharash la'aan ah. So [facility] [phone].    ATENCIÓN: Si habla español, tiene a olmstead disposición servicios gratuitos de asistencia lingüística. Llame al [phone].    We comply with applicable federal civil rights laws and [place] laws. We do not discriminate on the basis of race, color, national origin, age, disability, sex, sexual orientation, or gender identity.            Thank you!     Thank you for choosing [facility] NEUROLOGY CLINIC [facility]  for your care. Our goal is always to provide you with excellent care. Hearing back from our patients is one way we can continue to improve our services. Please take a few minutes to complete the written survey that you may receive in the mail after your visit with us. Thank you!             Your Updated Medication List - Protect others around you: Learn how to safely use, store and throw away your medicines at www.disposemymeds.org.          This list is accurate as of 5/22/18  1:15 PM.  Always use your most recent med list.                   Brand Name Dispense Instructions for use Diagnosis    aspirin 81 MG EC tablet      Take 81 mg by mouth daily        carbidopa-levodopa ER 36. MG Cpcr per CR capsule    RYTARY    810 capsule    3 capsule by mouth 3 times per day @ 9am, 3pm and 9pm    Paralysis agitans (H)       finasteride 5 MG tablet    PROSCAR     Take 5 mg by mouth daily        fluocinolone acetonide 0.01 % oil    DERMA-SMOOTHE/FS BODY    2 Bottle    Wet hair and apply to scalp and leave in for 4 hours and then wash off. May use 1 times per week     Seborrhea, Paralysis agitans (H)       * ketoconazole 2 % cream    NIZORAL    60 g    Apply topically daily Apply topical daily    Seborrhea, Paralysis agitans (H)       * ketoconazole 2 % shampoo    NIZORAL    120 mL    Apply to the affected area and wash off after 5 minutes.    Paralysis agitans (H), Seborrhea       Ropinirole HCl 4 MG Tb24    REQUIP XL    90 tablet    1 tab by mouth nightly at 9pm    Parkinson's disease (H), Restless leg syndrome       saw palmetto 160 MG Caps      Dose unknown - 1 capsule by mouth 3 times per day @ 9am, 3pm and 9pm        tamsulosin 0.4 MG capsule    FLOMAX    90 capsule    0.4mg tab by mouth daily @ 9am        timolol 0.5 % ophthalmic solution    TIMOPTIC    1 Bottle    1 drop both eyes daily @ 9am        XALATAN 0.005 % ophthalmic solution   Generic drug:  latanoprost     2.5 mL    One drop in both eyes @ 9pm        * Notice:  This list has 2 medication(s) that are the same as other medications prescribed for you. Read the directions carefully, and ask your doctor or other care provider to review them with you.

## 2018-05-22 NOTE — Clinical Note
5/22/2018       RE: Eamon Whalen  [address]  [place] [address]     Dear Colleague,    Thank you for referring your patient, Eamon Whalen, to the [facility] NEUROLOGY at [facility]. Please see a copy of my visit note below.    No notes on file    Again, thank you for allowing me to participate in the care of your patient.      Sincerely,    Edison Villasenor MD

## 2018-05-22 NOTE — PROGRESS NOTES
"SUBJECTIVE/OBJECTIVE:                Eamon Whalen is a 62 year old male coming in for a follow-up visit for Medication Therapy Management.  He was referred to me from Dr. Villasenor. He also met with Dr. Villasenor today.    Chief Complaint: Follow up from our visit on 2/20/18  Tobacco: cigar occasionally  Alcohol: 1-3 beverages / week    Medication Adherence/Access: no issues reported    Parkinson's Disease:  Current medications include: Rytary 36. mg 3 capsules three times daily (9 am, 3 pm, and 9 pm) and ropinirole XL 4 mg daily. Patient states he sleeps \"solid\" until 3-3:30 am and then gets up to go to the bathroom and getting back to sleep after that is difficult. He has been more active recently walking outside - pushed a  for 2 hours last week, though this was \"too much.\" He states that he is still having muscle tightening and freezing during the day. Physical activity causes more muscle tightness. For the most part when his legs are relaxed, he doesn't have tremor. He endorses some hallucinations and will sometimes talk with the people he sees (it comes and goes, multiple times a week). When he goes to the grocery he feels somebody behind him. He also has been looking for a more comfortable chair to use at home because he gets so stiff in many chairs. Patient endorses more slowness and freezing recently too.    BPH: Current medications include tamsulosin 0.4 mg, finasteride 5 mg once daily, saw palmetto 160 mg three times daily (only for the past month). He states \"my dad had success with saw palmetto\" so he would like to continue this. He does not report noticing any improvement in urination since stating tamsulosin and finasteride about 2 months ago. He will be following up with his urologist later this month. He had a prostate biopsy which was negative and has another one coming up.    Seborrhea: Patient is using a ketoconazole creams and shampoos but feels his skin continues to worsen. During his " visit with Dr. Villasenor today, Dr. Villasenor decided to refer him to dermatology.    ASCVD prevention: Patient is currently taking aspirin 325 mg for prevention. He denies bruising/bleeding. He does not have a history of stroke or MI.    Current labs include:  BP Readings from Last 3 Encounters:   05/22/18 132/81   02/20/18 (!) 168/100   11/21/17 141/78     ASSESSMENT:              Current medications were reviewed today as discussed above.      Medication Adherence: excellent, no issues identified    Parkinson's Disease:  Needs improvement. Patient may benefit from a higher dose of Ryary, as discussed with Dr. Villasenor. After on a higher dose of Rytary, he may benefit from tapering down the ropinirole, which may be contributing to hallucinations. Would not treat the hallucinations at this time since his motor function is not well controlled and prefer to make one change at a time. The hallucinations are also not currently dangerous or too bothersome to the patient.    BPH: Needs improvement. Patient would benefit from follow up with urology. Finasteride may take months for efficacy but tamsulosin should be effective by now and he may need to take an alternate medication.    Seborrhea: Needs improvement. Patient would benefit from seeing dermatology.    ASCVD prevention: Needs improvement. Patient would benefit from lower dose of aspirin as he does not have an indication for the 325 mg dose and it may pose more bleeding risk than benefit.     PLAN:                  1. Increase Rytary to 195 mg capsules - take 3 capsules 3 times daily.  2. Patient to follow up with urologist (Dr. Goldberg) to discuss BPH.  3. Patient to see dermatology (referral placed by Dr. Villasenor today).  4. Patient to decrease aspirin to 81 mg daily.    I spent 30 minutes with this patient today. All changes were made via collaborative practice agreement with Dr. Edison Villasenor. A copy of the visit note was provided to the movement disorder specialist,   [place].     Will follow up in 3 months (returning to see Dr. Villasenor 8/21/18)    I concur with the note as dictated above which reflects our joint assessment and plan.   Hannah Melo PharmD      The patient was given a summary of these recommendations as an after visit summary.    Sheree Neumann, Pharm.D.  Medication Therapy Management Resident  Phone: [phone]  Pager: [phone]

## 2018-05-22 NOTE — PATIENT INSTRUCTIONS
PATIENT: Eamon Whalen  62 year old male     : 1955    MERCEDES: May 22, 2018    Parkinson    rytary seems to be working in regards to tremor control except with stress  He has had some balance problems  He has leg tremors bilateral but more on the left than right side.     He came by blue ride - transportation issue    PSA elevation had two prostate biopsies and was negative  And is continued to be followed by urology Dr. Goldberg  He had a flow study. He has nocturia still.  He is on flomax which has not helped.  He is on saw palmetto 3 times per day - dose of this is unknown.   Seeing Dr. Goldberg urology and was started on a new medication but cannot recall the name of the medication but it is a blue round pill taken once daily     Thinks someone is looking over his shoulder.   He is worrying about his family - states he had to put his will and estate plan together  His daughter Marlin lives in [place] (and is 35) and states she will do a good job. She has two sons: Edwar and Kam    He has had freezing.   He continues to have balance problems  He tries to remain active in his home.     He had freezing when visiting his daughter  He has more freezing in small spaces.     Will be getting a new mattress  He has been sleeping on a box spring  He will try to get a bathroom on the main floor so that he does not have to do stairs.       Medications     9am 3pm 9pm     Aspirin 325mg 1       Carbidopa-levodopa ER rytary 145mg 3 3 3     Fluocinolone acetonide derma-smoothe/FS Body 0.01% oil        Ketoconazole nizoral 2% cream        Ketoconazole nizoral 2% shampoo        Latanoprost xalatan 0.005% opthalmic solution   Both eyes     Ropinirole requip XL 4mg TB24   1     Saw palmetto sernoa repens 1 1 1     Tamsulosin flomax 0.4mg 1       Timolol timoptic 0.5% ophthalmic solution Both eyes                       Bladder medication once daily blue round pill                                    History obtained from  patient     He does not have clear wearing off from the rytary  He has some fogginess.       PLAN  Dermatology consultation in [place] or [place] - may need light therapy or something as his skin problems continue    Seeing Sheree Neumann today.  May need confirmation on his bladder medication from [facility]  [phone]      Consider med rytary adjustment, etc.     Return 6 months.

## 2018-05-23 ENCOUNTER — TELEPHONE (OUTPATIENT)
Dept: NEUROLOGY | Facility: CLINIC | Age: 63
End: 2018-05-23

## 2018-05-23 DIAGNOSIS — L21.9 SEBORRHEA: Primary | ICD-10-CM

## 2018-05-23 NOTE — TELEPHONE ENCOUNTER
Prior Authorization Specialty Medication Request    Medication/Dose:   ICD code (if different than what is on RX):    Seborrhea [L21.9]       Paralysis agitans (H) [G20]           Previously Tried and Failed:  Ketoconazole 2%     Important Lab Values: NA  Rationale: See diagnoses above    Insurance Name: [place] Medicaid  Insurance ID: [id number]  Insurance Phone Number: NA    Pharmacy Information (if different than what is on RX)  Name:  [facility]  Phone:  [phone]

## 2018-05-24 NOTE — TELEPHONE ENCOUNTER
This was denied on 03/14/2018 (same insurance [facility], prepaid health plan of [place]). See criteria. If there is a reason these medications cannot be tried, we would need a Letter of Medical Necessity routed back to the P/A team.

## 2018-05-29 ENCOUNTER — TELEPHONE (OUTPATIENT)
Dept: PHARMACY | Facility: CLINIC | Age: 63
End: 2018-05-29

## 2018-05-29 NOTE — TELEPHONE ENCOUNTER
Patient called to inform us that his new Rytary dose (195 mg capsules) arrived today. He will complete the last few doses of 145 mg capsules and then will switch to the 195 mg capsules.     Patient ased about fluid intake and if he can drink sparkling water with Parkinson's disease. We discussed that drinking fluids in the morning will be important because he is trying to limit fluids in the afternoon/evening to prevent nighttime awakenings. I also told him that sparkling water is okay to drink.    Patient also ased about involvement in current research studies. Will route message to Hannah Cisse RN to reach out to patient.    Sheree Neumann, Pharm.D.  Medication Therapy Management Resident  Phone: [phone]  Pager: [phone]

## 2018-05-30 RX ORDER — FLUOCINOLONE ACETONIDE 0.11 MG/ML
OIL TOPICAL
Qty: 1 BOTTLE | Refills: 11 | Status: SHIPPED | OUTPATIENT
Start: 2018-05-30 | End: 2018-08-21

## 2018-05-30 NOTE — TELEPHONE ENCOUNTER
His insurance prefers the following products:        Fluocinolone acetonide scalp oil 0.01% should be an appropriate alternative. Routing to Dr. Villasenor for okay to change to this product.    Sheree Neumann, Pharm.D.  Medication Therapy Management Resident  Phone: [phone]  Pager: [phone]

## 2018-06-04 ENCOUNTER — TELEPHONE (OUTPATIENT)
Dept: NEUROLOGY | Facility: CLINIC | Age: 63
End: 2018-06-04

## 2018-06-04 NOTE — TELEPHONE ENCOUNTER
Prior Authorization Retail Medication Request    Medication/Dose:   ICD code (if different than what is on RX):  Seborrhea [L21.9]  Previously Tried and Failed:  NA  Rationale:  NA    Insurance Name:  [place] Medicaid  Insurance ID:  [id number]      Pharmacy Information (if different than what is on RX)  Name:  [facility]  Phone:  [phone]

## 2018-06-05 ENCOUNTER — TELEPHONE (OUTPATIENT)
Dept: PHARMACY | Facility: CLINIC | Age: 63
End: 2018-06-05

## 2018-06-05 NOTE — TELEPHONE ENCOUNTER
Patient left message for [place] pharmacist regarding his saw palmetto prescription and to let me know that he is taking 450 mg three times daily. Medication list updated.     Patient is requesting a call back to discuss fatigue as well when I am available.    Sheree Neumann, Pharm.D.  Medication Therapy Management Resident  Phone: [phone]  Pager: [phone]

## 2018-06-07 NOTE — TELEPHONE ENCOUNTER
Central Prior Authorization Team   Phone: [phone]    PA Initiation    Medication: Fluocinolone Acetonide Scalp 0.01 % OIL-PA Initiated  Insurance Company: [employer] - Phone [phone] Fax [phone]  Pharmacy Filling the Rx: [id number] - [place], [place] - [address] [place] [address]  Filling Pharmacy Phone: [phone]  Filling Pharmacy Fax: [phone]  Start Date: 6/7/2018

## 2018-06-07 NOTE — TELEPHONE ENCOUNTER
Prior Authorization Not Needed per Insurance    Medication: Fluocinolone Acetonide Scalp 0.01 % OIL-PA not needed  Insurance Company: [employer] BLUE PLUS - Phone [phone] Fax [phone]  Expected CoPay:      Pharmacy Filling the Rx: [id number] - [place] - [address]  Pharmacy Notified: Yes  Patient Notified: No    Per pharmacy, patient already filled this. Disregard PA.

## 2018-06-12 ENCOUNTER — TELEPHONE (OUTPATIENT)
Dept: PHARMACY | Facility: CLINIC | Age: 63
End: 2018-06-12

## 2018-06-12 NOTE — TELEPHONE ENCOUNTER
Spoke with patient - see telephone encounter dated 6/12/18.    Sheree Neumann, Pharm.D.  Medication Therapy Management Resident  Phone: [phone]  Pager: [phone]

## 2018-06-12 NOTE — TELEPHONE ENCOUNTER
"Patient called to report that he started the higher dose of Rytary yesterday and has noticed that he still has some leg tremor. He also states that he feels more \"foggy in the brain\" and is hallucinating again. I encouraged him to continue on Rytary for another 5-7 days and then we can decide whether to decrease the ropinirole or switch back to his old regimen of carbidopa-levodopa.    Sheree Neumann, Pharm.D.  Medication Therapy Management Resident  Phone: [phone]  Pager: [phone]  "

## 2018-07-03 ENCOUNTER — TELEPHONE (OUTPATIENT)
Dept: PHARMACY | Facility: CLINIC | Age: 63
End: 2018-07-03

## 2018-07-03 NOTE — TELEPHONE ENCOUNTER
Patient called [place] pharmacist and left message requesting a call back to discuss medication questions.     Sheree Neumann, Pharm.D.  Medication Therapy Management Pharmacist  Phone: [phone]

## 2018-07-03 NOTE — TELEPHONE ENCOUNTER
Spoke with patient. He states that he's had a lot more hallucinations and asks what his options are for treatment of hallucinations. We briefly discussed the following options:   1) Reduce ropinirole dose.  2) Reduce Rytary dose.  3) Switch Rytary back to Sinemet IR.  4) Add quetiapine.  5) Add Nuplazid.     Patient will think about these options and would like to discuss more in-depth at upcoming appt on 8/21/18.     Sheree Neumann, Pharm.D.  Medication Therapy Management Pharmacist  Phone: [phone]

## 2018-08-01 ENCOUNTER — TELEPHONE (OUTPATIENT)
Dept: NEUROLOGY | Facility: CLINIC | Age: 63
End: 2018-08-01

## 2018-08-01 NOTE — LETTER
To whom this may concern:         We are asking for consideration of an appeal for our patient, Eamon Whalen, for ropinirole XL 4 mg nightly for treatment of Parkinson's Disease. He has been taking this medication for over 5 years and continues to require it for management of his Parkinson's Disease symptoms, in addition to Rytary. It would be advantageous to keep this patient on his current regimen, as he has previously been sensitive to medication changes and even was hospitalized this year when his Parkinson's disease was not well controlled.      Of note, he has also tried and failed the following medications:    Carbidopa-levodopa IR    Mirapex    Artane       Therefore, we are appealing this PA denial decision and would like for you to re-consider approval of ropinirole XL for this patient's Parkinson's Disease.      Thank you,           Edison Villasenor MD and Sheree Neumann, PharmD  [phone]

## 2018-08-01 NOTE — TELEPHONE ENCOUNTER
Prior Authorization Retail Medication Request    Medication/Dose: Ropinirole ER 4 mg tab, 1 tab at 9 pm  ICD code (if different than what is on RX):  Parkinson's G20  Previously Tried and Failed:  Mirapex, ropinirole 12 mg, 8 mg, artane, carbidopa/levodopa   Rationale:  This used in combination with his other Parkinson's disease medications has helped him    Insurance Name:  [place] Medicaid  Insurance ID:  [id number]  BIN [id number]  Phone: [phone]      Pharmacy Information (if different than what is on RX)  [facility]  Phone:  [phone]

## 2018-08-02 NOTE — TELEPHONE ENCOUNTER
PA Initiation    Medication: Ropinirole ER 4 mg tab, 1 tab at 9 pm -   Insurance Company: [employer] - Phone [phone] Fax [phone]  Pharmacy Filling the Rx: [id number], [place] - [address] [place] [address]  Filling Pharmacy Phone: [phone]  Filling Pharmacy Fax: [phone]  Start Date: 8/2/2018

## 2018-08-03 NOTE — TELEPHONE ENCOUNTER
PRIOR AUTHORIZATION DENIED    Medication: Ropinirole ER 4 mg tab, 1 tab at 9 pm - DENIED    Denial Date: 8/3/2018    Denial Rational:          Appeal Information:

## 2018-08-03 NOTE — TELEPHONE ENCOUNTER
Medication Appeal Initiation    We have initiated an appeal for the requested medication:  Medication: Ropinirole ER 4 mg tab, 1 tab at 9 pm - DENIED- Appeal  Appeal Start Date:  8/3/2018  Insurance Company: [employer] [place] - Phone [phone] Fax [phone]  Comments:

## 2018-08-07 ENCOUNTER — TELEPHONE (OUTPATIENT)
Dept: PHARMACY | Facility: CLINIC | Age: 63
End: 2018-08-07

## 2018-08-07 NOTE — TELEPHONE ENCOUNTER
Left message for patient to let him know that the appeal is in process and he can call back with any further questions.    Sheree Neumann, Pharm.D.  Medication Therapy Management Pharmacist  Phone: [phone]

## 2018-08-07 NOTE — TELEPHONE ENCOUNTER
Received call from patient requesting status of ropinirole PA/appeal. The Appeal was submitted on 8/3/18. He is requesting a call back at [phone].    Sheree Neumann, Pharm.D.  Medication Therapy Management Pharmacist  Phone: [phone]

## 2018-08-08 NOTE — TELEPHONE ENCOUNTER
** Spoke with insurance rep, she stated no appeal was received, resent appeal manually via fax, and re--submitted online as well.

## 2018-08-09 NOTE — TELEPHONE ENCOUNTER
MEDICATION APPEAL APPROVED    Medication: Ropinirole ER 4 mg tab, 1 tab at 9 pm - DENIED- Appeal: APPROVED  Authorization Effective Date: 8/6/2018  Authorization Expiration Date: 8/6/2019  Approved Dose/Quantity:  Reference #: [id number]   Insurance Company: [employer] [place] - Phone [phone] Fax [phone]  Expected CoPay:       CoPay Card Available:      Foundation Assistance Needed:    Which Pharmacy is filling the prescription (Not needed for infusion/clinic administered): [id number] ([facility], [address]

## 2018-08-21 ENCOUNTER — TELEPHONE (OUTPATIENT)
Dept: PHARMACY | Facility: CLINIC | Age: 63
End: 2018-08-21

## 2018-08-21 ENCOUNTER — PATIENT OUTREACH (OUTPATIENT)
Dept: CARE COORDINATION | Facility: CLINIC | Age: 63
End: 2018-08-21

## 2018-08-21 ENCOUNTER — OFFICE VISIT (OUTPATIENT)
Dept: PHARMACY | Facility: CLINIC | Age: 63
End: 2018-08-21
Payer: COMMERCIAL

## 2018-08-21 DIAGNOSIS — N40.0 BENIGN PROSTATIC HYPERPLASIA, UNSPECIFIED WHETHER LOWER URINARY TRACT SYMPTOMS PRESENT: ICD-10-CM

## 2018-08-21 DIAGNOSIS — G20.A1 PARALYSIS AGITANS (H): Primary | ICD-10-CM

## 2018-08-21 PROCEDURE — 99607 MTMS BY PHARM ADDL 15 MIN: CPT | Performed by: PHARMACIST

## 2018-08-21 PROCEDURE — 99606 MTMS BY PHARM EST 15 MIN: CPT | Performed by: PHARMACIST

## 2018-08-21 RX ORDER — PRENATAL VIT 91/IRON/FOLIC/DHA 28-975-200
COMBINATION PACKAGE (EA) ORAL 2 TIMES DAILY
COMMUNITY
End: 2019-02-25

## 2018-08-21 RX ORDER — ROPINIROLE 2 MG/1
2 TABLET, FILM COATED, EXTENDED RELEASE ORAL AT BEDTIME
Qty: 30 TABLET | Refills: 5 | Status: SHIPPED | OUTPATIENT
Start: 2018-08-21 | End: 2018-11-08

## 2018-08-21 RX ORDER — QUETIAPINE FUMARATE 25 MG/1
TABLET, FILM COATED ORAL
Qty: 30 TABLET | Refills: 5 | Status: SHIPPED | OUTPATIENT
Start: 2018-08-21 | End: 2018-11-08

## 2018-08-21 NOTE — PROGRESS NOTES
SUBJECTIVE/OBJECTIVE:                Eamon Whalen is a 62 year old male coming in for a follow-up visit for Medication Therapy Management.  He was referred to me from Dr. Villasenor.     Chief Complaint: Follow up from our visit on 5/22/18  Tobacco: cigar occasionally  Alcohol: 1-3 beverages / week    Medication Adherence/Access: no issues reported    Parkinson's Disease:  Current medications include: Rytary 36. mg 3 capsules 3 times daily (9 am, 3 pm, and 9 pm) and ropinirole XL 4 mg daily. Pt reports that symptoms of PD are worsened. Current PD symptoms include: Akinesia/bradykinesia, Sleep disturbances and Psychosis/Hallucinations. Patient reports that he is most concerned about his worsening hallucinations. He has had some more severe episodes of confusion in the past few months, such as being found naked in his daughter's garage and being found punching the couch. He expresses that he is concerned about his relationship with his daughter after these episodes and requests that I speak with her about his disease.     BPH: Currently taking tamsulosin 0.4 mg daily, finasteride 5 mg daily, and saw palmetto 160 mg three times daily. He states that he continues to have poor urinary control in the evenings and wakes up 2-3 times per night to urinate. Patient states that this continues to interfere with his sleep and makes it difficult for him to have a restful night.       ASSESSMENT:              Current medications were reviewed today as discussed above.      Medication Adherence: excellent, no issues identified    Parkinson's Disease:  Needs improvement.  Patient's psychosis and confusion continues to worsen.  He may benefit from a reduction in ropinirole dose as well as a trial of quetiapine.  Discussed with Dr. Villasenor and he recommended starting quetiapine first and then reducing the ropinirole dose.  Patient's motor symptoms also do not appear to be controlled at this time but his primary concern is the  hallucinations.  We may need to increase the Rytary in the future after reducing the ropinirole dose.    BPH: Needs improvement.  It does not appear as though these medications have been helpful for his frequent urination.  He may benefit from taking quetiapine at bedtime which may help him sleep and therefore not wake up as easily during the night.  Could discuss other treatments of BPH in the future.     PLAN:                  1. Initiate quetiapine 12.5 mg nightly.  May increase to 25 mg after 1-2 weeks if needed.  2. Plan to reduce the dose of ropinirole to 2 mg daily when we check in in 2 weeks; updated Rx sent to pharmacy.     I spent 40 minutes with this patient today. All changes were made via collaborative practice agreement with Dr. Villasenor. A copy of the visit note was provided to the patient's referring provider.     Will follow up in 2 weeks by phone: 9/4/18.    The patient was given a summary of these recommendations as an after visit summary.    Sheree Neumann, Pharm.D.  Medication Therapy Management Pharmacist  Phone: [phone]

## 2018-08-21 NOTE — MR AVS SNAPSHOT
After Visit Summary   8/21/2018    Eamon Whalen    MRN: [id number]           Patient Information     Date Of Birth          1955        Visit Information        Provider Department      8/21/2018 3:00 PM Sheree Neumann [facility]        Care Instructions    Recommendations from today's MTM visit:                                                      1. We are going to decrease your ropinirole from 4 mg/day to 2 mg/day for one week, then we will decrease to 1 mg/day for one week then stop.    2. We will check in by phone in two weeks and may increase Rytary if needed.     3. We may also add quetiapine (Seroquel) for hallucinations/insomnia if needed.     Next MTM visit: 2 weeks by phone - 9/4/18 at 10 am (I will call you)    To schedule another MTM appointment, please call the clinic directly or you may call the MTM scheduling line at [phone] or toll-free at [phone].     My Clinical Pharmacist's contact information:                                                      It was a pleasure seeing you today!  Please feel free to contact me with any questions or concerns you have.      Sheree Neumann, Pharm.D.  Medication Therapy Management Pharmacist  Phone: [phone]    You may receive a survey about the MTM services you received.  I would appreciate your feedback to help me serve you better in the future. Please fill it out and return it when you can. Your comments will be anonymous.            Follow-ups after your visit        Your next 10 appointments already scheduled     Sep 04, 2018 10:00 AM CDT   TELEMEDICINE with Sheree Neumann [facility] ([facility])    [address] [place] [id number] [phone]           Note: this is not an onsite visit; there is no need to come to the facility.              Who to contact     If you have questions or need follow up information about  today's clinic visit or your schedule please contact [facility]OLOG[facility] CL[facility] directly at [phone].  Normal or non-critical lab and imaging results will be communicated to you by Vidimaxhart, letter or phone within 4 business days after the clinic has received the results. If you do not hear from us within 7 days, please contact the clinic through Vidimaxhart or phone. If you have a critical or abnormal lab result, we will notify you by phone as soon as possible.  Submit refill requests through AudioBeta or call your pharmacy and they will forward the refill request to us. Please allow 3 business days for your refill to be completed.          Additional Information About Your Visit        Vidimaxhart Information     AudioBeta gives you secure access to your electronic health record. If you see a primary care provider, you can also send messages to your care team and make appointments. If you have questions, please call your primary care clinic.  If you do not have a primary care provider, please call [phone] and they will assist you.        Care EveryWhere ID     This is your Care EveryWhere ID. This could be used by other organizations to access your [facility] medical records  [id number]         Blood Pressure from Last 3 Encounters:   05/22/18 132/81   02/20/18 (!) 168/100   11/21/17 141/78    Weight from Last 3 Encounters:   05/22/18 155 lb (70.3 kg)   02/20/18 152 lb 11.2 oz (69.3 kg)   11/21/17 145 lb (65.8 kg)              Today, you had the following     No orders found for display         Today's Medication Changes          These changes are accurate as of 8/21/18  3:42 PM.  If you have any questions, ask your nurse or doctor.               These medicines have changed or have updated prescriptions.        Dose/Directions    carbidopa-levodopa 48. MG Cpcr per CR capsule   Commonly known as:  RYTARY   This may have changed:  Another medication with the same name was removed. Continue taking this  medication, and follow the directions you see here.   Used for:  Paralysis agitans (H)   Changed by:  Edison Villasenor MD        Dose:  3 capsule   Take 3 capsules by mouth 3 times daily   Quantity:  270 capsule   Refills:  11         Stop taking these medicines if you haven't already. Please contact your care team if you have questions.     Ropinirole HCl 4 MG Tb24   Commonly known as:  REQUIP XL   Stopped by:  Sheree Neumann [facility]                    Primary Care Provider Office Phone # Fax #    Bernardino John [phone]       [facility] [address]  [facility] [address]        Equal Access to Services     [facility] AH: Hadii aad ku hadasho Soomaali, waaxda luqadaha, qaybta kaalmada adeegyada, waxay armandoin hayaan adecurtis lewis . So [facility] [phone].    ATENCIÓN: Si habla español, tiene a olmstead disposición servicios gratuitos de asistencia lingüística. [facility] [phone].    We comply with applicable federal civil rights laws and [place] laws. We do not discriminate on the basis of race, color, national origin, age, disability, sex, sexual orientation, or gender identity.            Thank you!     Thank you for choosing [facility]UROLOGY CLINIC [facility]  for your care. Our goal is always to provide you with excellent care. Hearing back from our patients is one way we can continue to improve our services. Please take a few minutes to complete the written survey that you may receive in the mail after your visit with us. Thank you!             Your Updated Medication List - Protect others around you: Learn how to safely use, store and throw away your medicines at www.disposemymeds.org.          This list is accurate as of 8/21/18  3:42 PM.  Always use your most recent med list.                   Brand Name Dispense Instructions for use Diagnosis    aspirin 81 MG EC tablet      Take 81 mg by mouth daily        carbidopa-levodopa 48. MG Cpcr per CR capsule    RYTARY     270 capsule    Take 3 capsules by mouth 3 times daily    Paralysis agitans (H)       finasteride 5 MG tablet    PROSCAR     Take 5 mg by mouth daily        mometasone 0.1 % cream    ELOCON          Saw Palmetto (Serenoa repens) 450 MG Caps      Take 1 capsule by mouth 3 times daily        tamsulosin 0.4 MG capsule    FLOMAX    90 capsule    0.4mg tab by mouth daily @ 9am        terbinafine 1 % cream    lamISIL     Apply topically 2 times daily        timolol 0.5 % ophthalmic solution    TIMOPTIC    1 Bottle    1 drop both eyes daily @ 9am        XALATAN 0.005 % ophthalmic solution   Generic drug:  latanoprost     2.5 mL    One drop in both eyes @ 9pm

## 2018-08-21 NOTE — PATIENT INSTRUCTIONS
Recommendations from today's MTM visit:                                                      1. We are going to decrease your ropinirole from 4 mg/day to 2 mg/day for one week, then we will decrease to 1 mg/day for one week then stop.    2. We will check in by phone in two weeks and may increase Rytary if needed.     3. We may also add quetiapine (Seroquel) for hallucinations/insomnia if needed.     Next MTM visit: 2 weeks by phone - 9/4/18 at 10 am (I will call you)    To schedule another MTM appointment, please call the clinic directly or you may call the MTM scheduling line at [phone] or toll-free at [phone].     My Clinical Pharmacist's contact information:                                                      It was a pleasure seeing you today!  Please feel free to contact me with any questions or concerns you have.      Sheree Neumann, Pharm.D.  Medication Therapy Management Pharmacist  Phone: [phone]    You may receive a survey about the MTM services you received.  I would appreciate your feedback to help me serve you better in the future. Please fill it out and return it when you can. Your comments will be anonymous.

## 2018-08-21 NOTE — TELEPHONE ENCOUNTER
A representative from [facility] called to state that there is a transportation issue for the patient today, but he should be able to arrive at the [facility] between 1-1:30 pm to see Dr. Villasenor and will see MTM later in the afternoon (he was rescheduled for 3 pm with MTM).     Sheree Neumann, Pharm.D.  Medication Therapy Management Pharmacist  Phone: [phone]

## 2018-09-04 ENCOUNTER — TELEPHONE (OUTPATIENT)
Dept: PHARMACY | Facility: CLINIC | Age: 63
End: 2018-09-04

## 2018-09-04 NOTE — TELEPHONE ENCOUNTER
Spoke with patient's daughter, Marlin, at request of patient. He asked that I provide her an update on his medications and clinical status. We briefly discussed the medications he is on, their purpose, and what to monitor for. I also provided her with my phone number so that she can reach out in the future should she have any questions or concerns. Patient signed consent to communicate form at last clinic visit so that we can communicate with his daughter.     Sheree Neumann, Pharm.D.  Medication Therapy Management Pharmacist  Phone: [phone]

## 2018-09-04 NOTE — TELEPHONE ENCOUNTER
"Patient called back and states that his walking has been \"up and down.\" He did reduce ropinirole to 2 mg daily and also continues to take quetiapine 12.5 mg nightly. After reducing ropinirole his walking worsened but has since returned to baseline, per patient.     On Saturday nigh he reports sleeping through the entire night without waking up to go to the bathroom, which is unusual for him and he is pleased with this. He is planning to increase the quetiapine to 25 mg nightly as we previously discussed.     Sheree Neumann, Pharm.D.  Medication Therapy Management Pharmacist  Phone: [phone]  " 49 y/o F presents to ED s/p rolling her ankle while crossing the street.  Pt states she now has pain with bearing weight. 49 y/o F presents to ED s/p rolling her ankle while crossing the street.  Pt states she now has pain with bearing weight.  She denies any other injures.  Denies numbness/tingling or swelling.

## 2018-09-14 ENCOUNTER — TELEPHONE (OUTPATIENT)
Dept: PHARMACY | Facility: CLINIC | Age: 63
End: 2018-09-14

## 2018-09-14 NOTE — TELEPHONE ENCOUNTER
Patient left voicemail and is requesting a call back from [facility] pharmacist. Attempted to call him back - left message for him to return my call either this afternoon between 1:30-2:30 pm or tomorrow morning 8-10:30 am.     Sheree Neumann, Pharm.D.  Medication Therapy Management Pharmacist  Phone: [phone]

## 2018-10-02 ENCOUNTER — ALLIED HEALTH/NURSE VISIT (OUTPATIENT)
Dept: PHARMACY | Facility: CLINIC | Age: 63
End: 2018-10-02
Payer: COMMERCIAL

## 2018-10-02 DIAGNOSIS — G20.A1 PARALYSIS AGITANS (H): Primary | ICD-10-CM

## 2018-10-02 PROCEDURE — 99207 ZZC NO CHARGE LOS: CPT | Performed by: PHARMACIST

## 2018-10-02 NOTE — Clinical Note
Johnson Jerry - This patient has been having some family issues. His daughter won't talk with him because he has been having hallucinations associated with his Parkinson's disease. Could you give him a call and see what we could do to help? I did talk with his daughter on 9/4/18 upon request of patient (consent to communicate on file) but I don't think it's appropriate for me to continue intervening on these issues. Patient was at first hesitant to have you reach out to him so if you can explain that I was the one who suggested you call he will respond and should be able to discuss with you.   Thank you so much! Sheree Neumann, Pharm.D. Medication Therapy Management Pharmacist Phone: [phone]

## 2018-10-02 NOTE — PROGRESS NOTES
"Therapy Management:                                                    Eamon Whalen is a 62 year old male called for a therapy management visit.  He was referred to me from Dr. Villasenor.     Reason for Consult: follow up on hallucinations    Discussion: Patient has been taking quetiapine 25 mg at bedtime and has reduced ropinirole to 2 mg/day. He states that he continues to talk out loud at night and has some hallucinations during the day. He describes this as a \"fogginess.\" States overall the hallucinations have improved since starting quetiapine so he would prefer to not make any medication changes today. He does express continued concern about his relationship with his daughter. States that she won't talk with him due to fears of the hallucinations he has. He is open to talking with our  about this.    Plan:  1. Refer patient to Claritza Fall .   2. Encouraged patient to make a follow up appointment with Dr. Villasenor in neurology clinic.    Sheree Neumann, Pharm.D.  Medication Therapy Management Pharmacist  Phone: [phone]  "

## 2018-10-02 NOTE — MR AVS SNAPSHOT
After Visit Summary   10/2/2018    Eamon Whalen    MRN: [id number]           Patient Information     Date Of Birth          1955        Visit Information        Provider Department      10/2/2018 10:00 AM Sheree Neumann RPH [facility]        Today's Diagnoses     Paralysis agitans (H)    -  1       Follow-ups after your visit        Follow-up notes from your care team     Return in about 1 month (around 11/2/2018) for Medication Therapy Management.      Who to contact     If you have questions or need follow up information about today's clinic visit or your schedule please contact [facility] NEUROLOG[facility] directly at [phone].  Normal or non-critical lab and imaging results will be communicated to you by MyChart, letter or phone within 4 business days after the clinic has received the results. If you do not hear from us within 7 days, please contact the clinic through Dattchhart or phone. If you have a critical or abnormal lab result, we will notify you by phone as soon as possible.  Submit refill requests through CE[employer] Safe Drive or call your pharmacy and they will forward the refill request to us. Please allow 3 business days for your refill to be completed.          Additional Information About Your Visit        MyChart Information     CE[employer] gives you secure access to your electronic health record. If you see a primary care provider, you can also send messages to your care team and make appointments. If you have questions, please call your primary care clinic.  If you do not have a primary care provider, please call [phone] and they will assist you.        Care EveryWhere ID     This is your Care EveryWhere ID. This could be used by other organizations to access your [place] medical records  [id number]         Blood Pressure from Last 3 Encounters:   05/22/18 132/81   02/20/18 (!) 168/100   11/21/17 141/78    Weight from Last 3 Encounters:   05/22/18 155 lb  (70.3 kg)   02/20/18 152 lb 11.2 oz (69.3 kg)   11/21/17 145 lb (65.8 kg)              Today, you had the following     No orders found for display       Primary Care Provider Office Phone # Fax #    Bernardino John [phone]       [facility] [address]  [facility] [address]        Equal Access to Services     CORI MUÑOZ : Hadii aad ku hadasho Soomaali, waaxda luqadaha, qaybta kaalmada adeegyada, waxay idiin hayaan adeeg khgiselsh la'aan ah. So [facility] [phone].    ATENCIÓN: Si krystal drake, tiene a olmstead disposición servicios gratuitos de asistencia lingüística. Llame al [phone].    We comply with applicable federal civil rights laws and [place] laws. We do not discriminate on the basis of race, color, national origin, age, disability, sex, sexual orientation, or gender identity.            Thank you!     Thank you for choosing [facility]OLOG[facility] CLINIC [facility]  for your care. Our goal is always to provide you with excellent care. Hearing back from our patients is one way we can continue to improve our services. Please take a few minutes to complete the written survey that you may receive in the mail after your visit with us. Thank you!             Your Updated Medication List - Protect others around you: Learn how to safely use, store and throw away your medicines at www.disposemymeds.org.          This list is accurate as of 10/2/18 10:18 AM.  Always use your most recent med list.                   Brand Name Dispense Instructions for use Diagnosis    aspirin 81 MG EC tablet      Take 81 mg by mouth daily        carbidopa-levodopa 48. MG Cpcr per CR capsule    RYTARY    270 capsule    Take 3 capsules by mouth 3 times daily    Paralysis agitans (H)       finasteride 5 MG tablet    PROSCAR     Take 5 mg by mouth daily        mometasone 0.1 % cream    ELOCON          QUEtiapine 25 MG tablet    SEROQUEL    30 tablet    Take one-half tablet by mouth nightly. May increase to one  tablet nightly after 1-2 weeks if not effective.    Paralysis agitans (H)       rOPINIRole HCl 2 MG Tb24 tablet    REQUIP    30 tablet    Take 1 tablet (2 mg) by mouth At Bedtime (dose reduction to start in two weeks, on 8/28/18)    Paralysis agitans (H)       Saw Palmetto (Serenoa repens) 450 MG Caps      Take 1 capsule by mouth 3 times daily        tamsulosin 0.4 MG capsule    FLOMAX    90 capsule    0.4mg tab by mouth daily @ 9am        terbinafine 1 % cream    lamISIL     Apply topically 2 times daily        timolol 0.5 % ophthalmic solution    TIMOPTIC    1 Bottle    1 drop both eyes daily @ 9am        XALATAN 0.005 % ophthalmic solution   Generic drug:  latanoprost     2.5 mL    One drop in both eyes @ 9pm

## 2018-10-02 NOTE — TELEPHONE ENCOUNTER
See MTM consult encounter dated 10/2/18.    Sheree Neumann, Pharm.D.  Medication Therapy Management Pharmacist  Phone: [phone]

## 2018-10-09 ENCOUNTER — TELEPHONE (OUTPATIENT)
Dept: PHARMACY | Facility: CLINIC | Age: 63
End: 2018-10-09

## 2018-10-09 NOTE — TELEPHONE ENCOUNTER
Patient left message with the following information:    1) Has not yet heard from Claritza Fall and is requesting a call from her.    --> [facility] pharmacist will route message to Claritza to call patient.  2) Wondering if ropinirole dose should be 2 mg instead of 4 mg. Pharmacy dispensed 4 mg to him.   --> [facility] pharmacist called [facility] pharmacy and they will deactivate the 2 mg prescription and will  refill the 4 mg dose for him.     Sheree Neumann, Pharm.D.  Medication Therapy Management Pharmacist  Phone: [phone]

## 2018-10-09 NOTE — TELEPHONE ENCOUNTER
Attempted to reach patient to let him know that Jose was changing the Rx for ropinirole but his mailbox is full.     Sheree Neumann, Pharm.D.  Medication Therapy Management Pharmacist  Phone: [phone]

## 2018-10-16 ENCOUNTER — PATIENT OUTREACH (OUTPATIENT)
Dept: NEUROLOGY | Facility: CLINIC | Age: 63
End: 2018-10-16

## 2018-10-16 NOTE — PROGRESS NOTES
Social Work Intervention  [facility]    Data/Intervention:    Patient Name:  Eamon Whalen  /Age:  1955 (62 year old)    Visit Type: telephone  Referral Source: Sheree Neumann MT pharmacist  Reason for Referral:  Relationship with dtr    Collaborated With:    Abdulkadir    Patient Concerns/Issues:   Pt discussed that he has hallucinations/dreaming from his parkinson's medications and he has had one that landed him at the police station. He states he woke up as he was being pushed into a police car. He also had one at his dtr's house and when he woke up he found himself naked in his dtr's garage. He believes that is causing her to contact him less often. Her dtr Marlin is 36 and has 2 young sons. Marlin lives in [place] and he would like to stay with her on occasion and participate in family events.   Pt [place] in [place], [place]. He is currently having some home remodeling completed. He has 2 sisters and a brother nearby that help him. He is very adamant that he wants to stay in his own home and not ever go to a facility.     Intervention/Education/Resources Provided:  Discussed with Pt that he should ask his providers about the cause of his dreams/hallucinations. He hasn't done any research on that.  Reviewed that he has talked with his dtr about what happened the cause and that he wouldn't intentionally do anything to hurt her or her family but we also discussed that she may be fearful about her sons and the potential that they could be harmed in some way or witness something that is not appropriate. He indicated that he has offered to stay in a nearby hotel. Discussed also if there was some notification system if he sleeps in a room with a closed door at her home of when he leaves his room, that might be something that could help too. He felt that might be too disruptive as he uses the bathroom a lot to void at night.  He does feel that the medication (?seroquel) is helping  somewhat.  Also reviewed that he would be eligible for PCA services thru his health plan if he ever needs that. He feels he doesn't at this time. He does use the transportation provided by his plan.    Assessment/Plan:  Processed Pt's concerns and encouraged him to keep up the communication with his dtr and to educate himself further about Parkinson's. He plans to do that on the computers at the local library when he can.     Provided patient/family with contact information and availability.    MARIA C Unger, [facility]    [facility]  [phone]

## 2018-10-29 RX ORDER — KETOCONAZOLE 20 MG/ML
SHAMPOO TOPICAL
Qty: 120 ML | Refills: 11 | Status: SHIPPED | OUTPATIENT
Start: 2018-10-29 | End: 2018-11-08

## 2018-10-29 RX ORDER — KETOCONAZOLE 20 MG/G
CREAM TOPICAL DAILY
Qty: 60 G | Refills: 11 | Status: SHIPPED | OUTPATIENT
Start: 2018-10-29 | End: 2018-11-08

## 2018-10-29 NOTE — PROGRESS NOTES
Diagnosis/Summary/Recommendations:    PATIENT: Eamon Whalen  62 year old male     : 1955    MERCEDES: 2018    Parkinson  icd  May need to reduce requip xl to 2mg     Had CTangio, head ct and ecg and xray of his chest.     He is on requip XL 2mg  He is on seroquel 25mg at night  He is using rytary 195mg 3 capsules 3/day  He has some tremor that is controllable    There was a question of hallucinations/confusion/delusions/etc  Details a bit murky but seems okay    He continues to have skin problems.     Medications     9am 3pm 9pm         Aspirin 325mg (may go back to 81mg) 1               Bisacodyl dulcolax 5mg EC tablet Not using            Carbidopa-levodopa ER rytary 195mg 3 3 3         Finasteride proscar 5mg 1           Fluocinolone acetonide derma-smoothe/FS Body 0.01% oil   has not tried               Ketoconazole nizoral 2% cream  not sure if using                Ketoconazole nizoral 2% shampoo                  Latanoprost xalatan 0.005% opthalmic solution       Both eyes         Mometasone elocon 0.1% cream NOT SURE IF USING            Quetiapine seroquel 25mg   1     Ropinirole requip XL 2mg TB24       1         Saw palmetto sernoa repens STOPPED           Tamsulosin flomax 0.4mg 1               Terbinafine lamisil 1% cream Not using           Timolol timoptic 0.5% ophthalmic solution Both eyes                                                                  History obtained from patient    PLAN  Podiatry referral for nails and toenail fungus  Consider going every other night of the requip XL and see if can go off it.   He is using 2mg dose now of requip XL    Continue on the 25mg of seroquel  This can be increased if needed in the future.     Continue on the rtyary 195mg 3 capsules  Times per day     Visit with Sheree Neumann, Berry D today    Return visit.,       Coding statement:   Duration of  Services: patient care and care coordination was 25 minutes  Greater than 50% of this visit was  spent in counseling and coordination of care.     Edison Villasenor MD     ______________________________________    Last visit date and details:     CT Neck Angiogram with IV Rphbitab44/3/2018  [facility]  Result Impression   IMPRESSION:   No vascular abnormality identified.   Result Narrative   EXAM: CT NECK ANGIOGRAM WITH IV CONTRAST   3D/MIPS: 3D Post-Processing performed on a dependent workstation.     COMPARISON: None    FINDINGS:     Right carotid system: Unremarkable     Left carotid system: Unremarkable    Vertebral arteries: Unremarkable    Aortic arch: Unremarkable    Intracranial: Unremarkable    Nonvascular findings: None    Measurement of a carotid stenosis, if present, is based on length parameters  that compare the residual internal carotid luminal diameter with that of the  normal distal ICA in accordance with [employer] American Symptomatic Carotid  Endarterectomy Trial (NASCET).   Other Result Information   Interface, [facility] Generic [id number] - 11/03/2018  7:13 PM CDT  EXAM: CT NECK ANGIOGRAM WITH IV CONTRAST   3D/MIPS: 3D Post-Processing performed on a dependent workstation.     COMPARISON: None    FINDINGS:     Right carotid system: Unremarkable     Left carotid system: Unremarkable    Vertebral arteries: Unremarkable    Aortic arch: Unremarkable    Intracranial: Unremarkable    Nonvascular findings: None    Measurement of a carotid stenosis, if present, is based on length parameters  that compare the residual internal carotid luminal diameter with that of the  normal distal ICA in accordance with North American Symptomatic Carotid  Endarterectomy Trial (NASCET).      IMPRESSION:   No vascular abnormality identified.   Status       2018 November  DX Chest Portable 1 View11/3/2018  [facility]  Result Impression   IMPRESSION: AP chest demonstrates a nodular opacity right midlung unchanged from  7/3/2015. Chest otherwise negative and unchanged.   Result Narrative   EXAM: DX CHEST PORTABLE 1  VIEW   Other Result Information   Interface, [facility] Generic [id number] - 2018  7:25 PM CDT  EXAM: DX CHEST PORTABLE 1 VIEW      IMPRESSION: AP chest demonstrates a nodular opacity right midlung unchanged from  7/3/2015. Chest otherwise negative and unchanged.     CT Head without IV Jrrgtwus78/3/2018  [facility]  Result Impression   IMPRESSION:   No acute findings.   Result Narrative   EXAM: CT HEAD WITHOUT IV CONTRAST    COMPARISON: CT head 3/23/2015    FINDINGS: CT examination the head performed without contrast demonstrates no  evidence of intracranial mass, hemorrhage, or cerebral infarction. Visualized  sinuses and mastoids are clear.   Other Result Information   Interface, [facility] Generic [id number] - 2018  6:30 PM CDT  EXAM: CT HEAD WITHOUT IV CONTRAST    COMPARISON: CT head 3/23/2015    FINDINGS: CT examination the head performed without contrast demonstrates no  evidence of intracranial mass, hemorrhage, or cerebral infarction. Visualized  sinuses and mastoids are clear.      IMPRESSION:   No acute findings.     ECG 12 Lead11/3/2018  [facility]  Component Name Value Ref Range   Ventricular Rate ECG/Min 65 BPM   AK Interval 172 ms   QRSD Interval 144 ms   QT Interval 444 ms   QTC Interval 461 ms   P Axis 70 degrees   R Axis 76 degrees   T Wave Axis 56 degrees   Result Impression   Normal sinus rhythm  Right bundle branch block  When compared with ECG of 27-MAR-2018 12:21,  Nonspecific T wave abnormality no longer evident in Inferior leads   Other Result Information   Interface, [facility] [id number] - 2018  5:36 PM CDT  IMPRESSION:  Normal sinus rhythm  Right bundle branch block  When compared with ECG of 27-MAR-2018 12:21,  Nonspecific T wave abnormality           : 1955     MERCEDES:      No show     2018        Medications     9am 3pm 9pm         Aspirin 325mg 1               Bisacodyl dulcolax 5mg EC tablet             Carbidopa-levodopa ER  rytary 195mg 3 3 3         Carbidopa/levodopa Sinemet CR 50/200 Not taking           CArbidopa/levodopa sinemet 25/100 Not taking           Ciprofloxaxin cipro 500mg Not taking           Finasteride proscar 5mg ?taking           Fluocinolone acetonide derma-smoothe/FS Body 0.01% oil                  Gavilyte-G 236 g suspension ?taking           Ketoconazole nizoral 2% cream                  Ketoconazole nizoral 2% shampoo                  Latanoprost xalatan 0.005% opthalmic solution       Both eyes         Mometasone elocon 0.1% cream             Ropinirole requip 2mg tablet     1?       Ropinirole requip XL 4mg TB24       1         Saw palmetto sernoa repens 1 1 1         Tamsulosin flomax 0.4mg 1               Terbinafine lamisil 250mg ?taking           Timolol timoptic 0.5% ophthalmic solution Both eyes               Trihexyphenidyl artane 2mg  ? taking                                  Bladder medication once daily blue round pill                                         History obtained from patient        Coding statement:   Duration of  Services: patient care and care coordination was 0 minutes  Greater than 50% of this visit was spent in counseling and coordination of care.      Edison Villasenor MD      ______________________________________     Last visit date and details:       PATIENT: Eamon Whalen  62 year old male       : 1955      MERCEDES: May 22, 2018      Parkinson      rytary seems to be working in regards to tremor control except with stress  He has had some balance problems  He has leg tremors bilateral but more on the left than right side.       He came by blue ride - transportation issue      PSA elevation had two prostate biopsies and was negative  And is continued to be followed by urology Dr. Goldberg  He had a flow study. He has nocturia still.  He is on flomax which has not helped.  He is on saw palmetto 3 times per day - dose of this is unknown.   Seeing Dr. Goldberg urology and was started  on a new medication but cannot recall the name of the medication but it is a blue round pill taken once daily       Thinks someone is looking over his shoulder.   He is worrying about his family - states he had to put his will and estate plan together  His daughter Marlin lives in [place] (and is 35) and states she will do a good job. She has two sons: Edwar and Kam      He has had freezing.   He continues to have balance problems  He tries to remain active in his home.       He had freezing when visiting his daughter  He has more freezing in small spaces.       Will be getting a new mattress  He has been sleeping on a box spring  He will try to get a bathroom on the main floor so that he does not have to do stairs.           Medications     9am 3pm 9pm         Aspirin 325mg 1               Carbidopa-levodopa ER rytary 145mg 3 3 3         Fluocinolone acetonide derma-smoothe/FS Body 0.01% oil                  Ketoconazole nizoral 2% cream                  Ketoconazole nizoral 2% shampoo                  Latanoprost xalatan 0.005% opthalmic solution       Both eyes         Ropinirole requip XL 4mg TB24       1         Saw palmetto sernoa repens 1 1 1         Tamsulosin flomax 0.4mg 1               Timolol timoptic 0.5% ophthalmic solution Both eyes                                                       Bladder medication once daily blue round pill                                                                                      History obtained from patient       He does not have clear wearing off from the rytary  He has some fogginess.           PLAN  Dermatology consultation in [place] or [place] - may need light therapy or something as his skin problems continue      Seeing Sheree Neumann today.  May need confirmation on his bladder medication from [facility] pharmacy  [phone]        Consider med rytary adjustment, etc.       Return 6 months.           Coding statement:   Duration of  Services:  "patient care and care coordination was 25 minutes  Greater than 50% of this visit was spent in counseling and coordination of care.      Edison Villasenor MD            Eamon Whalen is a 62 year old male coming in for a follow-up visit for Medication Therapy Management.  He was referred to me from Dr. Villasenor. He also met with Dr. Villasenor today.      Chief Complaint: Follow up from our visit on 2/20/18  Tobacco: cigar occasionally  Alcohol: 1-3 beverages / week      Medication Adherence/Access: no issues reported      Parkinson's Disease:  Current medications include: Rytary 36. mg 3 capsules three times daily (9 am, 3 pm, and 9 pm) and ropinirole XL 4 mg daily. Patient states he sleeps \"solid\" until 3-3:30 am and then gets up to go to the bathroom and getting back to sleep after that is difficult. He has been more active recently walking outside - pushed a  for 2 hours last week, though this was \"too much.\" He states that he is still having muscle tightening and freezing during the day. Physical activity causes more muscle tightness. For the most part when his legs are relaxed, he doesn't have tremor. He endorses some hallucinations and will sometimes talk with the people he sees (it comes and goes, multiple times a week). When he goes to the grocery he feels somebody behind him. He also has been looking for a more comfortable chair to use at home because he gets so stiff in many chairs. Patient endorses more slowness and freezing recently too.      BPH: Current medications include tamsulosin 0.4 mg, finasteride 5 mg once daily, saw palmetto 160 mg three times daily (only for the past month). He states \"my dad had success with saw palmetto\" so he would like to continue this. He does not report noticing any improvement in urination since stating tamsulosin and finasteride about 2 months ago. He will be following up with his urologist later this month. He had a prostate biopsy which was negative and has another " one coming up.      Seborrhea: Patient is using a ketoconazole creams and shampoos but feels his skin continues to worsen. During his visit with Dr. Villasenor today, Dr. Villasenor decided to refer him to dermatology.      ASCVD prevention: Patient is currently taking aspirin 325 mg for prevention. He denies bruising/bleeding. He does not have a history of stroke or MI.      Current labs include:        BP Readings from Last 3 Encounters:   05/22/18 132/81   02/20/18 (!) 168/100   11/21/17 141/78       ASSESSMENT:      Current medications were reviewed today as discussed above.        Medication Adherence: excellent, no issues identified      Parkinson's Disease:  Needs improvement. Patient may benefit from a higher dose of Ryary, as discussed with Dr. Villasenor. After on a higher dose of Rytary, he may benefit from tapering down the ropinirole, which may be contributing to hallucinations. Would not treat the hallucinations at this time since his motor function is not well controlled and prefer to make one change at a time. The hallucinations are also not currently dangerous or too bothersome to the patient.      BPH: Needs improvement. Patient would benefit from follow up with urology. Finasteride may take months for efficacy but tamsulosin should be effective by now and he may need to take an alternate medication.      Seborrhea: Needs improvement. Patient would benefit from seeing dermatology.      ASCVD prevention: Needs improvement. Patient would benefit from lower dose of aspirin as he does not have an indication for the 325 mg dose and it may pose more bleeding risk than benefit.      PLAN:          1. Increase Rytary to 195 mg capsules - take 3 capsules 3 times daily.  2. Patient to follow up with urologist (Dr. Goldberg) to discuss BPH.  3. Patient to see dermatology (referral placed by Dr. Villasenor today).  4. Patient to decrease aspirin to 81 mg daily.      I spent 30 minutes with this patient today. All changes were  made via collaborative practice agreement with Dr. Edison Villasenor. A copy of the visit note was provided to the movement disorder specialist, Dr. Villasenor.       Will follow up in 3 months (returning to see Dr. Villasenor 8/21/18)      I concur with the note as dictated above which reflects our joint assessment and plan.   Hannah Melo, [place]          The patient was given a summary of these recommendations as an after visit summary.      Sheree Neumann, Pharm.D.  Medication Therapy Management Resident  Phone: [phone]  Pager: [phone]          ______________________________________      Patient was asked about 14 Review of systems including changes in vision (dry eyes, double vision), hearing, heart, lungs, musculoskeletal, depression, anxiety, snoring, RBD, insomnia, urinary frequency, urinary urgency, constipation, swallowing problems, hematological, ID, allergies, skin problems: seborrhea, endocrinological: thyroid, diabetes, cholesterol; balance, weight changes, and other neurological problems and these were not significant at this time except for   Patient Active Problem List   Diagnosis     Paralysis agitans (H)     Wears glasses     Balance problems     Constipation     GERD (gastroesophageal reflux disease)     Urinary urgency     Insomnia     Seborrhea     Lumbago     Hearing loss     Family history of Parkinson's disease     Central retinal vein occlusion     Choroidal nevus     Vision problem -- Right Eye     ED (erectile dysfunction)     Other specified glaucoma     Abnormal electrocardiogram     Bifascicular block     Prolonged Q-T interval on ECG     History of EMG     Encounter for examination for driving license     Parkinson disease (H)     Elevated prostate specific antigen (PSA)     Family history of epilepsy and other diseases of the nervous system     Gastroesophageal reflux disease     Impotence of organic origin     Other abnormalities of gait and mobility     Personal history of other medical  treatment (CODE)     Benign neoplasm of choroid     Weakness          Allergies   Allergen Reactions     Cats      Dogs      Past Surgical History:   Procedure Laterality Date     NO HISTORY OF SURGERY       Past Medical History:   Diagnosis Date     Abnormal electrocardiogram 3/23/2017     Balance problems      Bifascicular block 3/23/2017     Central retinal vein occlusion 2014     Choroidal nevus 2014     Constipation      Elevated prostate specific antigen (PSA) 2018     Family history of Parkinson's disease      GERD (gastroesophageal reflux disease)      Glaucoma 2015     Hearing loss      History of EMG 2018    [facility]  Nerve Conduction & EMG Report  Patient:       Eamon Whalen Patient ID:    [id number] Gender:        Male YOB: 1955 Age:           62 Years 2 Months    History & Examination:  62 year old man with history of Parkinson's disease, restless leg syndrome, and paresthesias in feet and legs. Query polyneuropathy. His clinical examination      Lumbago      Parkinson's disease (H)      Prolonged Q-T interval on ECG 3/23/2017     Urinary urgency      Wears glasses      Social History     Social History     Marital status: Single     Spouse name: N/A     Number of children: N/A     Years of education: N/A     Occupational History     farm Self Employed.     Social History Main Topics     Smoking status: Never Smoker     Smokeless tobacco: Never Used      Comment: cigar sometimes     Alcohol use Yes      Comment: occ     Drug use: No     Sexual activity: Yes     Partners: Female     Other Topics Concern     Not on file     Social History Narrative    1 daughter who is  and lives in [place]    She was pregnant with the 1st child and had some problems in the 6-7 month of pregnancy with     Baby that may have hydrocephalus as there was IUGR. The baby  after 3 months in 2011. She was encouraged her to undergo  "genetic testing which was negative and they are considering having more children.     He met a woman and may  a woman from [place] - who has [place] roots. 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 He met her 3 times. Her name is Tavia and lives in [facility]. They will be visiting within a day or so with her mother. She is 36 yrs old. She is not going back there.     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 Depending on the status of the main house may build new house.         [place] in his home town had parkinson    Germ[place] lived on the other side of town        [facility] [place] miles.         This was way before pesticides        Maternal grandfather     Dax Sorenson - Bulgarian name     He would have to be brought out to a rocking chair    \"hardening of there arteries.         mother       Drug and lactation database from the [place] National Library of Medicine:  http://toxnet.nlm.nih.gov/cgi-bin/sis/htmlgen?LACT      B/P: Data Unavailable, T: Data Unavailable, P: Data Unavailable, R: Data Unavailable 0 lbs 0 oz  There were no vitals taken for this visit., There is no height or weight on file to calculate BMI.  Medications and Vitals not listed above were documented in the cart and reviewed by me.     Current Outpatient Prescriptions   Medication Sig Dispense Refill     aspirin 81 MG EC tablet Take 81 mg by mouth daily       carbidopa-levodopa (RYTARY) 48. MG CPCR per CR capsule Take 3 capsules by mouth 3 times daily 270 capsule 11     finasteride (PROSCAR) 5 MG tablet Take 5 mg by mouth daily       latanoprost (XALATAN) 0.005 % ophthalmic solution One drop in both eyes @ 9pm 2.5 mL 1     mometasone (ELOCON) 0.1 % cream   2     QUEtiapine (SEROQUEL) 25 MG tablet Take one-half tablet by mouth nightly. May increase to one tablet nightly after 1-2 weeks if not " effective. 30 tablet 5     rOPINIRole HCl (REQUIP) 2 MG TB24 tablet Take 1 tablet (2 mg) by mouth At Bedtime (dose reduction to start in two weeks, on 8/28/18) 30 tablet 5     Saw Palmetto, Serenoa repens, 450 MG CAPS Take 1 capsule by mouth 3 times daily       tamsulosin (FLOMAX) 0.4 MG capsule 0.4mg tab by mouth daily @ 9am 90 capsule 3     terbinafine (LAMISIL) 1 % cream Apply topically 2 times daily       timolol (TIMOPTIC) 0.5 % ophthalmic solution 1 drop both eyes daily @ 9am 1 Bottle 11         Edison Villasenor MD

## 2018-11-08 ENCOUNTER — OFFICE VISIT (OUTPATIENT)
Dept: PHARMACY | Facility: CLINIC | Age: 63
End: 2018-11-08
Payer: COMMERCIAL

## 2018-11-08 ENCOUNTER — OFFICE VISIT (OUTPATIENT)
Dept: NEUROLOGY | Facility: CLINIC | Age: 63
End: 2018-11-08
Payer: COMMERCIAL

## 2018-11-08 VITALS
BODY MASS INDEX: 22.22 KG/M2 | WEIGHT: 150 LBS | RESPIRATION RATE: 16 BRPM | SYSTOLIC BLOOD PRESSURE: 167 MMHG | OXYGEN SATURATION: 99 % | TEMPERATURE: 98 F | HEART RATE: 74 BPM | DIASTOLIC BLOOD PRESSURE: 104 MMHG | HEIGHT: 69 IN

## 2018-11-08 DIAGNOSIS — B35.1 TOENAIL FUNGUS: ICD-10-CM

## 2018-11-08 DIAGNOSIS — G20.A1 PARKINSON'S DISEASE (H): Primary | ICD-10-CM

## 2018-11-08 DIAGNOSIS — G20.A1 PARALYSIS AGITANS (H): ICD-10-CM

## 2018-11-08 DIAGNOSIS — G20.A1 PARALYSIS AGITANS (H): Primary | ICD-10-CM

## 2018-11-08 DIAGNOSIS — L21.9 SEBORRHEA: ICD-10-CM

## 2018-11-08 PROCEDURE — 99606 MTMS BY PHARM EST 15 MIN: CPT | Performed by: PHARMACIST

## 2018-11-08 RX ORDER — MOMETASONE FUROATE 1 MG/G
CREAM TOPICAL
Refills: 2 | COMMUNITY
Start: 2018-11-08 | End: 2019-03-28

## 2018-11-08 RX ORDER — QUETIAPINE FUMARATE 25 MG/1
TABLET, FILM COATED ORAL
Qty: 90 TABLET | Refills: 5 | COMMUNITY
Start: 2018-11-08 | End: 2020-07-22

## 2018-11-08 RX ORDER — FLUOCINOLONE ACETONIDE 0.11 MG/ML
OIL AURICULAR (OTIC)
COMMUNITY
Start: 2018-11-08 | End: 2018-11-08

## 2018-11-08 RX ORDER — FINASTERIDE 5 MG/1
TABLET, FILM COATED ORAL
Qty: 30 TABLET | Refills: 3 | COMMUNITY
Start: 2018-11-08 | End: 2021-01-29

## 2018-11-08 RX ORDER — KETOCONAZOLE 20 MG/G
CREAM TOPICAL DAILY
Qty: 60 G | Refills: 11 | COMMUNITY
Start: 2018-11-08 | End: 2020-10-27

## 2018-11-08 RX ORDER — ROPINIROLE 2 MG/1
TABLET, FILM COATED, EXTENDED RELEASE ORAL
Qty: 30 TABLET | Refills: 11 | COMMUNITY
Start: 2018-11-08 | End: 2018-11-08

## 2018-11-08 RX ORDER — FLUOCINOLONE ACETONIDE 0.11 MG/ML
OIL TOPICAL
Qty: 1 BOTTLE | Refills: 11 | COMMUNITY
Start: 2018-11-08 | End: 2018-11-08

## 2018-11-08 RX ORDER — ASPIRIN 325 MG
TABLET, DELAYED RELEASE (ENTERIC COATED) ORAL
COMMUNITY
Start: 2018-11-08 | End: 2019-03-28

## 2018-11-08 RX ORDER — ROPINIROLE 2 MG/1
TABLET, FILM COATED, EXTENDED RELEASE ORAL
Qty: 90 TABLET | Refills: 11 | Status: SHIPPED | OUTPATIENT
Start: 2018-11-08 | End: 2019-11-10

## 2018-11-08 RX ORDER — KETOCONAZOLE 20 MG/ML
SHAMPOO TOPICAL
Qty: 120 ML | Refills: 11 | COMMUNITY
Start: 2018-11-08 | End: 2020-10-27

## 2018-11-08 RX ORDER — FLUOCINOLONE ACETONIDE 0.11 MG/ML
OIL TOPICAL
Qty: 1 BOTTLE | Refills: 11 | Status: SHIPPED | OUTPATIENT
Start: 2018-11-08 | End: 2020-10-27

## 2018-11-08 ASSESSMENT — PAIN SCALES - GENERAL: PAINLEVEL: NO PAIN (0)

## 2018-11-08 NOTE — MR AVS SNAPSHOT
After Visit Summary   11/8/2018    Eamon Whalen    MRN: [id number]           Patient Information     Date Of Birth          1955        Visit Information        Provider Department      11/8/2018 12:40 PM Edison Villasenor MD [facility]        Today's Diagnoses     Parkinson's disease (H)    -  1    Seborrhea        Paralysis agitans (H)        Toenail fungus          Care Instructions    MERCEDES: November 8, 2018    Parkinson  icd  May need to reduce requip xl to 2mg     Had CTangio, head ct and ecg and xray of his chest.     He is on requip XL 2mg  He is on seroquel 25mg at night  He is using rytary 195mg 3 capsules 3/day  He has some tremor that is controllable    There was a question of hallucinations/confusion/delusions/etc  Details a bit murky but seems okay    He continues to have skin problems.     Medications     9am 3pm 9pm         Aspirin 325mg (may go back to 81mg) 1               Bisacodyl dulcolax 5mg EC tablet Not using            Carbidopa-levodopa ER rytary 195mg 3 3 3         Finasteride proscar 5mg 1           Fluocinolone acetonide derma-smoothe/FS Body 0.01% oil   has not tried               Ketoconazole nizoral 2% cream  not sure if using                Ketoconazole nizoral 2% shampoo                  Latanoprost xalatan 0.005% opthalmic solution       Both eyes         Mometasone elocon 0.1% cream NOT SURE IF USING            Quetiapine seroquel 25mg   1     Ropinirole requip XL 2mg TB24       1         Saw palmetto sernoa repens STOPPED           Tamsulosin flomax 0.4mg 1               Terbinafine lamisil 1% cream Not using           Timolol timoptic 0.5% ophthalmic solution Both eyes                                                                  History obtained from patient    PLAN  Podiatry referral for nails and toenail fungus  Consider going every other night of the requip XL and see if can go off it.   He is using 2mg dose now of requip XL    Continue on the  25mg of seroquel  This can be increased if needed in the future.     Continue on the rtyary 195mg 3 capsules  Times per day     Visit with Sheree Neumann, PHarm D today    Return visit.,           Follow-ups after your visit        Additional Services     PODIATRY/FOOT & ANKLE SURGERY REFERRAL       Your provider has referred you to: Podiatry    Please be aware that coverage of these services is subject to the terms and limitations of your health insurance plan.  Call member services at your health plan with any benefit or coverage questions.      Please bring the following to your appointment:  >>   Any x-rays, CTs or MRIs which have been performed.  Contact the facility where they were done to arrange for  prior to your scheduled appointment.    >>   List of current medications   >>   This referral request   >>   Any documents/labs given to you for this referral                  Follow-up notes from your care team     Return in about 6 months (around 5/8/2019).      Your next 10 appointments already scheduled     Feb 07, 2019  1:40 PM CST   (Arrive by 1:25 PM)   Return Movement Disorder with Edison Villasenor MD   [facility] ([facility])    [address] [place] [id number] [phone]            Feb 07, 2019  2:30 PM CST   (Arrive by 2:15 PM)   SHORT with Sheree Neumann RPH   [facility])    [address] [place] [id number] [phone]              Who to contact     Please call your clinic at [phone] to:    Ask questions about your health    Make or cancel appointments    Discuss your medicines    Learn about your test results    Speak to your doctor            Additional Information About Your Visit        MyChart Information     [email or web address] gives you secure access to your electronic health record. If you see a primary care provider, you can also send messages  "to your care team and make appointments. If you have questions, please call your primary care clinic.  If you do not have a primary care provider, please call [phone] and they will assist you.      [employer] is an electronic gateway that provides easy, online access to your medical records. With [employer], you can request a clinic appointment, read your test results, renew a prescription or communicate with your care team.     To access your existing account, please contact your [facility] or call [phone] for assistance.        Care EveryWhere ID     This is your Care EveryWhere ID. This could be used by other organizations to access your [facility] medical records  [id number]        Your Vitals Were     Pulse Temperature Respirations Height Pulse Oximetry BMI (Body Mass Index)    74 98  F (36.7  C) (Oral) 16 1.753 m (5' 9\") 99% 22.15 kg/m2       Blood Pressure from Last 3 Encounters:   11/08/18 (!) 167/104   05/22/18 132/81   02/20/18 (!) 168/100    Weight from Last 3 Encounters:   11/08/18 68 kg (150 lb)   05/22/18 70.3 kg (155 lb)   02/20/18 69.3 kg (152 lb 11.2 oz)              We Performed the Following     PODIATRY/FOOT & ANKLE SURGERY REFERRAL          Today's Medication Changes          These changes are accurate as of 11/8/18  1:59 PM.  If you have any questions, ask your nurse or doctor.               Start taking these medicines.        Dose/Directions    carbidopa-levodopa 48. MG Cpcr per CR capsule   Commonly known as:  RYTARY   Used for:  Paralysis agitans (H)   Started by:  Edison Villasenor MD        3 capsule by mouth 3 times per day @ 9am, 3pm and 9pm  = 9/day   Quantity:  270 capsule   Refills:  11       * ketoconazole 2 % shampoo   Commonly known as:  NIZORAL   Used for:  Paralysis agitans (H), Seborrhea   Started by:  Edison Villasenor MD        Apply to the affected area and wash off after 5 minutes.   Quantity:  120 mL   Refills:  11       * " ketoconazole 2 % cream   Commonly known as:  NIZORAL   Used for:  Seborrhea, Paralysis agitans (H)   Started by:  Edison Villasenor MD        Apply topically daily Apply topical daily as needed   Quantity:  60 g   Refills:  11       rOPINIRole HCl 2 MG Tb24 tablet   Commonly known as:  REQUIP   Used for:  Paralysis agitans (H)   Started by:  Edison Villasenor MD        2mg dose by mouth nightly @ 9pm   Quantity:  90 tablet   Refills:  11       * Notice:  This list has 2 medication(s) that are the same as other medications prescribed for you. Read the directions carefully, and ask your doctor or other care provider to review them with you.      These medicines have changed or have updated prescriptions.        Dose/Directions    aspirin 325 MG EC tablet   This may have changed:    - medication strength  - how much to take  - how to take this  - when to take this  - additional instructions   Changed by:  Edison Villasenor MD        325mg tab by mouth daily @ 9am for a while (and then will go back to 81 mg tab by mouth daily @ 9am)   Refills:  0       finasteride 5 MG tablet   Commonly known as:  PROSCAR   This may have changed:    - how much to take  - how to take this  - when to take this  - additional instructions   Changed by:  Edison Villasenor MD        5mg tab by mouth daily @ 9am   Quantity:  30 tablet   Refills:  3       mometasone 0.1 % cream   Commonly known as:  ELOCON   This may have changed:  additional instructions   Changed by:  Edison Villasenor MD        NOT SURE IF USING   Refills:  2       SEROQUEL 25 MG tablet   This may have changed:  additional instructions   Used for:  Paralysis agitans (H)   Generic drug:  QUEtiapine   Changed by:  Edison Villasenor MD        25MG BY MOUTH NIGHTLY @ 9PM.   Quantity:  90 tablet   Refills:  5         Stop taking these medicines if you haven't already. Please contact your care team if you have questions.     fluocinolone acetonide 0.01 % Oil   Stopped  by:  Edison Villasenor MD           Saw Palmetto (Serenoa repens) 450 MG Caps   Stopped by:  Edison Villasenor MD                Where to get your medicines      These medications were sent to [id number] - [place] - [address]  [address], [place] [address]     Phone:  [phone]     carbidopa-levodopa 48. MG Cpcr per CR capsule    Fluocinolone Acetonide Scalp 0.01 % Oil oil    rOPINIRole HCl 2 MG Tb24 tablet                Primary Care Provider Office Phone # Fax #    Bernardino John [phone]       [facility] [address]  [facility] [address]        Equal Access to Services     CORI MUÑOZ AH: Hadii aad ku hadasho Soomaali, waaxda luqadaha, qaybta kaalmada adeegyada, waxay idiin hayaan adecurtis lewis . So [facility] [phone].    ATENCIÓN: Si habla español, tiene a olmstead disposición servicios gratuitos de asistencia lingüística. Ll[facility] [phone].    We comply with applicable federal civil rights laws and [place] laws. We do not discriminate on the basis of race, color, national origin, age, disability, sex, sexual orientation, or gender identity.            Thank you!     Thank you for choosing [facility]  for your care. Our goal is always to provide you with excellent care. Hearing back from our patients is one way we can continue to improve our services. Please take a few minutes to complete the written survey that you may receive in the mail after your visit with us. Thank you!             Your Updated Medication List - Protect others around you: Learn how to safely use, store and throw away your medicines at www.disposemymeds.org.          This list is accurate as of 11/8/18  1:59 PM.  Always use your most recent med list.                   Brand Name Dispense Instructions for use Diagnosis    aspirin 325 MG EC tablet      325mg tab by mouth daily @ 9am for a while (and then will go back to 81  mg tab by mouth daily @ 9am)        carbidopa-levodopa 48. MG Cpcr per CR capsule    RYTARY    270 capsule    3 capsule by mouth 3 times per day @ 9am, 3pm and 9pm  = 9/day    Paralysis agitans (H)       finasteride 5 MG tablet    PROSCAR    30 tablet    5mg tab by mouth daily @ 9am        Fluocinolone Acetonide Scalp 0.01 % Oil oil     1 Bottle    Wet hair and apply to scalp and leave in for 4 hours and then wash off. May use 1 times per week    Seborrhea       * ketoconazole 2 % shampoo    NIZORAL    120 mL    Apply to the affected area and wash off after 5 minutes.    Paralysis agitans (H), Seborrhea       * ketoconazole 2 % cream    NIZORAL    60 g    Apply topically daily Apply topical daily as needed    Seborrhea, Paralysis agitans (H)       mometasone 0.1 % cream    ELOCON     NOT SURE IF USING        rOPINIRole HCl 2 MG Tb24 tablet    REQUIP    90 tablet    2mg dose by mouth nightly @ 9pm    Paralysis agitans (H)       SEROQUEL 25 MG tablet   Generic drug:  QUEtiapine     90 tablet    25MG BY MOUTH NIGHTLY @ 9PM.    Paralysis agitans (H)       tamsulosin 0.4 MG capsule    FLOMAX    90 capsule    0.4mg tab by mouth daily @ 9am        terbinafine 1 % cream    lamISIL     Apply topically 2 times daily        timolol 0.5 % ophthalmic solution    TIMOPTIC    1 Bottle    1 drop both eyes daily @ 9am        XALATAN 0.005 % ophthalmic solution   Generic drug:  latanoprost     2.5 mL    One drop in both eyes @ 9pm        * Notice:  This list has 2 medication(s) that are the same as other medications prescribed for you. Read the directions carefully, and ask your doctor or other care provider to review them with you.

## 2018-11-08 NOTE — LETTER
2018      RE: Eamon Whalen  [address]  [place] [address]       Diagnosis/Summary/Recommendations:    PATIENT: Eamon Whalen  62 year old male     : 1955    MERCEDES: 2018    Parkinson  icd  May need to reduce requip xl to 2mg     Had CTangio, head ct and ecg and xray of his chest.     He is on requip XL 2mg  He is on seroquel 25mg at night  He is using rytary 195mg 3 capsules 3/day  He has some tremor that is controllable    There was a question of hallucinations/confusion/delusions/etc  Details a bit murky but seems okay    He continues to have skin problems.     Medications     9am 3pm 9pm         Aspirin 325mg (may go back to 81mg) 1               Bisacodyl dulcolax 5mg EC tablet Not using            Carbidopa-levodopa ER rytary 195mg 3 3 3         Finasteride proscar 5mg 1           Fluocinolone acetonide derma-smoothe/FS Body 0.01% oil   has not tried               Ketoconazole nizoral 2% cream  not sure if using                Ketoconazole nizoral 2% shampoo                  Latanoprost xalatan 0.005% opthalmic solution       Both eyes         Mometasone elocon 0.1% cream NOT SURE IF USING            Quetiapine seroquel 25mg   1     Ropinirole requip XL 2mg TB24       1         Saw palmetto sernoa repens STOPPED           Tamsulosin flomax 0.4mg 1               Terbinafine lamisil 1% cream Not using           Timolol timoptic 0.5% ophthalmic solution Both eyes                                                                  History obtained from patient    PLAN  Podiatry referral for nails and toenail fungus  Consider going every other night of the requip XL and see if can go off it.   He is using 2mg dose now of requip XL    Continue on the 25mg of seroquel  This can be increased if needed in the future.     Continue on the rtyary 195mg 3 capsules  Times per day     Visit with Sheree Neumann, PHarm D today    Return visit.,       Coding statement:   Duration of  Services: patient  care and care coordination was 25 minutes  Greater than 50% of this visit was spent in counseling and coordination of care.     Edison Villasenor MD     ______________________________________    Last visit date and details:     CT Neck Angiogram with IV Bezgywla66/3/2018  [facility]  Result Impression   IMPRESSION:   No vascular abnormality identified.   Result Narrative   EXAM: CT NECK ANGIOGRAM WITH IV CONTRAST   3D/MIPS: 3D Post-Processing performed on a dependent workstation.     COMPARISON: None    FINDINGS:     Right carotid system: Unremarkable     Left carotid system: Unremarkable    Vertebral arteries: Unremarkable    Aortic arch: Unremarkable    Intracranial: Unremarkable    Nonvascular findings: None    Measurement of a carotid stenosis, if present, is based on length parameters  that compare the residual internal carotid luminal diameter with that of the  normal distal ICA in accordance with [employer] American Symptomatic Carotid  Endarterectomy Trial (NASCET).   Other Result Information   Interface, [facility] Generic [id number] - 11/03/2018  7:13 PM CDT  EXAM: CT NECK ANGIOGRAM WITH IV CONTRAST   3D/MIPS: 3D Post-Processing performed on a dependent workstation.     COMPARISON: None    FINDINGS:     Right carotid system: Unremarkable     Left carotid system: Unremarkable    Vertebral arteries: Unremarkable    Aortic arch: Unremarkable    Intracranial: Unremarkable    Nonvascular findings: None    Measurement of a carotid stenosis, if present, is based on length parameters  that compare the residual internal carotid luminal diameter with that of the  normal distal ICA in accordance with North American Symptomatic Carotid  Endarterectomy Trial (NASCET).      IMPRESSION:   No vascular abnormality identified.   Status       2018 November  DX Chest Portable 1 View11/3/2018  [facility]  Result Impression   IMPRESSION: AP chest demonstrates a nodular opacity right midlung unchanged from  7/3/2015. Chest  otherwise negative and unchanged.   Result Narrative   EXAM: DX CHEST PORTABLE 1 VIEW   Other Result Information   Interface, [facility] Generic [id number] - 2018  7:25 PM CDT  EXAM: DX CHEST PORTABLE 1 VIEW      IMPRESSION: AP chest demonstrates a nodular opacity right midlung unchanged from  7/3/2015. Chest otherwise negative and unchanged.     CT Head without IV Fmjcyfkc10/3/2018  [facility]  Result Impression   IMPRESSION:   No acute findings.   Result Narrative   EXAM: CT HEAD WITHOUT IV CONTRAST    COMPARISON: CT head 3/23/2015    FINDINGS: CT examination the head performed without contrast demonstrates no  evidence of intracranial mass, hemorrhage, or cerebral infarction. Visualized  sinuses and mastoids are clear.   Other Result Information   Interface, [facility] Generic [id number] - 2018  6:30 PM CDT  EXAM: CT HEAD WITHOUT IV CONTRAST    COMPARISON: CT head 3/23/2015    FINDINGS: CT examination the head performed without contrast demonstrates no  evidence of intracranial mass, hemorrhage, or cerebral infarction. Visualized  sinuses and mastoids are clear.      IMPRESSION:   No acute findings.     ECG 12 Lead11/3/2018  [facility]  Component Name Value Ref Range   Ventricular Rate ECG/Min 65 BPM   IN Interval 172 ms   QRSD Interval 144 ms   QT Interval 444 ms   QTC Interval 461 ms   P Axis 70 degrees   R Axis 76 degrees   T Wave Axis 56 degrees   Result Impression   Normal sinus rhythm  Right bundle branch block  When compared with ECG of 27-MAR-2018 12:21,  Nonspecific T wave abnormality no longer evident in Inferior leads   Other Result Information   Interface, [facility] [place] [id number] - 2018  5:36 PM CDT  IMPRESSION:  Normal sinus rhythm  Right bundle branch block  When compared with ECG of 27-MAR-2018 12:21,  Nonspecific T wave abnormality           : 1955     MERCEDES:      No show     2018        Medications     9am 3pm 9pm         Aspirin 325mg 1                Bisacodyl dulcolax 5mg EC tablet             Carbidopa-levodopa ER rytary 195mg 3 3 3         Carbidopa/levodopa Sinemet CR 50/200 Not taking           CArbidopa/levodopa sinemet 25/100 Not taking           Ciprofloxaxin cipro 500mg Not taking           Finasteride proscar 5mg ?taking           Fluocinolone acetonide derma-smoothe/FS Body 0.01% oil                  Gavilyte-G 236 g suspension ?taking           Ketoconazole nizoral 2% cream                  Ketoconazole nizoral 2% shampoo                  Latanoprost xalatan 0.005% opthalmic solution       Both eyes         Mometasone elocon 0.1% cream             Ropinirole requip 2mg tablet     1?       Ropinirole requip XL 4mg TB24       1         Saw palmetto sernoa repens 1 1 1         Tamsulosin flomax 0.4mg 1               Terbinafine lamisil 250mg ?taking           Timolol timoptic 0.5% ophthalmic solution Both eyes               Trihexyphenidyl artane 2mg  ? taking                                  Bladder medication once daily blue round pill                                         History obtained from patient        Coding statement:   Duration of  Services: patient care and care coordination was 0 minutes  Greater than 50% of this visit was spent in counseling and coordination of care.      Edison Villasenor MD      ______________________________________     Last visit date and details:       PATIENT: Eamon Whalen  62 year old male       : 1955      MERCEDES: May 22, 2018      Parkinson      rytary seems to be working in regards to tremor control except with stress  He has had some balance problems  He has leg tremors bilateral but more on the left than right side.       He came by blue ride - transportation issue      PSA elevation had two prostate biopsies and was negative  And is continued to be followed by urology Dr. Goldberg  He had a flow study. He has nocturia still.  He is on flomax which has not helped.  He is on saw palmetto 3 times  per day - dose of this is unknown.   Seeing Dr. Goldberg urology and was started on a new medication but cannot recall the name of the medication but it is a blue round pill taken once daily       Thinks someone is looking over his shoulder.   He is worrying about his family - states he had to put his will and estate plan together  His daughter Marlin lives in [place] (and is 35) and states she will do a good job. She has two sons: dEwar and Kam      He has had freezing.   He continues to have balance problems  He tries to remain active in his home.       He had freezing when visiting his daughter  He has more freezing in small spaces.       Will be getting a new mattress  He has been sleeping on a box spring  He will try to get a bathroom on the main floor so that he does not have to do stairs.           Medications     9am 3pm 9pm         Aspirin 325mg 1               Carbidopa-levodopa ER rytary 145mg 3 3 3         Fluocinolone acetonide derma-smoothe/FS Body 0.01% oil                  Ketoconazole nizoral 2% cream                  Ketoconazole nizoral 2% shampoo                  Latanoprost xalatan 0.005% opthalmic solution       Both eyes         Ropinirole requip XL 4mg TB24       1         Saw palmetto sernoa repens 1 1 1         Tamsulosin flomax 0.4mg 1               Timolol timoptic 0.5% ophthalmic solution Both eyes                                                       Bladder medication once daily blue round pill                                                                                      History obtained from patient       He does not have clear wearing off from the rytary  He has some fogginess.           PLAN  Dermatology consultation in [place] or [place] - may need light therapy or something as his skin problems continue      Seeing Sheree Neumann today.  May need confirmation on his bladder medication from [facility] pharmacy  [phone]        Consider med rytary adjustment,  "etc.       Return 6 months.           Coding statement:   Duration of  Services: patient care and care coordination was 25 minutes  Greater than 50% of this visit was spent in counseling and coordination of care.      Edison Villasenor MD            Eamon Whalen is a 62 year old male coming in for a follow-up visit for Medication Therapy Management.  He was referred to me from Dr. Villasenor. He also met with Dr. Villasenor today.      Chief Complaint: Follow up from our visit on 2/20/18  Tobacco: cigar occasionally  Alcohol: 1-3 beverages / week      Medication Adherence/Access: no issues reported      Parkinson's Disease:  Current medications include: Rytary 36. mg 3 capsules three times daily (9 am, 3 pm, and 9 pm) and ropinirole XL 4 mg daily. Patient states he sleeps \"solid\" until 3-3:30 am and then gets up to go to the bathroom and getting back to sleep after that is difficult. He has been more active recently walking outside - pushed a  for 2 hours last week, though this was \"too much.\" He states that he is still having muscle tightening and freezing during the day. Physical activity causes more muscle tightness. For the most part when his legs are relaxed, he doesn't have tremor. He endorses some hallucinations and will sometimes talk with the people he sees (it comes and goes, multiple times a week). When he goes to the grocery he feels somebody behind him. He also has been looking for a more comfortable chair to use at home because he gets so stiff in many chairs. Patient endorses more slowness and freezing recently too.      BPH: Current medications include tamsulosin 0.4 mg, finasteride 5 mg once daily, saw palmetto 160 mg three times daily (only for the past month). He states \"my dad had success with saw palmetto\" so he would like to continue this. He does not report noticing any improvement in urination since stating tamsulosin and finasteride about 2 months ago. He will be following up with his " urologist later this month. He had a prostate biopsy which was negative and has another one coming up.      Seborrhea: Patient is using a ketoconazole creams and shampoos but feels his skin continues to worsen. During his visit with Dr. Villasenor today, Dr. Villasenor decided to refer him to dermatology.      ASCVD prevention: Patient is currently taking aspirin 325 mg for prevention. He denies bruising/bleeding. He does not have a history of stroke or MI.      Current labs include:        BP Readings from Last 3 Encounters:   05/22/18 132/81   02/20/18 (!) 168/100   11/21/17 141/78       ASSESSMENT:      Current medications were reviewed today as discussed above.        Medication Adherence: excellent, no issues identified      Parkinson's Disease:  Needs improvement. Patient may benefit from a higher dose of Ryary, as discussed with Dr. Villasenor. After on a higher dose of Rytary, he may benefit from tapering down the ropinirole, which may be contributing to hallucinations. Would not treat the hallucinations at this time since his motor function is not well controlled and prefer to make one change at a time. The hallucinations are also not currently dangerous or too bothersome to the patient.      BPH: Needs improvement. Patient would benefit from follow up with urology. Finasteride may take months for efficacy but tamsulosin should be effective by now and he may need to take an alternate medication.      Seborrhea: Needs improvement. Patient would benefit from seeing dermatology.      ASCVD prevention: Needs improvement. Patient would benefit from lower dose of aspirin as he does not have an indication for the 325 mg dose and it may pose more bleeding risk than benefit.      PLAN:          1. Increase Rytary to 195 mg capsules - take 3 capsules 3 times daily.  2. Patient to follow up with urologist (Dr. Goldberg) to discuss BPH.  3. Patient to see dermatology (referral placed by Dr. Villasenor today).  4. Patient to decrease  aspirin to 81 mg daily.      I spent 30 minutes with this patient today. All changes were made via collaborative practice agreement with Dr. Edison Villasenor. A copy of the visit note was provided to the movement disorder specialist, Dr. Villasenor.       Will follow up in 3 months (returning to see Dr. Villasenor 8/21/18)      I concur with the note as dictated above which reflects our joint assessment and plan.   Hannah Melo PharmD          The patient was given a summary of these recommendations as an after visit summary.      Sheree Neumann, Pharm.D.  Medication Therapy Management Resident  Phone: [phone]  Pager: [phone]          ______________________________________      Patient was asked about 14 Review of systems including changes in vision (dry eyes, double vision), hearing, heart, lungs, musculoskeletal, depression, anxiety, snoring, RBD, insomnia, urinary frequency, urinary urgency, constipation, swallowing problems, hematological, ID, allergies, skin problems: seborrhea, endocrinological: thyroid, diabetes, cholesterol; balance, weight changes, and other neurological problems and these were not significant at this time except for   Patient Active Problem List   Diagnosis     Paralysis agitans (H)     Wears glasses     Balance problems     Constipation     GERD (gastroesophageal reflux disease)     Urinary urgency     Insomnia     Seborrhea     Lumbago     Hearing loss     Family history of Parkinson's disease     Central retinal vein occlusion     Choroidal nevus     Vision problem -- Right Eye     ED (erectile dysfunction)     Other specified glaucoma     Abnormal electrocardiogram     Bifascicular block     Prolonged Q-T interval on ECG     History of EMG     Encounter for examination for driving license     Parkinson disease (H)     Elevated prostate specific antigen (PSA)     Family history of epilepsy and other diseases of the nervous system     Gastroesophageal reflux disease     Impotence of organic  origin     Other abnormalities of gait and mobility     Personal history of other medical treatment (CODE)     Benign neoplasm of choroid     Weakness          Allergies   Allergen Reactions     Cats      Dogs      Past Surgical History:   Procedure Laterality Date     NO HISTORY OF SURGERY       Past Medical History:   Diagnosis Date     Abnormal electrocardiogram 3/23/2017     Balance problems      Bifascicular block 3/23/2017     Central retinal vein occlusion 4/11/2014     Choroidal nevus 4/11/2014     Constipation      Elevated prostate specific antigen (PSA) 2/20/2018     Family history of Parkinson's disease      GERD (gastroesophageal reflux disease)      Glaucoma 6/4/2015     Hearing loss      History of EMG 2/20/2018    [facility]  Nerve Conduction & EMG Report  Patient:       Eamon Whalen Patient ID:    [id number] Gender:        Male YOB: 1955 Age:           62 Years 2 Months    History & Examination:  62 year old man with history of Parkinson's disease, restless leg syndrome, and paresthesias in feet and legs. Query polyneuropathy. His clinical examination      Lumbago      Parkinson's disease (H)      Prolonged Q-T interval on ECG 3/23/2017     Urinary urgency      Wears glasses      Social History     Social History     Marital status: Single     Spouse name: N/A     Number of children: N/A     Years of education: N/A     Occupational History     farm Self Employed.     Social History Main Topics     Smoking status: Never Smoker     Smokeless tobacco: Never Used      Comment: cigar sometimes     Alcohol use Yes      Comment: occ     Drug use: No     Sexual activity: Yes     Partners: Female     Other Topics Concern     Not on file     Social History Narrative    1 daughter who is  and lives in [place]    She was pregnant with the 1st child and had some problems in the 6-7 month of pregnancy with     Baby that may have hydrocephalus as there  "was IUGR. The baby  after 3 months in 2011. She was encouraged her to undergo genetic testing which was negative and they are considering having more children.     He met a woman and may  a woman from [place] - who has Citizen of [place] roots. 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 He met her 3 times. Her name is Tavia and lives in [facility]. They will be visiting within a day or so with her mother. She is 36 yrs old. She is not going back there.     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 Depending on the status of the main house may build new house.         [place] in his home town had parkinson    Germ[place] lived on the other side of town        [facility] -- [place] miles.         This was way before pesticides        Maternal grandfather     Dax Sorenson - [place] name     He would have to be brought out to a rocking chair    \"hardening of there arteries.         mother       Drug and lactation database from the [place] National Library of Medicine:  http://toxnet.nlm.nih.gov/cgi-bin/sis/htmlgen?LACT      B/P: Data Unavailable, T: Data Unavailable, P: Data Unavailable, R: Data Unavailable 0 lbs 0 oz  There were no vitals taken for this visit., There is no height or weight on file to calculate BMI.  Medications and Vitals not listed above were documented in the cart and reviewed by me.     Current Outpatient Prescriptions   Medication Sig Dispense Refill     aspirin 81 MG EC tablet Take 81 mg by mouth daily       carbidopa-levodopa (RYTARY) 48. MG CPCR per CR capsule Take 3 capsules by mouth 3 times daily 270 capsule 11     finasteride (PROSCAR) 5 MG tablet Take 5 mg by mouth daily       latanoprost (XALATAN) 0.005 % ophthalmic solution One drop in both eyes @ 9pm 2.5 mL 1     mometasone (ELOCON) 0.1 % cream   2     QUEtiapine (SEROQUEL) 25 MG tablet Take " one-half tablet by mouth nightly. May increase to one tablet nightly after 1-2 weeks if not effective. 30 tablet 5     rOPINIRole HCl (REQUIP) 2 MG TB24 tablet Take 1 tablet (2 mg) by mouth At Bedtime (dose reduction to start in two weeks, on 8/28/18) 30 tablet 5     Saw Palmetto, Serenoa repens, 450 MG CAPS Take 1 capsule by mouth 3 times daily       tamsulosin (FLOMAX) 0.4 MG capsule 0.4mg tab by mouth daily @ 9am 90 capsule 3     terbinafine (LAMISIL) 1 % cream Apply topically 2 times daily       timolol (TIMOPTIC) 0.5 % ophthalmic solution 1 drop both eyes daily @ 9am 1 Bottle 11         Edison Villasenor MD

## 2018-11-08 NOTE — Clinical Note
11/8/2018       RE: Eamon Whalen  [address]  [place] [address]     Dear Colleague,    Thank you for referring your patient, Eamon Whalen, to the [facility]. Please see a copy of my visit note below.    No notes on file    Again, thank you for allowing me to participate in the care of your patient.      Sincerely,    Edison Villasenor MD

## 2018-11-08 NOTE — NURSING NOTE
Chief Complaint   Patient presents with     Parkinson     [facility] RETURN MOVEMENT DISORDER RMO     Rocco Nelson, EMT

## 2018-11-08 NOTE — PROGRESS NOTES
SUBJECTIVE/OBJECTIVE:                Eamon Whalen is a 62 year old male coming in for a follow-up visit for Medication Therapy Management.  He was referred to me from Dr. Villasenor and is also seeing Dr. Villasenor today in clinic.     Chief Complaint: Follow up from our visit on 10/2/18  Tobacco: cigar occasionally  Alcohol: 1-3 beverages / week    Medication Adherence/Access: no issues reported    Parkinson's Disease:  Current medications include: Rytary 36. mg 3 capsules 3 times daily (9 am, 3 pm, and 9 pm) and ropinirole XL 2 mg daily. He also takes quetiapine 25 mg nightly. Patient reports that symptoms of PD are improved. He states he has been able to walk better with his walker and his hallucinations have essentially resolved lately. He is hesitant to reduce the ropinirole any further because previously his gait worsened without ropinirole. Patient is also pleased that he has improved his relationship with his daughter without recent hallucinations and he has been able to work with our .      ASSESSMENT:              Current medications were reviewed today as discussed above.      Medication Adherence: excellent, no issues identified    Parkinson's Disease: Stable. Would prefer to not change his PD regimen at this time given that his motor symptoms and psychosis are stable. Could consider reducing ropinirole in the future if hallucinations worsen again.     PLAN:                  No medication changes recommended.    I spent 20 minutes with this patient today. All changes were made via collaborative practice agreement with Dr. Villasenor. A copy of the visit note was provided to the patient's referring provider.     Will follow up in 3 months: 2/7/19.    The patient declined a summary of these recommendations as an after visit summary.    Sheree Neumann, Pharm.D.  Medication Therapy Management Pharmacist  Phone: [phone]

## 2018-11-08 NOTE — MR AVS SNAPSHOT
After Visit Summary   11/8/2018    Eamon Whalen    MRN: [id number]           Patient Information     Date Of Birth          1955        Visit Information        Provider Department      11/8/2018 1:30 PM Sheree Neumann RPH [facility]        Today's Diagnoses     Paralysis agitans (H)    -  1       Follow-ups after your visit        Follow-up notes from your care team     Return in 3 months (on 2/7/2019) for Medication Therapy Management.      Your next 10 appointments already scheduled     Feb 07, 2019  1:40 PM CST   (Arrive by 1:25 PM)   Return Movement Disorder with Edison Villasenor MD   [facility] ([facility])    [address] [place] [id number] [phone]            Feb 07, 2019  2:30 PM CST   (Arrive by 2:15 PM)   SHORT with Sheree Neumann RPH   [facility] ([facility])    [address] [place] [id number] [phone]              Who to contact     If you have questions or need follow up information about today's clinic visit or your schedule please contact [facility] directly at [phone].  Normal or non-critical lab and imaging results will be communicated to you by MyChart, letter or phone within 4 business days after the clinic has received the results. If you do not hear from us within 7 days, please contact the clinic through MyChart or phone. If you have a critical or abnormal lab result, we will notify you by phone as soon as possible.  Submit refill requests through [email or web address] or call your pharmacy and they will forward the refill request to us. Please allow 3 business days for your refill to be completed.          Additional Information About Your Visit        [facility] Information     [email or web address] gives you secure access to your electronic health record. If you see a primary care provider, you can also send  messages to your care team and make appointments. If you have questions, please call your primary care clinic.  If you do not have a primary care provider, please call [phone] and they will assist you.        Care EveryWhere ID     This is your Care EveryWhere ID. This could be used by other organizations to access your [place] medical records  [id number]         Blood Pressure from Last 3 Encounters:   11/08/18 (!) 167/104   05/22/18 132/81   02/20/18 (!) 168/100    Weight from Last 3 Encounters:   11/08/18 150 lb (68 kg)   05/22/18 155 lb (70.3 kg)   02/20/18 152 lb 11.2 oz (69.3 kg)              Today, you had the following     No orders found for display         Today's Medication Changes          These changes are accurate as of 11/8/18  5:54 PM.  If you have any questions, ask your nurse or doctor.               Start taking these medicines.        Dose/Directions    carbidopa-levodopa 48. MG Cpcr per CR capsule   Commonly known as:  RYTARY   Used for:  Paralysis agitans (H)   Started by:  Edison Villasenor MD        3 capsule by mouth 3 times per day @ 9am, 3pm and 9pm  = 9/day   Quantity:  270 capsule   Refills:  11       * ketoconazole 2 % shampoo   Commonly known as:  NIZORAL   Used for:  Paralysis agitans (H), Seborrhea   Started by:  Edison Villasenor MD        Apply to the affected area and wash off after 5 minutes.   Quantity:  120 mL   Refills:  11       * ketoconazole 2 % cream   Commonly known as:  NIZORAL   Used for:  Seborrhea, Paralysis agitans (H)   Started by:  Edison Villasenor MD        Apply topically daily Apply topical daily as needed   Quantity:  60 g   Refills:  11       rOPINIRole HCl 2 MG Tb24 tablet   Commonly known as:  REQUIP   Used for:  Paralysis agitans (H)   Started by:  Edison Villasenor MD        2mg dose by mouth nightly @ 9pm   Quantity:  90 tablet   Refills:  11       * Notice:  This list has 2 medication(s) that are the same as other medications  prescribed for you. Read the directions carefully, and ask your doctor or other care provider to review them with you.      These medicines have changed or have updated prescriptions.        Dose/Directions    aspirin 325 MG EC tablet   This may have changed:    - medication strength  - how much to take  - how to take this  - when to take this  - additional instructions   Changed by:  Edison Villasenor MD        325mg tab by mouth daily @ 9am for a while (and then will go back to 81 mg tab by mouth daily @ 9am)   Refills:  0       finasteride 5 MG tablet   Commonly known as:  PROSCAR   This may have changed:    - how much to take  - how to take this  - when to take this  - additional instructions   Changed by:  Edison Villasenor MD        5mg tab by mouth daily @ 9am   Quantity:  30 tablet   Refills:  3       mometasone 0.1 % cream   Commonly known as:  ELOCON   This may have changed:  additional instructions   Changed by:  Edison Villasenor MD        NOT SURE IF USING   Refills:  2       SEROQUEL 25 MG tablet   This may have changed:  additional instructions   Used for:  Paralysis agitans (H)   Generic drug:  QUEtiapine   Changed by:  Edison Villasenor MD        25MG BY MOUTH NIGHTLY @ 9PM.   Quantity:  90 tablet   Refills:  5         Stop taking these medicines if you haven't already. Please contact your care team if you have questions.     fluocinolone acetonide 0.01 % Oil   Stopped by:  Edison Villasenor MD           Saw Palmetto (Serenoa repens) 450 MG Caps   Stopped by:  Edison Villasenor MD                Where to get your medicines      These medications were sent to [id number], [place] - [address]  [address]) [place] [address]     Phone:  [phone]     carbidopa-levodopa 48. MG Cpcr per CR capsule    Fluocinolone Acetonide Scalp 0.01 % Oil oil    rOPINIRole HCl 2 MG Tb24 tablet                Primary Care Provider Office  Phone # Fax #    Bernardino John [phone]       [facility] [address]  [facility] [address]        Equal Access to Services     CORI MUÑOZ : Florentino ambrosio serafino Soprincessali, waaxda luqadaha, qaybta kaalmada ademiguelinada, paco kaye laRussellalfa miranda. So [facility] [place] [facility] [phone].    ATENCIÓN: Si habla español, tiene a olmstead disposición servicios gratuitos de asistencia lingüística. Llame al [phone].    We comply with applicable federal civil rights laws and [place] laws. We do not discriminate on the basis of race, color, national origin, age, disability, sex, sexual orientation, or gender identity.            Thank you!     Thank you for choosing [facility]  for your care. Our goal is always to provide you with excellent care. Hearing back from our patients is one way we can continue to improve our services. Please take a few minutes to complete the written survey that you may receive in the mail after your visit with us. Thank you!             Your Updated Medication List - Protect others around you: Learn how to safely use, store and throw away your medicines at www.disposemymeds.org.          This list is accurate as of 11/8/18  5:54 PM.  Always use your most recent med list.                   Brand Name Dispense Instructions for use Diagnosis    aspirin 325 MG EC tablet      325mg tab by mouth daily @ 9am for a while (and then will go back to 81 mg tab by mouth daily @ 9am)        carbidopa-levodopa 48. MG Cpcr per CR capsule    RYTARY    270 capsule    3 capsule by mouth 3 times per day @ 9am, 3pm and 9pm  = 9/day    Paralysis agitans (H)       finasteride 5 MG tablet    PROSCAR    30 tablet    5mg tab by mouth daily @ 9am        Fluocinolone Acetonide Scalp 0.01 % Oil oil     1 Bottle    Wet hair and apply to scalp and leave in for 4 hours and then wash off. May use 1 times per week    Seborrhea       * ketoconazole 2 % shampoo    NIZORAL     120 mL    Apply to the affected area and wash off after 5 minutes.    Paralysis agitans (H), Seborrhea       * ketoconazole 2 % cream    NIZORAL    60 g    Apply topically daily Apply topical daily as needed    Seborrhea, Paralysis agitans (H)       mometasone 0.1 % cream    ELOCON     NOT SURE IF USING        rOPINIRole HCl 2 MG Tb24 tablet    REQUIP    90 tablet    2mg dose by mouth nightly @ 9pm    Paralysis agitans (H)       SEROQUEL 25 MG tablet   Generic drug:  QUEtiapine     90 tablet    25MG BY MOUTH NIGHTLY @ 9PM.    Paralysis agitans (H)       tamsulosin 0.4 MG capsule    FLOMAX    90 capsule    0.4mg tab by mouth daily @ 9am        terbinafine 1 % cream    lamISIL     Apply topically 2 times daily        timolol 0.5 % ophthalmic solution    TIMOPTIC    1 Bottle    1 drop both eyes daily @ 9am        XALATAN 0.005 % ophthalmic solution   Generic drug:  latanoprost     2.5 mL    One drop in both eyes @ 9pm        * Notice:  This list has 2 medication(s) that are the same as other medications prescribed for you. Read the directions carefully, and ask your doctor or other care provider to review them with you.

## 2018-11-08 NOTE — LETTER
2018      RE: Eamon Whalen  [address]  [place] [address]       Diagnosis/Summary/Recommendations:    PATIENT: Eamon Whalen  62 year old male     : 1955    MERCEDES: 2018    Parkinson  icd  May need to reduce requip xl to 2mg     Medications     9am 3pm 9pm         Aspirin 325mg 1               Bisacodyl dulcolax 5mg EC tablet ? using            Carbidopa-levodopa ER rytary 195mg 3 3 3         Finasteride proscar 5mg ?taking           Fluocinolone acetonide derma-smoothe/FS Body 0.01% oil                  Ketoconazole nizoral 2% cream                  Ketoconazole nizoral 2% shampoo                  Latanoprost xalatan 0.005% opthalmic solution       Both eyes         Mometasone elocon 0.1% cream             Quetiapine seroquel 25mg        Ropinirole requip XL 4mg TB24       1         Saw palmetto sernoa repens 1 1 1         Tamsulosin flomax 0.4mg 1               Terbinafine lamisil 250mg ?taking           Timolol timoptic 0.5% ophthalmic solution Both eyes                                           Bladder medication once daily blue round pill                                               History obtained from patient      Coding statement:   Duration of  Services: patient care and care coordination was 25 minutes  Greater than 50% of this visit was spent in counseling and coordination of care.     Edison Villasenor MD     ______________________________________    Last visit date and details:      : 1955     MERCEDES:      No show     2018        Medications     9am 3pm 9pm         Aspirin 325mg 1               Bisacodyl dulcolax 5mg EC tablet             Carbidopa-levodopa ER rytary 195mg 3 3 3         Carbidopa/levodopa Sinemet CR 50/200 Not taking           CArbidopa/levodopa sinemet 25/100 Not taking           Ciprofloxaxin cipro 500mg Not taking           Finasteride proscar 5mg ?taking           Fluocinolone acetonide derma-smoothe/FS Body 0.01% oil                   Gavilyte-G 236 g suspension ?taking           Ketoconazole nizoral 2% cream                  Ketoconazole nizoral 2% shampoo                  Latanoprost xalatan 0.005% opthalmic solution       Both eyes         Mometasone elocon 0.1% cream             Ropinirole requip 2mg tablet     1?       Ropinirole requip XL 4mg TB24       1         Saw palmetto sernoa repens 1 1 1         Tamsulosin flomax 0.4mg 1               Terbinafine lamisil 250mg ?taking           Timolol timoptic 0.5% ophthalmic solution Both eyes               Trihexyphenidyl artane 2mg  ? taking                                  Bladder medication once daily blue round pill                                         History obtained from patient        Coding statement:   Duration of  Services: patient care and care coordination was 0 minutes  Greater than 50% of this visit was spent in counseling and coordination of care.      Edison Villasenor MD      ______________________________________     Last visit date and details:       PATIENT: Eamon Whalen  62 year old male       : 1955      MERCEDES: May 22, 2018      Parkinson      rytary seems to be working in regards to tremor control except with stress  He has had some balance problems  He has leg tremors bilateral but more on the left than right side.       He came by [employer] - transportation issue      PSA elevation had two prostate biopsies and was negative  And is continued to be followed by urology Dr. Goldberg  He had a flow study. He has nocturia still.  He is on flomax which has not helped.  He is on saw palmetto 3 times per day - dose of this is unknown.   Seeing Dr. Goldberg urology and was started on a new medication but cannot recall the name of the medication but it is a blue round pill taken once daily       Thinks someone is looking over his shoulder.   He is worrying about his family - states he had to put his will and estate plan together  His daughter Marlin lives in [place] (and is 35)  and states she will do a good job. She has two sons: Edwar and Kam      He has had freezing.   He continues to have balance problems  He tries to remain active in his home.       He had freezing when visiting his daughter  He has more freezing in small spaces.       Will be getting a new mattress  He has been sleeping on a box spring  He will try to get a bathroom on the main floor so that he does not have to do stairs.           Medications     9am 3pm 9pm         Aspirin 325mg 1               Carbidopa-levodopa ER rytary 145mg 3 3 3         Fluocinolone acetonide derma-smoothe/FS Body 0.01% oil                  Ketoconazole nizoral 2% cream                  Ketoconazole nizoral 2% shampoo                  Latanoprost xalatan 0.005% opthalmic solution       Both eyes         Ropinirole requip XL 4mg TB24       1         Saw palmetto sernoa repens 1 1 1         Tamsulosin flomax 0.4mg 1               Timolol timoptic 0.5% ophthalmic solution Both eyes                                                       Bladder medication once daily blue round pill                                                                                      History obtained from patient       He does not have clear wearing off from the rytary  He has some fogginess.           PLAN  Dermatology consultation in [place] - may need light therapy or something as his skin problems continue      Seeing Sheree Neumann today.  May need confirmation on his bladder medication from [facility] pharmacy  [phone]        Consider med rytary adjustment, etc.       Return 6 months.           Coding statement:   Duration of  Services: patient care and care coordination was 25 minutes  Greater than 50% of this visit was spent in counseling and coordination of care.      Edison Whalen is a 62 year old male coming in for a follow-up visit for Medication Therapy Management.  He was referred to me from   "[place]. He also met with Dr. Villasenor today.      Chief Complaint: Follow up from our visit on 2/20/18  Tobacco: cigar occasionally  Alcohol: 1-3 beverages / week      Medication Adherence/Access: no issues reported      Parkinson's Disease:  Current medications include: Rytary 36. mg 3 capsules three times daily (9 am, 3 pm, and 9 pm) and ropinirole XL 4 mg daily. Patient states he sleeps \"solid\" until 3-3:30 am and then gets up to go to the bathroom and getting back to sleep after that is difficult. He has been more active recently walking outside - pushed a  for 2 hours last week, though this was \"too much.\" He states that he is still having muscle tightening and freezing during the day. Physical activity causes more muscle tightness. For the most part when his legs are relaxed, he doesn't have tremor. He endorses some hallucinations and will sometimes talk with the people he sees (it comes and goes, multiple times a week). When he goes to the grocery he feels somebody behind him. He also has been looking for a more comfortable chair to use at home because he gets so stiff in many chairs. Patient endorses more slowness and freezing recently too.      BPH: Current medications include tamsulosin 0.4 mg, finasteride 5 mg once daily, saw palmetto 160 mg three times daily (only for the past month). He states \"my dad had success with saw palmetto\" so he would like to continue this. He does not report noticing any improvement in urination since stating tamsulosin and finasteride about 2 months ago. He will be following up with his urologist later this month. He had a prostate biopsy which was negative and has another one coming up.      Seborrhea: Patient is using a ketoconazole creams and shampoos but feels his skin continues to worsen. During his visit with Dr. Villasenor today, Dr. Villasenor decided to refer him to dermatology.      ASCVD prevention: Patient is currently taking aspirin 325 mg for prevention. He " denies bruising/bleeding. He does not have a history of stroke or MI.      Current labs include:        BP Readings from Last 3 Encounters:   05/22/18 132/81   02/20/18 (!) 168/100   11/21/17 141/78       ASSESSMENT:      Current medications were reviewed today as discussed above.        Medication Adherence: excellent, no issues identified      Parkinson's Disease:  Needs improvement. Patient may benefit from a higher dose of Ryary, as discussed with Dr. Villasenor. After on a higher dose of Rytary, he may benefit from tapering down the ropinirole, which may be contributing to hallucinations. Would not treat the hallucinations at this time since his motor function is not well controlled and prefer to make one change at a time. The hallucinations are also not currently dangerous or too bothersome to the patient.      BPH: Needs improvement. Patient would benefit from follow up with urology. Finasteride may take months for efficacy but tamsulosin should be effective by now and he may need to take an alternate medication.      Seborrhea: Needs improvement. Patient would benefit from seeing dermatology.      ASCVD prevention: Needs improvement. Patient would benefit from lower dose of aspirin as he does not have an indication for the 325 mg dose and it may pose more bleeding risk than benefit.      PLAN:          1. Increase Rytary to 195 mg capsules - take 3 capsules 3 times daily.  2. Patient to follow up with urologist (Dr. Goldberg) to discuss BPH.  3. Patient to see dermatology (referral placed by Dr. Villasenor today).  4. Patient to decrease aspirin to 81 mg daily.      I spent 30 minutes with this patient today. All changes were made via collaborative practice agreement with Dr. Edison Villasenor. A copy of the visit note was provided to the movement disorder specialist, Dr. Villasenor.       Will follow up in 3 months (returning to see Dr. Villasenor 8/21/18)      I concur with the note as dictated above which reflects our joint  assessment and plan.   Hannah Melo PharmD          The patient was given a summary of these recommendations as an after visit summary.      Sheree Neumann, Pharm.D.  Medication Therapy Management Resident  Phone: [phone]  Pager: [phone]          ______________________________________      Patient was asked about 14 Review of systems including changes in vision (dry eyes, double vision), hearing, heart, lungs, musculoskeletal, depression, anxiety, snoring, RBD, insomnia, urinary frequency, urinary urgency, constipation, swallowing problems, hematological, ID, allergies, skin problems: seborrhea, endocrinological: thyroid, diabetes, cholesterol; balance, weight changes, and other neurological problems and these were not significant at this time except for   Patient Active Problem List   Diagnosis     Paralysis agitans (H)     Wears glasses     Balance problems     Constipation     GERD (gastroesophageal reflux disease)     Urinary urgency     Insomnia     Seborrhea     Lumbago     Hearing loss     Family history of Parkinson's disease     Central retinal vein occlusion     Choroidal nevus     Vision problem -- Right Eye     ED (erectile dysfunction)     Other specified glaucoma     Abnormal electrocardiogram     Bifascicular block     Prolonged Q-T interval on ECG     History of EMG     Encounter for examination for driving license     Parkinson disease (H)     Elevated prostate specific antigen (PSA)     Family history of epilepsy and other diseases of the nervous system     Gastroesophageal reflux disease     Impotence of organic origin     Other abnormalities of gait and mobility     Personal history of other medical treatment (CODE)     Benign neoplasm of choroid     Weakness          Allergies   Allergen Reactions     Cats      Dogs      Past Surgical History:   Procedure Laterality Date     NO HISTORY OF SURGERY       Past Medical History:   Diagnosis Date     Abnormal electrocardiogram 3/23/2017      Balance problems      Bifascicular block 3/23/2017     Central retinal vein occlusion 2014     Choroidal nevus 2014     Constipation      Elevated prostate specific antigen (PSA) 2018     Family history of Parkinson's disease      GERD (gastroesophageal reflux disease)      Glaucoma 2015     Hearing loss      History of EMG 2018    [facility]  Nerve Conduction & EMG Report  Patient:       Eamon Whalen Patient ID:    [id number] Gender:        Male YOB: 1955 Age:           62 Years 2 Months    History & Examination:  62 year old man with history of Parkinson's disease, restless leg syndrome, and paresthesias in feet and legs. Query polyneuropathy. His clinical examination      Lumbago      Parkinson's disease (H)      Prolonged Q-T interval on ECG 3/23/2017     Urinary urgency      Wears glasses      Social History     Social History     Marital status: Single     Spouse name: N/A     Number of children: N/A     Years of education: N/A     Occupational History     farm Self Employed.     Social History Main Topics     Smoking status: Never Smoker     Smokeless tobacco: Never Used      Comment: cigar sometimes     Alcohol use Yes      Comment: occ     Drug use: No     Sexual activity: Yes     Partners: Female     Other Topics Concern     Not on file     Social History Narrative    1 daughter who is  and lives in [place]    She was pregnant with the 1st child and had some problems in the 6-7 month of pregnancy with     Baby that may have hydrocephalus as there was IUGR. The baby  after 3 months in 2011. She was encouraged her to undergo genetic testing which was negative and they are considering having more children.     He met a woman and may  a woman from [facility] - who has [place] roots. 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 He met her 3 times. Her name is Tavia and lives in [facility]. They will be visiting  "within a day or so with her mother. She is 36 yrs old. She is not going back there.     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 Depending on the status of the main house may build new house.         [place] in his home town had parkinson    Germans lived on the other side of town        [facility] -- [place] of [place] 12 miles.         This was way before pesticides        Maternal grandfather     Dax Sorenson - [place] name     He would have to be brought out to a rocking chair    \"hardening of there arteries.         mother       Drug and lactation database from the [place] National Library of Medicine:  http://toxnet.nlm.nih.gov/cgi-bin/sis/htmlgen?LACT      B/P: Data Unavailable, T: Data Unavailable, P: Data Unavailable, R: Data Unavailable 0 lbs 0 oz  There were no vitals taken for this visit., There is no height or weight on file to calculate BMI.  Medications and Vitals not listed above were documented in the cart and reviewed by me.     Current Outpatient Prescriptions   Medication Sig Dispense Refill     aspirin 81 MG EC tablet Take 81 mg by mouth daily       carbidopa-levodopa (RYTARY) 48. MG CPCR per CR capsule Take 3 capsules by mouth 3 times daily 270 capsule 11     finasteride (PROSCAR) 5 MG tablet Take 5 mg by mouth daily       latanoprost (XALATAN) 0.005 % ophthalmic solution One drop in both eyes @ 9pm 2.5 mL 1     mometasone (ELOCON) 0.1 % cream   2     QUEtiapine (SEROQUEL) 25 MG tablet Take one-half tablet by mouth nightly. May increase to one tablet nightly after 1-2 weeks if not effective. 30 tablet 5     rOPINIRole HCl (REQUIP) 2 MG TB24 tablet Take 1 tablet (2 mg) by mouth At Bedtime (dose reduction to start in two weeks, on 8/28/18) 30 tablet 5     Saw Palmetto, Serenoa repens, 450 MG CAPS Take 1 capsule by mouth 3 times daily       tamsulosin (FLOMAX) 0.4 MG " capsule 0.4mg tab by mouth daily @ 9am 90 capsule 3     terbinafine (LAMISIL) 1 % cream Apply topically 2 times daily       timolol (TIMOPTIC) 0.5 % ophthalmic solution 1 drop both eyes daily @ 9am 1 Bottle 11         Edison Villasenor MD

## 2018-11-08 NOTE — PATIENT INSTRUCTIONS
MERCEDES: November 8, 2018    Parkinson  icd  May need to reduce requip xl to 2mg     Had CTangio, head ct and ecg and xray of his chest.     He is on requip XL 2mg  He is on seroquel 25mg at night  He is using rytary 195mg 3 capsules 3/day  He has some tremor that is controllable    There was a question of hallucinations/confusion/delusions/etc  Details a bit murky but seems okay    He continues to have skin problems.     Medications     9am 3pm 9pm         Aspirin 325mg (may go back to 81mg) 1               Bisacodyl dulcolax 5mg EC tablet Not using            Carbidopa-levodopa ER rytary 195mg 3 3 3         Finasteride proscar 5mg 1           Fluocinolone acetonide derma-smoothe/FS Body 0.01% oil   has not tried               Ketoconazole nizoral 2% cream  not sure if using                Ketoconazole nizoral 2% shampoo                  Latanoprost xalatan 0.005% opthalmic solution       Both eyes         Mometasone elocon 0.1% cream NOT SURE IF USING            Quetiapine seroquel 25mg   1     Ropinirole requip XL 2mg TB24       1         Saw palmetto sernoa repens STOPPED           Tamsulosin flomax 0.4mg 1               Terbinafine lamisil 1% cream Not using           Timolol timoptic 0.5% ophthalmic solution Both eyes                                                                  History obtained from patient    PLAN  Podiatry referral for nails and toenail fungus  Consider going every other night of the requip XL and see if can go off it.   He is using 2mg dose now of requip XL    Continue on the 25mg of seroquel  This can be increased if needed in the future.     Continue on the rtyary 195mg 3 capsules  Times per day     Visit with Sheree Neumann, PHarm D today    Return visit.,

## 2018-11-26 ENCOUNTER — TELEPHONE (OUTPATIENT)
Dept: PHARMACY | Facility: CLINIC | Age: 63
End: 2018-11-26

## 2018-11-26 NOTE — TELEPHONE ENCOUNTER
Patient called and left message for [facility] pharmacist requesting a call back.     Sheree Neumann, Pharm.D.  Medication Therapy Management Pharmacist  Phone: [phone]

## 2018-11-27 NOTE — TELEPHONE ENCOUNTER
Spoke with patient. He states he heard about Nuplazid and wanted to know if it would be a good medication for him. I explained the risks and benefits of Nuplazid and thought it would be best to stay with quetiapine for now. Patient is still considering tapering off of ropinirole but he would prefer to wait until after the holidays to do this. No medication changes will be made at this time.    Sheree Neumann, Pharm.D.  Medication Therapy Management Pharmacist  Phone: [phone]

## 2019-01-07 ENCOUNTER — TELEPHONE (OUTPATIENT)
Dept: PHARMACY | Facility: CLINIC | Age: 64
End: 2019-01-07

## 2019-01-07 NOTE — TELEPHONE ENCOUNTER
Patient is calling to confirm his upcoming appointment with Dr. Villasenor and [facility].    Sheree Neumann, Pharm.D.  Medication Therapy Management Pharmacist  Phone: [phone]

## 2019-01-07 NOTE — TELEPHONE ENCOUNTER
Left voicemail message for patient informing him of his upcoming appointments on 2/7/19 at 1:40 pm with Dr. Villasenor and 2:30 pm with Sheree Neumann, [place].    Sheree Neumann, Pharm.D.  Medication Therapy Management Pharmacist  Phone: [phone]

## 2019-02-01 ENCOUNTER — TELEPHONE (OUTPATIENT)
Dept: NEUROLOGY | Facility: CLINIC | Age: 64
End: 2019-02-01

## 2019-02-01 NOTE — TELEPHONE ENCOUNTER
[facility] Call Center    Phone Message    May a detailed message be left on voicemail: yes    Reason for Call: Other: Pt needs forms faxed to [facility] for his transportation. Please give him a call back.      Action Taken: Message routed to:  [facility] Neurology

## 2019-02-01 NOTE — TELEPHONE ENCOUNTER
Patient also left voicemail for me regarding this issue. We need to call him back to get more information regarding this.     Sheree Neumann, Pharm.D.  Medication Therapy Management Pharmacist  Phone: [phone]

## 2019-02-04 DIAGNOSIS — G20.A1 PARALYSIS AGITANS (H): ICD-10-CM

## 2019-02-04 RX ORDER — QUETIAPINE FUMARATE 25 MG/1
TABLET, FILM COATED ORAL
Qty: 30 TABLET | Refills: 5 | Status: SHIPPED | OUTPATIENT
Start: 2019-02-04 | End: 2019-02-25

## 2019-02-04 NOTE — TELEPHONE ENCOUNTER
Nurse received refill request for: Quetiapine 25mg    Pharmacy: [facility] pharmacy    Last re-ordered: 8/21/2018 for 30 tablets with 5 refills    Last appointment: 11/8/2018    Next appointment: 2/7/2019    Action taken: Sent for refill

## 2019-02-04 NOTE — TELEPHONE ENCOUNTER
"Called Eamon and informed him we did not receive forms regarding transportation. Eamon stated it would have been from \"[employer].\" Eamon stated he will give them a call. I informed Eamon of our fax number and to have them attention the forms to \"[employer].\"  "

## 2019-02-05 ENCOUNTER — DOCUMENTATION ONLY (OUTPATIENT)
Dept: NEUROLOGY | Facility: CLINIC | Age: 64
End: 2019-02-05

## 2019-02-05 NOTE — PROGRESS NOTES
[facility] Call Center    Phone Message    May a detailed message be left on voicemail: yes    Reason for Call: Other: Pt calling asking for a call back when transportation forms have been signed and sent off.     Action Taken: Message routed to:  [facility]

## 2019-02-05 NOTE — PROGRESS NOTES
Transportation forms was placed in provider folder for signature 2/5/19    Provider signed form and form was fax to [phone]    Patient was called and was informed that form had been fax to [employer] 2/15/19    Mendel Hicks CMA

## 2019-02-06 NOTE — TELEPHONE ENCOUNTER
Called patient to let patient know that form was signed, completed and fax to company, patient had no other questions at this time.    Yandy Hicks, CMA

## 2019-03-12 ENCOUNTER — TELEPHONE (OUTPATIENT)
Dept: NEUROLOGY | Facility: CLINIC | Age: 64
End: 2019-03-12

## 2019-03-12 NOTE — TELEPHONE ENCOUNTER
[facility] Call Center    Phone Message    May a detailed message be left on voicemail: yes    Reason for Call: Other: Pt calling asking if Dr. Villasenor had received any forms from his medical transportation company, [employer]. Please follow up with pt.    Action Taken: Message routed to:  [facility]  NEUROLOGY

## 2019-03-12 NOTE — TELEPHONE ENCOUNTER
Called and spoke with patient and let him know that forms were fax over 2/5/19 to [employer] to the fax number that ws provided, Patient stated that they did have the form, and everything is taken care of,and he will be able to make it to his appointment. Patient stated that we did our part getting the form back, [employer] didn't do there part. But everything is taken care of.    Yandy Hicks, CMA

## 2019-03-14 ENCOUNTER — TELEPHONE (OUTPATIENT)
Dept: NEUROLOGY | Facility: CLINIC | Age: 64
End: 2019-03-14

## 2019-03-14 NOTE — TELEPHONE ENCOUNTER
[facility] Call Center    Phone Message    May a detailed message be left on voicemail: no    Reason for Call: Other: Pt reports increased tremors and thinks his medications aren't working like they used to. Pt is unsure what he should do about this until he's seen on 3/28/19. Please call Pt back to discuss.     Action Taken: Message routed to:  [facility]

## 2019-03-14 NOTE — TELEPHONE ENCOUNTER
"Assessment  Symptoms: Increased, constant, \"full body\" tremors, wake him up at night, legs feel weak and feet feel glued to floor. Difficult to make the first step after he makes the first step he can walk fine.   Uses a walker in the house. Loses balance about once a day (last fell a few weeks ago onto his bed), urgency with bowel movements  Patient is home bound, he is not able to attend Muslim and has  visit his home once a month  Symptoms have been ongoing for at least 1 month    Pattern: Tremors are okay the first 1/2-1 hours in the morning. After this it is constant. Weakness in legs, feet sticking to the floor are constant. Parkinson's disease medications do not seem to help.    Background  Patient is taking:  carbidopa-levodopa (RYTARY) 48. MG CPCR per CR capsule 270 capsule 11 2018  No   Sig: 3 capsule by mouth 3 times per day @ 9am, 3pm and 9pm  = 9/day     rOPINIRole HCl (REQUIP) 2 MG TB24 tablet 90 tablet 11 2018  No   Simg dose by mouth nightly @ 9pm   -No hallucinations since cutting down to 2 mg states he is okay on this  -Excessive gambling or shopping?  No  Hypersexuality?  No  -Excessive pornography use? No   -Stated \"the incident\" that occurred with my daughter or anything like this has not happened  again and he is okay on the 2 mg (per Claritza Fall note on 10/16/2018 he walked into his daughters garage naked).    QUEtiapine (SEROQUEL) 25 MG tablet 90 tablet 5 2018  No   SiMG BY MOUTH NIGHTLY @ 9PM     Are you taking your medications as directed?   Yes, took Rytary late yesterday evening (5PM) and missed the 9 PM    -Most of the time he takes the Rytary at his scheduled times states it is rare for him to take it late.  -Are you taking food with the Rytary? Sometimes he does due to not being able to take it around breakfast      Are you having any new symptoms such as,   Dehydration  No   Eating OK  Yes   Hit your head  No   Falls   Fell onto his bed a couple " "weeks back    Mental stress  Yes   Emotional stress Yes sister and brother both had     cancer surgery 2 weeks ago. Unable     to drive and see daughter/family   Confusion  No   Hallucinations  No      Recommendation  1.I advised Eamon that I will discuss what is going on with Dr. Villasenor.     2.I informed him that rocking side-to-side, focusing on a spot in front of him to step on, and using a laser pointer on a spot in front him and trying to step on the laser can help with his gait freezing.     3.I informed Eamon that the stress of a disease such as Parkinson's disease, and personal stressors such as his brother and sisters cancer can make the symptoms of Parkinson's disease worse. Speaking with a health psychologist could offer some stress relief and they would be able to give him a \"tool box\" of strategies to use to manage stress. Eamon stated he would like to think about seeing a health psychologist and will talk with Dr. Villasenor about it in his next visit      "

## 2019-03-14 NOTE — TELEPHONE ENCOUNTER
Let me know if you order home care. We'll need to find an agency near his home and fax them the referral. Claritza

## 2019-03-18 ENCOUNTER — TELEPHONE (OUTPATIENT)
Dept: NEUROLOGY | Facility: CLINIC | Age: 64
End: 2019-03-18

## 2019-03-18 NOTE — TELEPHONE ENCOUNTER
Informed ryan that Dr. Villasenor and Sheree Neumann would like to see him in clinic first before making any changes at this time.

## 2019-03-18 NOTE — TELEPHONE ENCOUNTER
carbidopa-levodopa (RYTARY) 48. MG CPCR per CR capsule 270 capsule 11 11/8/2018  No   Sig: 3 capsule by mouth 3 times per day @ 9am, 3pm and 9pm  = 9/day   Prior Authorization Retail Medication Request    Medication/Dose: Rytary  ICD code (if different than what is on RX):  Paralysis agitans G20  Previously Tried and Failed:   Ketoconazole 2% cream, ketoconazole 2% shampoo, quetiapine 25 mg, Ropinirole 2 mg, carbidopa/levodopa  CR, carbidopa/levodopa  mg, Pimavanserin 34 mg, Ropinirole 4 mg, Ropinirole 8 mg, ropinirole 12 mg, trihexyphenidyl 2 mg, Mirapex 0.5 mg  Rationale:  He has been taking this medication and its works great for him    Insurance Name:   Insurance ID:  Blue Plus [employer]age  [id number]    Pharmacy Information (if different than what is on RX)  [facility]  Phone:  [phone]

## 2019-03-18 NOTE — TELEPHONE ENCOUNTER
Central Prior Authorization Team   Phone: [phone]    PA Initiation    Medication: carbidopa-levodopa (RYTARY) 48. MG CPCR   Insurance Company: Blue Plus [facility] - Phone [phone] Fax [phone]  Pharmacy Filling the Rx: [id number] [place], [place] - [address]  Filling Pharmacy Phone: [phone]  Filling Pharmacy Fax: [phone]  Start Date: 3/18/2019

## 2019-03-19 NOTE — TELEPHONE ENCOUNTER
Patient notified, he is aware I called the pharmacy as well and they are getting his rx ready for him.

## 2019-03-19 NOTE — TELEPHONE ENCOUNTER
Prior Authorization Approval    Authorization Effective Date: 3/16/2019  Authorization Expiration Date: 3/16/2020  Medication: carbidopa-levodopa (RYTARY) 48. MG CPCR - P/A APPROVED  Approved Dose/Quantity: 270  Reference #: CASE# [id number]   Insurance Company: Blue Plus [employer] - Phone [phone] Fax [phone]  Expected CoPay:       CoPay Card Available:      Foundation Assistance Needed:    Which Pharmacy is filling the prescription (Not needed for infusion/clinic administered): [id number] - [place] ([place]), [place] - [address]  Pharmacy Notified: Yes - via voicemail  Patient Notified:

## 2019-03-25 NOTE — PROGRESS NOTES
Diagnosis/Summary/Recommendations:    PATIENT: Eamon Whalen  63 year old male     : 1955    MERCEDES:     2019     Medications     9am 3pm 9pm         Aspirin 325mg (may go back to 81mg) 1               Carbidopa-levodopa ER rytary 195mg 3 3 3         Finasteride proscar 5mg 1           Fluocinolone acetonide derma-smoothe/FS Body 0.01% oil   has not tried               Ketoconazole nizoral 2% cream  not sure if using                Ketoconazole nizoral 2% shampoo                  Lactobacillus acidophilus probiotic             Latanoprost xalatan 0.005% opthalmic solution       Both eyes         Mometasone elocon 0.1% cream NOT SURE IF USING            Quetiapine seroquel 25mg     1 (not taking?)       Ropinirole requip XL 2mg TB24       1         Tamsulosin flomax 0.4mg 1               Terbinafine lamisil 1% cream Not using           Timolol timoptic 0.5% ophthalmic solution Both eyes                                                                        History obtained from patient        Documentation of Face-to-Face and Certification for Home Health Services     Patient: Eamon Whalen   YOB: 1955  MR Number: [id number]  Today's Date: 3/28/2019    I certify that patient: Eamon Whalen is under my care and that I, or a nurse practitioner or physician's assistant working with me, had a face-to-face encounter that meets the physician face-to-face encounter requirements with this patient on: 3/28/2019.    This encounter with the patient was in whole, or in part, for the following medical condition, which is the primary reason for home health care: parkinson.    I certify that, based on my findings, the following services are medically necessary home health services: Nursing, Occupational Therapy, Physical Therapy, Speech Language Therapy and social work.    My clinical findings support the need for the above services because: Nurse is needed: To provide assessment and oversight required in  the home to assure adherence to the medical plan due to: parkinson.., Occupational Therapy Services are needed to assess and treat cognitive ability and address ADL safety due to impairment in cognition., Physical Therapy Services are needed to assess and treat the following functional impairments: balance/movement., Speech Therapy Services are needed to assess and treat impairments in language and/or swallow functions due to swallowing and speech. and other issues. Also needs to see  to help address living issues related to impairments related to cognitive and motor changes.    Further, I certify that my clinical findings support that this patient is homebound (i.e. absences from home require considerable and taxing effort and are for medical reasons or [employer]ism services or infrequently or of short duration when for other reasons) because: Requires assistance of another person or specialized equipment to access medical services because patient:  of dementia..    Based on the above findings. I certify that this patient is confined to the home and needs intermittent skilled nursing care, physical therapy and/or speech therapy.  The patient is under my care, and I have initiated the establishment of the plan of care.  This patient will be followed by a physician who will periodically review the plan of care.  Physician/Provider to provide follow up care: Bernardino John    Responsible Medicare certified PECOS Physician: josue  Physician Signature: See electronic signature associated with these discharge orders.  Date: 3/28/2019        Coding statement:   Duration of  Services: patient care and care coordination was 15 minutes  Greater than 50% of this visit was spent in counseling and coordination of care.     Edison Villasenor MD     ______________________________________    Last visit date and details:      Parkinson  icd  May need to reduce requip xl to 2mg      Had CTangio, head ct and ecg and xray of his  chest.      He is on requip XL 2mg  He is on seroquel 25mg at night  He is using rytary 195mg 3 capsules 3/day  He has some tremor that is controllable     There was a question of hallucinations/confusion/delusions/etc  Details a bit murky but seems okay     He continues to have skin problems.      Medications     9am 3pm 9pm         Aspirin 325mg (may go back to 81mg) 1               Bisacodyl dulcolax 5mg EC tablet Not using            Carbidopa-levodopa ER rytary 195mg 3 3 3         Finasteride proscar 5mg 1           Fluocinolone acetonide derma-smoothe/FS Body 0.01% oil   has not tried               Ketoconazole nizoral 2% cream  not sure if using                Ketoconazole nizoral 2% shampoo                  Latanoprost xalatan 0.005% opthalmic solution       Both eyes         Mometasone elocon 0.1% cream NOT SURE IF USING            Quetiapine seroquel 25mg     1       Ropinirole requip XL 2mg TB24       1         Saw palmetto sernoa repens STOPPED             Tamsulosin flomax 0.4mg 1               Terbinafine lamisil 1% cream Not using           Timolol timoptic 0.5% ophthalmic solution Both eyes                                                                        History obtained from patient     PLAN  Podiatry referral for nails and toenail fungus  Consider going every other night of the requip XL and see if can go off it.   He is using 2mg dose now of requip XL     Continue on the 25mg of seroquel  This can be increased if needed in the future.      Continue on the rtyary 195mg 3 capsules  Times per day      Visit with Berry Fuller D today     Return visit.,           ______________________________________      Patient was asked about 14 Review of systems including changes in vision (dry eyes, double vision), hearing, heart, lungs, musculoskeletal, depression, anxiety, snoring, RBD, insomnia, urinary frequency, urinary urgency, constipation, swallowing problems, hematological, ID, allergies,  skin problems: seborrhea, endocrinological: thyroid, diabetes, cholesterol; balance, weight changes, and other neurological problems and these were not significant at this time except for   Patient Active Problem List   Diagnosis     Paralysis agitans (H)     Wears glasses     Balance problems     Constipation     GERD (gastroesophageal reflux disease)     Urinary urgency     Insomnia     Seborrhea     Lumbago     Hearing loss     Family history of Parkinson's disease     Central retinal vein occlusion     Choroidal nevus     Vision problem -- Right Eye     ED (erectile dysfunction)     Other specified glaucoma     Abnormal electrocardiogram     Bifascicular block     Prolonged Q-T interval on ECG     History of EMG     Encounter for examination for driving license     Parkinson disease (H)     Elevated prostate specific antigen (PSA)     Family history of epilepsy and other diseases of the nervous system     Gastroesophageal reflux disease     Impotence of organic origin     Other abnormalities of gait and mobility     Personal history of other medical treatment (CODE)     Benign neoplasm of choroid     Weakness          Allergies   Allergen Reactions     Cats      Dogs      Past Surgical History:   Procedure Laterality Date     NO HISTORY OF SURGERY       Past Medical History:   Diagnosis Date     Abnormal electrocardiogram 3/23/2017     Balance problems      Bifascicular block 3/23/2017     Central retinal vein occlusion 4/11/2014     Choroidal nevus 4/11/2014     Constipation      Elevated prostate specific antigen (PSA) 2/20/2018     Family history of Parkinson's disease      GERD (gastroesophageal reflux disease)      Glaucoma 6/4/2015     Hearing loss      History of EMG 2/20/2018    [facility]  Nerve Conduction & EMG Report  Patient:       Eamon Whalen Patient ID:    [id number] Gender:        Male YOB: 1955 Age:           62 Years 2 Months    History &  Examination:  62 year old man with history of Parkinson's disease, restless leg syndrome, and paresthesias in feet and legs. Query polyneuropathy. His clinical examination      Lumbago      Parkinson's disease (H)      Prolonged Q-T interval on ECG 3/23/2017     Urinary urgency      Wears glasses      Social History     Socioeconomic History     Marital status: Single     Spouse name: Not on file     Number of children: Not on file     Years of education: Not on file     Highest education level: Not on file   Occupational History     Occupation: farm     Employer: SELF EMPLOYED.   Social Needs     Financial resource strain: Not on file     Food insecurity:     Worry: Not on file     Inability: Not on file     Transportation needs:     Medical: Not on file     Non-medical: Not on file   Tobacco Use     Smoking status: Never Smoker     Smokeless tobacco: Never Used     Tobacco comment: cigar sometimes   Substance and Sexual Activity     Alcohol use: Yes     Comment: occ     Drug use: No     Sexual activity: Yes     Partners: Female   Lifestyle     Physical activity:     Days per week: Not on file     Minutes per session: Not on file     Stress: Not on file   Relationships     Social connections:     Talks on phone: Not on file     Gets together: Not on file     Attends Jain service: Not on file     Active member of club or organization: Not on file     Attends meetings of clubs or organizations: Not on file     Relationship status: Not on file     Intimate partner violence:     Fear of current or ex partner: Not on file     Emotionally abused: Not on file     Physically abused: Not on file     Forced sexual activity: Not on file   Other Topics Concern     Parent/sibling w/ CABG, MI or angioplasty before 65F 55M? Not Asked   Social History Narrative    1 daughter who is  and lives in [place]    She was pregnant with the 1st child and had some problems in the 6-7 month of pregnancy with     Baby that may  "have hydrocephalus as there was IUGR. The baby  after 3 months in 2011. She was encouraged her to undergo genetic testing which was negative and they are considering having more children.     He met a woman and may  a woman from [place] - who has [place] roots. 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 He met her 3 times. Her name is Tavia and lives in [place] [facility] [place] [facility]. They will be visiting within a day or so with her mother. She is 36 yrs old. She is not going back there.     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 Depending on the status of the main house may build new house.         [place] in his home town had parkinson    [place] lived [place] [address] [place].         This was way before pesticides        Maternal grandfather     Dax Sorenson - [place] name     He would have to be brought out to a rocking chair    \"hardening of there arteries.         mother       Drug and lactation database from the [place] National Library of Medicine:  http://toxnet.nlm.nih.gov/cgi-bin/sis/htmlgen?LACT      B/P: Data Unavailable, T: Data Unavailable, P: Data Unavailable, R: Data Unavailable 0 lbs 0 oz  There were no vitals taken for this visit., There is no height or weight on file to calculate BMI.  Medications and Vitals not listed above were documented in the cart and reviewed by me.     Current Outpatient Medications   Medication Sig Dispense Refill     aspirin 325 MG EC tablet 325mg tab by mouth daily @ 9am for a while (and then will go back to 81 mg tab by mouth daily @ 9am)       carbidopa-levodopa (RYTARY) 48. MG CPCR per CR capsule 3 capsule by mouth 3 times per day @ 9am, 3pm and 9pm  = 9/day 270 capsule 11     finasteride (PROSCAR) 5 MG tablet 5mg tab by mouth daily @ 9am 30 tablet 3     Fluocinolone Acetonide Scalp 0.01 % " OIL oil Wet hair and apply to scalp and leave in for 4 hours and then wash off. May use 1 times per week 1 Bottle 11     ketoconazole (NIZORAL) 2 % cream Apply topically daily Apply topical daily as needed 60 g 11     ketoconazole (NIZORAL) 2 % shampoo Apply to the affected area and wash off after 5 minutes. 120 mL 11     Lactobacillus (ACIDOPHILUS PROBIOTIC) 0.5 MG TABS   3     latanoprost (XALATAN) 0.005 % ophthalmic solution One drop in both eyes @ 9pm 2.5 mL 1     mometasone (ELOCON) 0.1 % cream NOT SURE IF USING  2     QUEtiapine (SEROQUEL) 25 MG tablet 25MG BY MOUTH NIGHTLY @ 9PM. 90 tablet 5     rOPINIRole HCl (REQUIP) 2 MG TB24 tablet 2mg dose by mouth nightly @ 9pm 90 tablet 11     tamsulosin (FLOMAX) 0.4 MG capsule 0.4mg tab by mouth daily @ 9am 90 capsule 3     timolol (TIMOPTIC) 0.5 % ophthalmic solution 1 drop both eyes daily @ 9am 1 Bottle 11         Edison Villasenor MD

## 2019-03-28 ENCOUNTER — OFFICE VISIT (OUTPATIENT)
Dept: PHARMACY | Facility: CLINIC | Age: 64
End: 2019-03-28
Payer: COMMERCIAL

## 2019-03-28 ENCOUNTER — OFFICE VISIT (OUTPATIENT)
Dept: NEUROLOGY | Facility: CLINIC | Age: 64
End: 2019-03-28
Payer: COMMERCIAL

## 2019-03-28 VITALS — HEART RATE: 87 BPM | OXYGEN SATURATION: 97 % | SYSTOLIC BLOOD PRESSURE: 145 MMHG | DIASTOLIC BLOOD PRESSURE: 92 MMHG

## 2019-03-28 VITALS — OXYGEN SATURATION: 97 % | SYSTOLIC BLOOD PRESSURE: 145 MMHG | HEART RATE: 87 BPM | DIASTOLIC BLOOD PRESSURE: 92 MMHG

## 2019-03-28 DIAGNOSIS — R32 URINARY INCONTINENCE, UNSPECIFIED TYPE: ICD-10-CM

## 2019-03-28 DIAGNOSIS — L21.9 SEBORRHEA: ICD-10-CM

## 2019-03-28 DIAGNOSIS — N40.0 BENIGN PROSTATIC HYPERPLASIA, UNSPECIFIED WHETHER LOWER URINARY TRACT SYMPTOMS PRESENT: ICD-10-CM

## 2019-03-28 DIAGNOSIS — G20.A1 PARKINSON DISEASE (H): Primary | ICD-10-CM

## 2019-03-28 DIAGNOSIS — G20.A1 PARALYSIS AGITANS (H): Primary | ICD-10-CM

## 2019-03-28 DIAGNOSIS — G20.A1 PARKINSON'S DISEASE (H): Primary | ICD-10-CM

## 2019-03-28 PROCEDURE — 99607 MTMS BY PHARM ADDL 15 MIN: CPT | Performed by: PHARMACIST

## 2019-03-28 PROCEDURE — 99606 MTMS BY PHARM EST 15 MIN: CPT | Performed by: PHARMACIST

## 2019-03-28 RX ORDER — ASPIRIN 81 MG/1
TABLET ORAL
COMMUNITY
End: 2021-01-29

## 2019-03-28 ASSESSMENT — PAIN SCALES - GENERAL: PAINLEVEL: NO PAIN (0)

## 2019-03-28 NOTE — Clinical Note
3/28/2019       RE: Eamon Whalen  [address]  [place] [address]     Dear Colleague,    Thank you for referring your patient, Eamon Whalen, to the [facility] NEUROLOGY at [facility]. Please see a copy of my visit note below.    No notes on file    Again, thank you for allowing me to participate in the care of your patient.      Sincerely,    Edison Villasenor MD

## 2019-03-28 NOTE — NURSING NOTE
"Glaucoma in right eye so is taking another drop of latanoprost in that eye. Left eye has red blood vessel. He said his primary care provider was not concerned.    Skin on face and scalp has signs of dermatitis.  Speech and voice normal and fluent.    He is having incontinence x 2 at night. He wears diapers because he will urinate and wakes up suddenly.    Had diarrhea issues and he was unable to provide a sample to his doctors, \"it would not go away.\" Happening 3-4-5 times a day Immodium did not work. About 1 month ago it lessened. No occurences in last week. BM is solid and occurring once every other day.    His number one goal is to get better symptom relief for his Parkinson's disease.  "

## 2019-03-28 NOTE — PROGRESS NOTES
SUBJECTIVE/OBJECTIVE:                Eamon Whalen is a 63 year old male coming in for a follow-up visit for Medication Therapy Management.  He was referred to me from Dr. Villasenor and he is also seeing Dr. Villasenor today in clinic.     Chief Complaint: Follow up from our visit on 11/8/18  Tobacco: No tobacco use  Alcohol: 1-3 beverages / week    Medication Adherence/Access: no issues reported    Parkinson's Disease:  Current medications include: Carbidopa-levodopa (Rytary) 48. mg 3 capsules 3 times/day and ropinirole ER 2 mg daily. He also takes quetiapine 25 mg nightly.  Patient states that he feels like his Parkinson's medications have been less effective late.  He has more significant tremor and balance issues recently.  He denies any recent hallucinations. Of note, patient mentions that he had issues with diarrhea for about 6 months; his PCP recommended he take Imodium and he states the diarrhea has now resolved.     Urinary incontinence/BPH: Currently taking finasteride 5 mg daily and tamuslosin 0.4 mg daily.  Patient states that these medications have not been helpful in managing his urinary symptoms.  He has seen a urologist (Dr. Goldberg) and would be willing to return to see him again. Patient recalls that he had testing done in the past but cannot recall what was found, if anything.     Seborrheic dermatitis: Not consistently using any of the topical products he has including ketoconazole cream and shampoo, fluocinolone scalp oil. Patient states he does use Head and Shoulders products and thinks this helps. States he tried ketoconazole cream on his face but not consistently because he didn't think it helped.     Today's Vitals: BP (!) 145/92   Pulse 87   SpO2 97%     ASSESSMENT:              Current medications were reviewed today as discussed above.      Medication Adherence: fair, needs improvement - see below (topical products)    Parkinson's Disease:  Needs improvement.  Patient may benefit from a  higher dose of Rytary to manage his worsening PD symptoms.  Would prefer to not increase the ropinirole again given his previous hallucinations on the higher dose.  Defer to PCP if diarrhea returns.    Urinary incontinence/BPH: Needs improvement.  Patient may benefit from another visit with urology to discuss his urinary issues and potentially consider other therapies.    Seborrheic dermatitis: Needs improvement.  Patient may benefit from using the ketoconazole cream and shampoo more regularly in order to manage his ongoing dermatologic issues.  We discussed that the cream can be used on a daily basis and that the shampoo can be used every time he showers even if this is not every day. Fluocinolone was prescribed to be used once a week and he could additionally try using this.      PLAN:                  1. Increase Rytary to 245 mg capsule take 3 capsules 3 times daily.   2. Use ketoconazole cream on affected areas daily.   3. Use ketoconazole shampoo with each shower.   4. Fluocinolone oil can be used on hair once a week as well.     I spent 30 minutes with this patient today. All changes were made via collaborative practice agreement with Dr. Villasenor. A copy of the visit note was provided to the patient's primary care provider.     Will follow up in 3 months.    The patient was given a summary of these recommendations as an after visit summary.    Chart documentation was completed in part with Dragon voice-recognition software. Even though reviewed, some grammatical, spelling, and word errors may remain.    Sheree Neumann, Pharm.D.  Medication Therapy Management Pharmacist  Phone: [phone]

## 2019-03-28 NOTE — PATIENT INSTRUCTIONS
Recommendations from today's MTM visit:                                                      1. See Dr. Goldberg again for urology since the finasteride and tamsulosin isn't working.    2. Use the ketoconazole cream on a daily basis and ketoconazole shampoo every time you shower. You can continue the Head and Shoulders as well.     3. The fluconinide can be used once a week on your hair as well.     4. We are going to increase the Rytary pill dose to 245 mg capsules but you will still take 3 pills 3 times/day.    Next MTM visit: when you see Dr. Villasenor again    To schedule another MTM appointment, please call the clinic directly or you may call the MTM scheduling line at [phone] or toll-free at [phone].     My Clinical Pharmacist's contact information:                                                      It was a pleasure talking with you today!  Please feel free to contact me with any questions or concerns you have.      Sheree Neumann, Pharm.D.  Medication Therapy Management Pharmacist  Phone: [phone]    You may receive a survey about the MTM services you received by email and/or US Mail.  I would appreciate your feedback to help me serve you better in the future. Your comments will be anonymous.

## 2019-03-28 NOTE — LETTER
3/28/2019      RE: Eamon Whalen  [address]  [place] [address]       Diagnosis/Summary/Recommendations:    PATIENT: Eamon Whalen  63 year old male     : 1955    MERCEDES: 2019     Medications     9am 3pm 9pm         Aspirin 325mg (may go back to 81mg) 1               Carbidopa-levodopa ER rytary 195mg 3 3 3         Finasteride proscar 5mg 1           Fluocinolone acetonide derma-smoothe/FS Body 0.01% oil   has not tried               Ketoconazole nizoral 2% cream  not sure if using                Ketoconazole nizoral 2% shampoo                  Lactobacillus acidophilus probiotic             Latanoprost xalatan 0.005% opthalmic solution       Both eyes         Mometasone elocon 0.1% cream NOT SURE IF USING            Quetiapine seroquel 25mg     1 (not taking?)       Ropinirole requip XL 2mg TB24       1         Tamsulosin flomax 0.4mg 1               Terbinafine lamisil 1% cream Not using           Timolol timoptic 0.5% ophthalmic solution Both eyes                                                                          History obtained from patient      Coding statement:   Duration of  Services: patient care and care coordination was 25 minutes  Greater than 50% of this visit was spent in counseling and coordination of care.     Edison Villasenor MD     ______________________________________    Last visit date and details:      Parkinson  icd  May need to reduce requip xl to 2mg      Had CTangio, head ct and ecg and xray of his chest.      He is on requip XL 2mg  He is on seroquel 25mg at night  He is using rytary 195mg 3 capsules 3/day  He has some tremor that is controllable     There was a question of hallucinations/confusion/delusions/etc  Details a bit murky but seems okay     He continues to have skin problems.      Medications     9am 3pm 9pm         Aspirin 325mg (may go back to 81mg) 1               Bisacodyl dulcolax 5mg EC tablet Not using            Carbidopa-levodopa ER rytary  195mg 3 3 3         Finasteride proscar 5mg 1           Fluocinolone acetonide derma-smoothe/FS Body 0.01% oil   has not tried               Ketoconazole nizoral 2% cream  not sure if using                Ketoconazole nizoral 2% shampoo                  Latanoprost xalatan 0.005% opthalmic solution       Both eyes         Mometasone elocon 0.1% cream NOT SURE IF USING            Quetiapine seroquel 25mg     1       Ropinirole requip XL 2mg TB24       1         Saw palmetto sernoa repens STOPPED             Tamsulosin flomax 0.4mg 1               Terbinafine lamisil 1% cream Not using           Timolol timoptic 0.5% ophthalmic solution Both eyes                                                                        History obtained from patient     PLAN  Podiatry referral for nails and toenail fungus  Consider going every other night of the requip XL and see if can go off it.   He is using 2mg dose now of requip XL     Continue on the 25mg of seroquel  This can be increased if needed in the future.      Continue on the rtyary 195mg 3 capsules  Times per day      Visit with Berry Fuller D today     Return visit.,           ______________________________________      Patient was asked about 14 Review of systems including changes in vision (dry eyes, double vision), hearing, heart, lungs, musculoskeletal, depression, anxiety, snoring, RBD, insomnia, urinary frequency, urinary urgency, constipation, swallowing problems, hematological, ID, allergies, skin problems: seborrhea, endocrinological: thyroid, diabetes, cholesterol; balance, weight changes, and other neurological problems and these were not significant at this time except for   Patient Active Problem List   Diagnosis     Paralysis agitans (H)     Wears glasses     Balance problems     Constipation     GERD (gastroesophageal reflux disease)     Urinary urgency     Insomnia     Seborrhea     Lumbago     Hearing loss     Family history of Parkinson's disease      Central retinal vein occlusion     Choroidal nevus     Vision problem -- Right Eye     ED (erectile dysfunction)     Other specified glaucoma     Abnormal electrocardiogram     Bifascicular block     Prolonged Q-T interval on ECG     History of EMG     Encounter for examination for driving license     Parkinson disease (H)     Elevated prostate specific antigen (PSA)     Family history of epilepsy and other diseases of the nervous system     Gastroesophageal reflux disease     Impotence of organic origin     Other abnormalities of gait and mobility     Personal history of other medical treatment (CODE)     Benign neoplasm of choroid     Weakness          Allergies   Allergen Reactions     Cats      Dogs      Past Surgical History:   Procedure Laterality Date     NO HISTORY OF SURGERY       Past Medical History:   Diagnosis Date     Abnormal electrocardiogram 3/23/2017     Balance problems      Bifascicular block 3/23/2017     Central retinal vein occlusion 4/11/2014     Choroidal nevus 4/11/2014     Constipation      Elevated prostate specific antigen (PSA) 2/20/2018     Family history of Parkinson's disease      GERD (gastroesophageal reflux disease)      Glaucoma 6/4/2015     Hearing loss      History of EMG 2/20/2018    [facility]  Nerve Conduction & EMG Report  Patient:       Eamon Whalen Patient ID:    [id number] Gender:        Male YOB: 1955 Age:           62 Years 2 Months    History & Examination:  62 year old man with history of Parkinson's disease, restless leg syndrome, and paresthesias in feet and legs. Query polyneuropathy. His clinical examination      Lumbago      Parkinson's disease (H)      Prolonged Q-T interval on ECG 3/23/2017     Urinary urgency      Wears glasses      Social History     Socioeconomic History     Marital status: Single     Spouse name: Not on file     Number of children: Not on file     Years of education: Not on file      Highest education level: Not on file   Occupational History     Occupation: farm     Employer: SELF EMPLOYED.   Social Needs     Financial resource strain: Not on file     Food insecurity:     Worry: Not on file     Inability: Not on file     Transportation needs:     Medical: Not on file     Non-medical: Not on file   Tobacco Use     Smoking status: Never Smoker     Smokeless tobacco: Never Used     Tobacco comment: cigar sometimes   Substance and Sexual Activity     Alcohol use: Yes     Comment: occ     Drug use: No     Sexual activity: Yes     Partners: Female   Lifestyle     Physical activity:     Days per week: Not on file     Minutes per session: Not on file     Stress: Not on file   Relationships     Social connections:     Talks on phone: Not on file     Gets together: Not on file     Attends Faith service: Not on file     Active member of club or organization: Not on file     Attends meetings of clubs or organizations: Not on file     Relationship status: Not on file     Intimate partner violence:     Fear of current or ex partner: Not on file     Emotionally abused: Not on file     Physically abused: Not on file     Forced sexual activity: Not on file   Other Topics Concern     Parent/sibling w/ CABG, MI or angioplasty before 65F 55M? Not Asked   Social History Narrative    1 daughter who is  and lives in [place]    She was pregnant with the 1st child and had some problems in the 6-7 month of pregnancy with     Baby that may have hydrocephalus as there was IUGR. The baby  after 3 months in 2011. She was encouraged her to undergo genetic testing which was negative and they are considering having more children.     He met a woman and may  a woman from [place] - who has [place] roots. 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 He met her 3 times. Her name is Tavia and lives in [facility]. They will be visiting within a day or so with her mother. She is 36 yrs old. She is not  "going back there.     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 Depending on the status of the main house may build new house.         [place] in his home town had parkinson    Germ[place] lived on the [place] side of town        [facility] -- [place] miles.         This was way before pesticides        Maternal grandfather     Dax Sorenson - [place] name     He would have to be brought out to a rocking chair    \"hardening of there arteries.         mother       Drug and lactation database from the [place] [employer] of Medicine:  http://toxnet.nlm.nih.gov/cgi-bin/sis/htmlgen?LACT      B/P: Data Unavailable, T: Data Unavailable, P: Data Unavailable, R: Data Unavailable 0 lbs 0 oz  There were no vitals taken for this visit., There is no height or weight on file to calculate BMI.  Medications and Vitals not listed above were documented in the cart and reviewed by me.     Current Outpatient Medications   Medication Sig Dispense Refill     aspirin 325 MG EC tablet 325mg tab by mouth daily @ 9am for a while (and then will go back to 81 mg tab by mouth daily @ 9am)       carbidopa-levodopa (RYTARY) 48. MG CPCR per CR capsule 3 capsule by mouth 3 times per day @ 9am, 3pm and 9pm  = 9/day 270 capsule 11     finasteride (PROSCAR) 5 MG tablet 5mg tab by mouth daily @ 9am 30 tablet 3     Fluocinolone Acetonide Scalp 0.01 % OIL oil Wet hair and apply to scalp and leave in for 4 hours and then wash off. May use 1 times per week 1 Bottle 11     ketoconazole (NIZORAL) 2 % cream Apply topically daily Apply topical daily as needed 60 g 11     ketoconazole (NIZORAL) 2 % shampoo Apply to the affected area and wash off after 5 minutes. 120 mL 11     Lactobacillus (ACIDOPHILUS PROBIOTIC) 0.5 MG TABS   3     latanoprost (XALATAN) 0.005 % ophthalmic solution One drop in both eyes @ 9pm 2.5 mL 1     " mometasone (ELOCON) 0.1 % cream NOT SURE IF USING  2     QUEtiapine (SEROQUEL) 25 MG tablet 25MG BY MOUTH NIGHTLY @ 9PM. 90 tablet 5     rOPINIRole HCl (REQUIP) 2 MG TB24 tablet 2mg dose by mouth nightly @ 9pm 90 tablet 11     tamsulosin (FLOMAX) 0.4 MG capsule 0.4mg tab by mouth daily @ 9am 90 capsule 3     timolol (TIMOPTIC) 0.5 % ophthalmic solution 1 drop both eyes daily @ 9am 1 Bottle 11         Edison Villasenor MD

## 2019-03-28 NOTE — LETTER
3/28/2019      RE: Eamon Whalen  [address]  [place] [address]       Diagnosis/Summary/Recommendations:    PATIENT: Eamon Whalen  63 year old male     : 1955    MERCEDES:     2019     Medications     9am 3pm 9pm         Aspirin 325mg (may go back to 81mg) 1               Carbidopa-levodopa ER rytary 195mg 3 3 3         Finasteride proscar 5mg 1           Fluocinolone acetonide derma-smoothe/FS Body 0.01% oil   has not tried               Ketoconazole nizoral 2% cream  not sure if using                Ketoconazole nizoral 2% shampoo                  Lactobacillus acidophilus probiotic             Latanoprost xalatan 0.005% opthalmic solution       Both eyes         Mometasone elocon 0.1% cream NOT SURE IF USING            Quetiapine seroquel 25mg     1 (not taking?)       Ropinirole requip XL 2mg TB24       1         Tamsulosin flomax 0.4mg 1               Terbinafine lamisil 1% cream Not using           Timolol timoptic 0.5% ophthalmic solution Both eyes                                                                        History obtained from patient        Documentation of Face-to-Face and Certification for Home Health Services     Patient: Eamon Whalen   YOB: 1955  MR Number: [id number]  Today's Date: 3/28/2019    I certify that patient: Eamon Whalen is under my care and that I, or a nurse practitioner or physician's assistant working with me, had a face-to-face encounter that meets the physician face-to-face encounter requirements with this patient on: 3/28/2019.    This encounter with the patient was in whole, or in part, for the following medical condition, which is the primary reason for home health care: parkinson.    I certify that, based on my findings, the following services are medically necessary home health services: Nursing, Occupational Therapy, Physical Therapy, Speech Language Therapy and social work.    My clinical findings support the need for the above  services because: Nurse is needed: To provide assessment and oversight required in the home to assure adherence to the medical plan due to: parkinson.., Occupational Therapy Services are needed to assess and treat cognitive ability and address ADL safety due to impairment in cognition., Physical Therapy Services are needed to assess and treat the following functional impairments: balance/movement., Speech Therapy Services are needed to assess and treat impairments in language and/or swallow functions due to swallowing and speech. and other issues. Also needs to see  to help address living issues related to impairments related to cognitive and motor changes.    Further, I certify that my clinical findings support that this patient is homebound (i.e. absences from home require considerable and taxing effort and are for medical reasons or [facility] services or infrequently or of short duration when for other reasons) because: Requires assistance of another person or specialized equipment to access medical services because patient:  of dementia..    Based on the above findings. I certify that this patient is confined to the home and needs intermittent skilled nursing care, physical therapy and/or speech therapy.  The patient is under my care, and I have initiated the establishment of the plan of care.  This patient will be followed by a physician who will periodically review the plan of care.  Physician/Provider to provide follow up care: Bernardino Jhon    Responsible Medicare certified PECOS Physician: josue  Physician Signature: See electronic signature associated with these discharge orders.  Date: 3/28/2019        Coding statement:   Duration of  Services: patient care and care coordination was 15 minutes  Greater than 50% of this visit was spent in counseling and coordination of care.     Edison Villasenor MD     ______________________________________    Last visit date and details:      Parkinson  icd  May  need to reduce requip xl to 2mg      Had CTangio, head ct and ecg and xray of his chest.      He is on requip XL 2mg  He is on seroquel 25mg at night  He is using rytary 195mg 3 capsules 3/day  He has some tremor that is controllable     There was a question of hallucinations/confusion/delusions/etc  Details a bit murky but seems okay     He continues to have skin problems.      Medications     9am 3pm 9pm         Aspirin 325mg (may go back to 81mg) 1               Bisacodyl dulcolax 5mg EC tablet Not using            Carbidopa-levodopa ER rytary 195mg 3 3 3         Finasteride proscar 5mg 1           Fluocinolone acetonide derma-smoothe/FS Body 0.01% oil   has not tried               Ketoconazole nizoral 2% cream  not sure if using                Ketoconazole nizoral 2% shampoo                  Latanoprost xalatan 0.005% opthalmic solution       Both eyes         Mometasone elocon 0.1% cream NOT SURE IF USING            Quetiapine seroquel 25mg     1       Ropinirole requip XL 2mg TB24       1         Saw palmetto sernoa repens STOPPED             Tamsulosin flomax 0.4mg 1               Terbinafine lamisil 1% cream Not using           Timolol timoptic 0.5% ophthalmic solution Both eyes                                                                        History obtained from patient     PLAN  Podiatry referral for nails and toenail fungus  Consider going every other night of the requip XL and see if can go off it.   He is using 2mg dose now of requip XL     Continue on the 25mg of seroquel  This can be increased if needed in the future.      Continue on the rtyary 195mg 3 capsules  Times per day      Visit with Sheree Neumann, Berry D today     Return visit.,           ______________________________________      Patient was asked about 14 Review of systems including changes in vision (dry eyes, double vision), hearing, heart, lungs, musculoskeletal, depression, anxiety, snoring, RBD, insomnia, urinary frequency,  urinary urgency, constipation, swallowing problems, hematological, ID, allergies, skin problems: seborrhea, endocrinological: thyroid, diabetes, cholesterol; balance, weight changes, and other neurological problems and these were not significant at this time except for   Patient Active Problem List   Diagnosis     Paralysis agitans (H)     Wears glasses     Balance problems     Constipation     GERD (gastroesophageal reflux disease)     Urinary urgency     Insomnia     Seborrhea     Lumbago     Hearing loss     Family history of Parkinson's disease     Central retinal vein occlusion     Choroidal nevus     Vision problem -- Right Eye     ED (erectile dysfunction)     Other specified glaucoma     Abnormal electrocardiogram     Bifascicular block     Prolonged Q-T interval on ECG     History of EMG     Encounter for examination for driving license     Parkinson disease (H)     Elevated prostate specific antigen (PSA)     Family history of epilepsy and other diseases of the nervous system     Gastroesophageal reflux disease     Impotence of organic origin     Other abnormalities of gait and mobility     Personal history of other medical treatment (CODE)     Benign neoplasm of choroid     Weakness          Allergies   Allergen Reactions     Cats      Dogs      Past Surgical History:   Procedure Laterality Date     NO HISTORY OF SURGERY       Past Medical History:   Diagnosis Date     Abnormal electrocardiogram 3/23/2017     Balance problems      Bifascicular block 3/23/2017     Central retinal vein occlusion 4/11/2014     Choroidal nevus 4/11/2014     Constipation      Elevated prostate specific antigen (PSA) 2/20/2018     Family history of Parkinson's disease      GERD (gastroesophageal reflux disease)      Glaucoma 6/4/2015     Hearing loss      History of EMG 2/20/2018    [facility]  Nerve Conduction & EMG Report  Patient:       Eamon Whalen Patient ID:    [id number] Gender:         Male YOB: 1955 Age:           62 Years 2 Months    History & Examination:  62 year old man with history of Parkinson's disease, restless leg syndrome, and paresthesias in feet and legs. Query polyneuropathy. His clinical examination      Lumbago      Parkinson's disease (H)      Prolonged Q-T interval on ECG 3/23/2017     Urinary urgency      Wears glasses      Social History     Socioeconomic History     Marital status: Single     Spouse name: Not on file     Number of children: Not on file     Years of education: Not on file     Highest education level: Not on file   Occupational History     Occupation: farm     Employer: SELF EMPLOYED.   Social Needs     Financial resource strain: Not on file     Food insecurity:     Worry: Not on file     Inability: Not on file     Transportation needs:     Medical: Not on file     Non-medical: Not on file   Tobacco Use     Smoking status: Never Smoker     Smokeless tobacco: Never Used     Tobacco comment: cigar sometimes   Substance and Sexual Activity     Alcohol use: Yes     Comment: occ     Drug use: No     Sexual activity: Yes     Partners: Female   Lifestyle     Physical activity:     Days per week: Not on file     Minutes per session: Not on file     Stress: Not on file   Relationships     Social connections:     Talks on phone: Not on file     Gets together: Not on file     Attends Latter-day service: Not on file     Active member of club or organization: Not on file     Attends meetings of clubs or organizations: Not on file     Relationship status: Not on file     Intimate partner violence:     Fear of current or ex partner: Not on file     Emotionally abused: Not on file     Physically abused: Not on file     Forced sexual activity: Not on file   Other Topics Concern     Parent/sibling w/ CABG, MI or angioplasty before 65F 55M? Not Asked   Social History Narrative    1 daughter who is  and lives in [place]    She was pregnant with the 1st  "child and had some problems in the 6-7 month of pregnancy with     Baby that may have hydrocephalus as there was IUGR. The baby  after 3 months in 2011. She was encouraged her to undergo genetic testing which was negative and they are considering having more children.     He met a woman and may  a woman from [place] - who has [place] roots. 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 He met her 3 times. Her name is Tavia and lives in [facility]. They will be visiting within a day or so with her mother. She is 36 yrs old. She is not going back there.     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 Depending on the status of the main house may build new house.         [place] in his home town had parkinson    [place] lived [place] [facility] [place].         This was way before pesticides        Maternal grandfather     Dax Sorenson - Turkish name     He would have to be brought out to a rocking chair    \"hardening of there arteries.         mother       Drug and lactation database from the [place] National Library of Medicine:  http://toxnet.nlm.nih.gov/cgi-bin/sis/htmlgen?LACT      B/P: Data Unavailable, T: Data Unavailable, P: Data Unavailable, R: Data Unavailable 0 lbs 0 oz  There were no vitals taken for this visit., There is no height or weight on file to calculate BMI.  Medications and Vitals not listed above were documented in the cart and reviewed by me.     Current Outpatient Medications   Medication Sig Dispense Refill     aspirin 325 MG EC tablet 325mg tab by mouth daily @ 9am for a while (and then will go back to 81 mg tab by mouth daily @ 9am)       carbidopa-levodopa (RYTARY) 48. MG CPCR per CR capsule 3 capsule by mouth 3 times per day @ 9am, 3pm and 9pm  = 9/day 270 capsule 11     finasteride (PROSCAR) 5 MG tablet " 5mg tab by mouth daily @ 9am 30 tablet 3     Fluocinolone Acetonide Scalp 0.01 % OIL oil Wet hair and apply to scalp and leave in for 4 hours and then wash off. May use 1 times per week 1 Bottle 11     ketoconazole (NIZORAL) 2 % cream Apply topically daily Apply topical daily as needed 60 g 11     ketoconazole (NIZORAL) 2 % shampoo Apply to the affected area and wash off after 5 minutes. 120 mL 11     Lactobacillus (ACIDOPHILUS PROBIOTIC) 0.5 MG TABS   3     latanoprost (XALATAN) 0.005 % ophthalmic solution One drop in both eyes @ 9pm 2.5 mL 1     mometasone (ELOCON) 0.1 % cream NOT SURE IF USING  2     QUEtiapine (SEROQUEL) 25 MG tablet 25MG BY MOUTH NIGHTLY @ 9PM. 90 tablet 5     rOPINIRole HCl (REQUIP) 2 MG TB24 tablet 2mg dose by mouth nightly @ 9pm 90 tablet 11     tamsulosin (FLOMAX) 0.4 MG capsule 0.4mg tab by mouth daily @ 9am 90 capsule 3     timolol (TIMOPTIC) 0.5 % ophthalmic solution 1 drop both eyes daily @ 9am 1 Bottle 11         Edison Villasenor MD

## 2019-04-01 ENCOUNTER — PATIENT OUTREACH (OUTPATIENT)
Dept: CARE COORDINATION | Facility: CLINIC | Age: 64
End: 2019-04-01

## 2019-04-01 NOTE — PROGRESS NOTES
Social Work Telephone Message Note  [facility]    Patient Name:  Eamon Whalen  /Age:  1955 (63 year old)    Referral Source: Dr Villasenor/Jolene Barnett, FAYE  Reason for Referral:  Home care     contacted Patient via telephone on 3/20/2019 and 2019. Messages were left on Patient's voicemail to be called back.  will await Patient's return phone call to discuss home care arrangements.     MARIA C Unger, [facility]    [facility]  [facility]  [phone]

## 2019-04-04 ENCOUNTER — TELEPHONE (OUTPATIENT)
Dept: NEUROLOGY | Facility: CLINIC | Age: 64
End: 2019-04-04

## 2019-04-04 ENCOUNTER — PATIENT OUTREACH (OUTPATIENT)
Dept: CARE COORDINATION | Facility: CLINIC | Age: 64
End: 2019-04-04

## 2019-04-04 NOTE — PROGRESS NOTES
Social Work Follow-Up  [facility]    Data/Intervention:  Patient Name:  Eamon Whalen  /Age:  1955 (63 year old)    Reason for Follow-Up:  Home care    Collaborated With:    -Pt's dtr Marlin (ELOISA on file)  -Eamon  [facility] ([facility]) [phone]    Intervention/Education/Resources Provided:  I contacted Marlin, Pt's dtr as he was not responding to my phone messages. She contacted her father and asked him to call me re home care services.  Discussed home care ordered and the options in his area. It appeared on the Medicare.gov website that [facility] ([facility]) was the most full service home care in his area. Pt didn't have any preferences and agreed to referral to [facility].  Referral was faxed to them. They likely cannot provide social work services. Discussed that Pt may need to be referred to his county for CADI services if appropriate. They indicated that they could address that with him.     Assessment/Plan:  Pt emailed name/# of home care agency per his request.  Encouraged him to contact me if further assistance is needed.   Previously provided patient/family with writer's contact information and availability.       MARIA C Unger, [facility]    [facility]  [phone]

## 2019-04-04 NOTE — TELEPHONE ENCOUNTER
Nurse received this message from Claritza Bonner's (): He wants to talk with you re the affects of his medication changes. Can you please call him? Thanks

## 2019-05-03 ENCOUNTER — TELEPHONE (OUTPATIENT)
Dept: NEUROLOGY | Facility: CLINIC | Age: 64
End: 2019-05-03

## 2019-05-03 NOTE — TELEPHONE ENCOUNTER
[facility] Call Center    Phone Message    May a detailed message be left on voicemail: yes    Reason for Call: Other: Pt calling asking if he can get the last office notes from 3/28/19 appointment mailed to his home address.      Action Taken: Message routed to:  [facility] ([facility]): liban neurology

## 2019-05-06 ENCOUNTER — TELEPHONE (OUTPATIENT)
Dept: PHARMACY | Facility: CLINIC | Age: 64
End: 2019-05-06

## 2019-05-06 NOTE — TELEPHONE ENCOUNTER
Patient calling to confirm his upcoming appointment with Dr. Villasenor and Sheree Neumann, PharmD on 5/8/19 and that he has a ride to clinic. He is requesting a call back on Monday 5/6/19.     It appears as though the patient as not scheduled follow up appointments. Will route to the clinic coordinators to schedule appointments with Dr. Villasenor and me.     Sheree Neumann, Pharm.D.  Medication Therapy Management Pharmacist  Phone: [phone]

## 2019-05-06 NOTE — TELEPHONE ENCOUNTER
I informed Eamon I will be mailing out his office visit note today. Eamon was confused about when he would scheduled next to see Dr. Villasenor and Dr. Neumann. I informed him that per his conversation with Sheree Neumann earlier the Clinic coordinators will be calling him to get him schedule for the follow-up visits within the next couple days.

## 2019-05-28 ENCOUNTER — TRANSFERRED RECORDS (OUTPATIENT)
Dept: HEALTH INFORMATION MANAGEMENT | Facility: CLINIC | Age: 64
End: 2019-05-28

## 2019-05-30 ENCOUNTER — DOCUMENTATION ONLY (OUTPATIENT)
Dept: NEUROLOGY | Facility: CLINIC | Age: 64
End: 2019-05-30

## 2019-05-30 NOTE — PROGRESS NOTES
Received records from [facility] 5/30/19, records were sent to be scanned 5/30/19    Yandy Hicks CMA

## 2019-06-25 ENCOUNTER — TELEPHONE (OUTPATIENT)
Dept: PHARMACY | Facility: CLINIC | Age: 64
End: 2019-06-25

## 2019-06-25 NOTE — TELEPHONE ENCOUNTER
"Patient called and left  for MTM pharmacist. In the VM he states that he is experiencing a \"feeling of fullness in my head\" and \"faintness\" especially after doing exercises. He is wondering \"what is happening.\"    He also states he won't be making it to the [facility] [place] as often and he is wondering what else he can try for medications. He states that a nurse recommended increasing Rytary but he would prefer to not increase Rytary.     Will route message to RN care coordinators to address the new symptoms and I can get involved as needed with medication recommendations. I cannot do full MTM visits by phone with this patient given restrictions with his insurance.    Sheree Neumann, Pharm.D.  Medication Therapy Management Pharmacist  Phone: [phone]  "

## 2019-07-29 DIAGNOSIS — G20.A1 PARALYSIS AGITANS (H): ICD-10-CM

## 2019-07-29 NOTE — TELEPHONE ENCOUNTER
Last Written Prescription Date:  11/08/18  Last Fill Quantity: 90,  # refills: 5   Last office visit: 3/28/2019 with prescribing provider:     Future Office Visit:

## 2019-07-31 ENCOUNTER — TELEPHONE (OUTPATIENT)
Dept: NEUROLOGY | Facility: CLINIC | Age: 64
End: 2019-07-31

## 2019-07-31 RX ORDER — QUETIAPINE FUMARATE 25 MG/1
TABLET, FILM COATED ORAL
Qty: 30 TABLET | Refills: 11 | Status: SHIPPED | OUTPATIENT
Start: 2019-07-31 | End: 2020-01-30

## 2019-07-31 NOTE — TELEPHONE ENCOUNTER
carbidopa-levodopa ER (RYTARY) 61. MG CPCR per CR capsule 270 capsule 11 3/28/2019  --   Sig - Route: Take 3 capsules by mouth 3 times daily - Oral     I informed Madelyn he is taking the Rytary as it is written above.

## 2019-07-31 NOTE — TELEPHONE ENCOUNTER
Health Call Center    Phone Message    May a detailed message be left on voicemail: yes    Reason for Call: Medication Question or concern regarding medication   Prescription Clarification  Name of Medication:carbidopa-levodopa ER (RYTARY) 61. MG      Prescribing Provider: Dr. Villasenor   Pharmacy:    What on the order needs clarification? Madelyn from [facility] needs clairification on dosing as the pill bottle says one thing, the orders say another.  Please call her back at [phone]          Action Taken: Message routed to:  [facility] ([facility]): Neuro

## 2019-08-29 ENCOUNTER — TELEPHONE (OUTPATIENT)
Dept: NEUROLOGY | Facility: CLINIC | Age: 64
End: 2019-08-29

## 2019-08-29 NOTE — TELEPHONE ENCOUNTER
Prior Authorization Retail Medication Request    Medication/Dose: rOPINIRole HCl (REQUIP) 2 MG TB24 tablet  ICD code (if different than what is on RX):  G20  Previously Tried and Failed:  See Chart   Rationale:  See Chart     Insurance Name:  BLUE PLUS [facility]   Insurance ID:  [id number]       Pharmacy Information (if different than what is on RX)  Name:    Phone:

## 2019-08-30 ENCOUNTER — MEDICAL CORRESPONDENCE (OUTPATIENT)
Dept: HEALTH INFORMATION MANAGEMENT | Facility: CLINIC | Age: 64
End: 2019-08-30

## 2019-09-05 ENCOUNTER — MEDICAL CORRESPONDENCE (OUTPATIENT)
Dept: HEALTH INFORMATION MANAGEMENT | Facility: CLINIC | Age: 64
End: 2019-09-05

## 2019-09-29 ENCOUNTER — MEDICAL CORRESPONDENCE (OUTPATIENT)
Dept: HEALTH INFORMATION MANAGEMENT | Facility: CLINIC | Age: 64
End: 2019-09-29

## 2019-10-10 ENCOUNTER — TELEPHONE (OUTPATIENT)
Dept: NEUROLOGY | Facility: CLINIC | Age: 64
End: 2019-10-10

## 2019-10-10 NOTE — TELEPHONE ENCOUNTER
[facility]  CALL   HX :  LAST SEEN  3/28/19 Edison Villasenor MD  Neurology   Parkinson     CALL:  Pt calls to report concerns relating to mold exposure. Removed from home 2 weeks ago. He reports episodes of weakness several times/ day.  S/S  Mold exposure      a runny or blocked nose.     watery, red eyes.    a dry cough.    skin rashes.    a sore throat.    sinusitis.    wheezing.  fatigue.     PLAN : Per pt request msg sent to Neurology. Pt request phone call. Pt understands to seek med attention if S/S change /worsen. [facility] (8 min)

## 2019-10-11 NOTE — TELEPHONE ENCOUNTER
I informed Eamon of Dr. Villasenor's recommendation.    Eamon asked if there was any research regarding mold exposure that we know of. I informed him there was not any that we are aware of. This would be best asked of his primary care provider. I informed him he should see his PCP due to his worsening symptoms.    Plan:  1. Eamon will schedule to see his PCP.    2. Eamon will schedule a follow-up with Dr. Villasenor regarding his Parkinson's disease. I informed him we would not be seeing him regarding his mold exposure or be able to give recommendations about this. He needs to go through Dr. John.

## 2019-10-11 NOTE — TELEPHONE ENCOUNTER
"Situation:  Eamon Whalen is a 63 year old male who receives support for Parkinson's disease. Eamon calls today with concerns for mold exposure and increased episodes of weakness several times a day.    Background:    Eamon stated he had mold in his basement, this was cleared out 2 weeks ago. It is suspected to have been there for 2-3 months.    Patient is taking:  carbidopa-levodopa ER (RYTARY) 61. MG CPCR per CR capsule 270 capsule 11 3/28/2019  --   Sig - Route: Take 3 capsules by mouth 3 times daily - Oral     QUEtiapine (SEROQUEL) 25 MG tablet 30 tablet 11 2019  No   Sig: Take one-half tablet by mouth nightly. May increase to one tablet nightly after 1-2 weeks if not effective.     rOPINIRole HCl (REQUIP) 2 MG TB24 tablet 90 tablet 11 2018  No   Simg dose by mouth nightly @ 9pm     Assessment:  He has had a cough (going away) and some dizziness that comes and goes. He feels like his legs have been getting weaker and weaker. Today it is difficult for him to stand from a chair or walk a fair amount. He states his breathing is not normal, a few times a day it changes- he describes it as \"my diaphragm is fighting me from taking a breath.\" He refers to this as wheezing.  Tremors are present 1-2 x daily (usually in the left leg and left arm) this will last 10-15 minutes.    \"It was some kind of white mold.\"    Have you seen your PCP? Yes - Dr. John 1 1/2 months ago, he is aware of the mold exposure    How long have you had the increase in symptoms?    Over the past 6 months, they have gotten much worse in the past 2 months.    Eamon's main concerns:  \"How can we fix this?\" Eamon stated the weakness has gotten much worse especially in his legs.  \"Does Dr. Villasenor know anything about mold exposure?\"    Are you having any new symptoms such as,   Fever/chills  No   Recent illness  No   Dehydration  No   Eating OK  Yes   Hit your head  No, denies falling in the past     couple of months   Mental " "stress  Yes   Emotional stress yes   Hallucinations  No   Urinating OK?  \"too much\" urinates 3-6 times a night on average for the past 2 years.  Urgency present   Any discomfort while urinating? No    Plan/recommendation:  1. I advised Eamon to contact his PCP and let him know that his dizziness, breathing, and weakness have worsened since he was last seen, he should ask his primary regarding next steps given his mold exposure.  2. I will discuss with Dr. Villasenor and Sheree Neumann about the weakness.  3. Eamon will start using his U-step walker at all times for safety. He was instructed to sit and rest when he feels weakness coming on when walking.      "

## 2019-10-31 NOTE — TELEPHONE ENCOUNTER
Central Prior Authorization Team   Phone: [phone]      PA Initiation    Medication: rOPINIRole HCl (REQUIP) 2 MG TB24 tablet  Insurance Company: [employer] - Phone [phone] Fax [phone]  Pharmacy Filling the Rx: [id number], [place] - [address] [place] [address]  Filling Pharmacy Phone: [phone]  Filling Pharmacy Fax:    Start Date: 10/31/2019

## 2019-10-31 NOTE — TELEPHONE ENCOUNTER
[facility] Call Center    Phone Message    May a detailed message be left on voicemail: yes    Reason for Call: Other: Angella calling to request a call back. She would like to know the status of pts medications PA. Please call her back to discuss.      Action Taken: Message routed to:  [facility] ([facility]): liban neuro

## 2019-11-01 NOTE — TELEPHONE ENCOUNTER
Prior Authorization Approval    Authorization Effective Date: 8/1/2019  Authorization Expiration Date: 11/1/2020  Medication: rOPINIRole HCl (REQUIP) 2 MG TB24 tablet-APPROVED  Approved Dose/Quantity:   Reference #:     Insurance Company: [employer] - Phone [phone] Fax [phone]  Expected CoPay:       CoPay Card Available:      Foundation Assistance Needed:    Which Pharmacy is filling the prescription (Not needed for infusion/clinic administered): [id number] ([facility], [place] - [address] [place] [address]  Pharmacy Notified: Yes-Pharmacy will notify patient when ready.  Patient Notified: No

## 2019-11-06 ENCOUNTER — HEALTH MAINTENANCE LETTER (OUTPATIENT)
Age: 64
End: 2019-11-06

## 2019-11-10 DIAGNOSIS — G20.A1 PARALYSIS AGITANS (H): ICD-10-CM

## 2019-11-11 RX ORDER — ROPINIROLE 2 MG/1
TABLET, FILM COATED, EXTENDED RELEASE ORAL
Qty: 30 TABLET | Refills: 5 | Status: SHIPPED | OUTPATIENT
Start: 2019-11-11 | End: 2020-05-04

## 2019-11-11 NOTE — TELEPHONE ENCOUNTER
Rx Authorization:    Requested Medication/ Dose 2mg    Date last refill ordered: 11/8/18    Quantity ordered: 90tabs    # refills: 11    Date of last clinic visit with ordering provider: 3/28/19    Date of next clinic visit with ordering provider: f/u mos    All pertinent protocol data (lab date/result):     Include pertinent information from patients message:

## 2020-01-14 RX ORDER — ALBUTEROL SULFATE 90 UG/1
AEROSOL, METERED RESPIRATORY (INHALATION)
Refills: 1 | COMMUNITY
Start: 2019-08-12 | End: 2020-01-31

## 2020-01-14 RX ORDER — LATANOPROST 50 UG/ML
1 SOLUTION/ DROPS OPHTHALMIC AT BEDTIME
COMMUNITY
Start: 2018-02-20 | End: 2020-01-22

## 2020-01-14 RX ORDER — LEVODOPA AND CARBIDOPA 195; 48.75 MG/1; MG/1
CAPSULE, EXTENDED RELEASE ORAL
Refills: 11 | COMMUNITY
Start: 2019-03-19 | End: 2020-01-22

## 2020-01-14 RX ORDER — IBUPROFEN 600 MG/1
1 TABLET, FILM COATED ORAL
COMMUNITY
Start: 2016-08-17 | End: 2020-01-30

## 2020-01-14 RX ORDER — ASPIRIN 325 MG
1 TABLET ORAL
COMMUNITY
Start: 2015-03-21 | End: 2020-01-30

## 2020-01-14 RX ORDER — TERBINAFINE HYDROCHLORIDE 250 MG/1
1 TABLET ORAL
COMMUNITY
Start: 2018-03-06 | End: 2020-01-30

## 2020-01-14 RX ORDER — ACETAMINOPHEN 160 MG
1 TABLET,DISINTEGRATING ORAL DAILY
Refills: 3 | COMMUNITY
Start: 2019-11-02 | End: 2020-10-27

## 2020-01-14 RX ORDER — TADALAFIL 20 MG/1
TABLET ORAL
Refills: 12 | COMMUNITY
Start: 2019-10-21 | End: 2020-10-27

## 2020-01-23 NOTE — PROGRESS NOTES
Diagnosis/Summary/Recommendations:    PATIENT: Eamon Whalen  64 year old male     : 1955    MERCEDES: 2020    Pietro cab  Dr John's assistant was helping with this but the [employer] business was still confused.     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   Lives in [facility]     This past while he has had problems with urination that is affecting his sleep    Not sure about his prostate/bladder medications and if he is taking both the finasteride/proscar and tamsulosin/flomax.     Not sure if has been on mirabegron    He reportedly is going to see a urologist in [place] but he is unable to recall his        Medications     9am 3pm 9pm         Aspirin 81mg 1               Carbidopa-levodopa ER rytary 245mg 3 3 3         Cholecalciferol vitamin d3 50 mcg (2000 units)        Finasteride proscar 5mg 1           Fluocinolone acetonide derma-smoothe/FS Body 0.01% oil   has not tried               Ibuprofen advil/motrin 600mg Not using       Ketoconazole nizoral 2% cream  not sure if using                Ketoconazole nizoral 2% shampoo   not sure is unit(s) sing               Lactobacillus acidophilus probiotic             Latanoprost xalatan 0.005% opthalmic solution       Both eyes         Mometasone elocon 0.1% cream NOT SURE IF USING            Quetiapine seroquel 25mg     1 at times       Ropinirole requip XL 2mg TB24       1         Tadalafil cialis 20mg  Not yet       Tamsulosin flomax 0.4mg 1               Terbinafine lamisil 250mg stopped       Terbinafine lamisil 1% cream Not using           Timolol timoptic 0.5% ophthalmic solution Both eyes               Ventolin  (90 base) mcg/act inhaler                                                        History obtained from patient    Blood pressure is still slightly elevated with recheck today - 148/84  He may want to see Dr. John to see if the blood pressure remains high  "- as this may impact whether it is worth trying mirabegron (myrbetriq) for his bladder - This medication usually is started at 25mg dose per day    Continue the ropinirole XL at 2mg for now    He should take the quetiapine 25mg at night and will need to continue to monitor for hallucinations as we may want to increase this at some point. He is presently taking it variably.    Right now not clear what if any prostate/bladder medications he is taking and so Sheree Neumann will reach out to the team that is setting up his pills to see if he is taking tamsulosin/flomax and/or finasteride/proscar.     He tentatively has a urology appointment in [place] but not clear with whom and when.     Return back in 3 months with me or Roshni Gonzales along with Sheree Neumann, Pharm D - as his insurance only allows pharmacist involvement face-to-face. He will need her involvement in addressing his nocturia if the urologist is not able to sort this out. The bladder issue is the significant factor affecting his sleep presently.     ecg today showed RBBB and QTc of 472    Addendum from Sheree Neumann:    \"Spoke with his home care nurse and updated the med list. He is taking Proscar and Flomax. The most recent BP's she had taken were pretty good so I think we could start Myrbetriq. I verified there isn't a QT prolongation risk. Do you want to prescribe Myrbetriq or do you want him to go to urology?     If prescribing Myrbetriq, it should be sent to [facility] pharmacy and we should have Jeaneth or Jolene fax the order to [facility] at [phone].\"      Coding statement:   Duration of  Services: patient care and care coordination was 25 minutes  Greater than 50% of this visit was spent in counseling and coordination of care.     Edison Villasenor MD     ______________________________________    Last visit date and details:      : 1955     MERCEDES:      2019     Medications     9am 3pm 9pm         Aspirin 325mg (may go back to 81mg) 1          "      Carbidopa-levodopa ER rytary 195mg 3 3 3         Finasteride proscar 5mg 1           Fluocinolone acetonide derma-smoothe/FS Body 0.01% oil   has not tried               Ketoconazole nizoral 2% cream  not sure if using                Ketoconazole nizoral 2% shampoo                  Lactobacillus acidophilus probiotic             Latanoprost xalatan 0.005% opthalmic solution       Both eyes         Mometasone elocon 0.1% cream NOT SURE IF USING            Quetiapine seroquel 25mg     1 (not taking?)       Ropinirole requip XL 2mg TB24       1         Tamsulosin flomax 0.4mg 1               Terbinafine lamisil 1% cream Not using           Timolol timoptic 0.5% ophthalmic solution Both eyes                                                                        History obtained from patient          Documentation of Face-to-Face and Certification for Home Health Services               Patient: Eamon Whalen   YOB: 1955  MR Number: [id number]  Today's Date: 3/28/2019     I certify that patient: Eamon Whalen is under my care and that I, or a nurse practitioner or physician's assistant working with me, had a face-to-face encounter that meets the physician face-to-face encounter requirements with this patient on: 3/28/2019.     This encounter with the patient was in whole, or in part, for the following medical condition, which is the primary reason for home health care: parkinson.     I certify that, based on my findings, the following services are medically necessary home health services: Nursing, Occupational Therapy, Physical Therapy, Speech Language Therapy and social work.     My clinical findings support the need for the above services because: Nurse is needed: To provide assessment and oversight required in the home to assure adherence to the medical plan due to: parkinson.., Occupational Therapy Services are needed to assess and treat cognitive ability and address ADL safety due to impairment in  cognition., Physical Therapy Services are needed to assess and treat the following functional impairments: balance/movement., Speech Therapy Services are needed to assess and treat impairments in language and/or swallow functions due to swallowing and speech. and other issues. Also needs to see  to help address living issues related to impairments related to cognitive and motor changes.     Further, I certify that my clinical findings support that this patient is homebound (i.e. absences from home require considerable and taxing effort and are for medical reasons or Orthodox services or infrequently or of short duration when for other reasons) because: Requires assistance of another person or specialized equipment to access medical services because patient:  of dementia..     Based on the above findings. I certify that this patient is confined to the home and needs intermittent skilled nursing care, physical therapy and/or speech therapy.  The patient is under my care, and I have initiated the establishment of the plan of care.  This patient will be followed by a physician who will periodically review the plan of care.  Physician/Provider to provide follow up care: Bernardino John     Responsible Medicare certified [place] Physician: josue  Physician Signature: See electronic signature associated with these discharge orders.  Date: 3/28/2019          ______________________________________      Patient was asked about 14 Review of systems including changes in vision (dry eyes, double vision), hearing, heart, lungs, musculoskeletal, depression, anxiety, snoring, RBD, insomnia, urinary frequency, urinary urgency, constipation, swallowing problems, hematological, ID, allergies, skin problems: seborrhea, endocrinological: thyroid, diabetes, cholesterol; balance, weight changes, and other neurological problems and these were not significant at this time except for   Patient Active Problem List   Diagnosis      Paralysis agitans (H)     Wears glasses     Balance problems     Constipation     GERD (gastroesophageal reflux disease)     Urinary urgency     Insomnia     Seborrhea     Lumbago     Hearing loss     Family history of Parkinson's disease     Central retinal vein occlusion     Choroidal nevus     Vision problem -- Right Eye     ED (erectile dysfunction)     Other specified glaucoma     Abnormal electrocardiogram     Bifascicular block     Prolonged Q-T interval on ECG     History of EMG     Encounter for examination for driving license     Parkinson disease (H)     Elevated prostate specific antigen (PSA)     Family history of epilepsy and other diseases of the nervous system     Gastroesophageal reflux disease     Impotence of organic origin     Other abnormalities of gait and mobility     Personal history of other medical treatment (CODE)     Benign neoplasm of choroid     Weakness          Allergies   Allergen Reactions     Cats      Dogs      Past Surgical History:   Procedure Laterality Date     NO HISTORY OF SURGERY       Past Medical History:   Diagnosis Date     Abnormal electrocardiogram 3/23/2017     Balance problems      Bifascicular block 3/23/2017     Central retinal vein occlusion 4/11/2014     Choroidal nevus 4/11/2014     Constipation      Elevated prostate specific antigen (PSA) 2/20/2018     Family history of Parkinson's disease      GERD (gastroesophageal reflux disease)      Glaucoma 6/4/2015     Hearing loss      History of EMG 2/20/2018    [facility]  Nerve Conduction & EMG Report  Patient:       Eamon Whalen Patient ID:    [id number] Gender:        Male YOB: 1955 Age:           62 Years 2 Months    History & Examination:  62 year old man with history of Parkinson's disease, restless leg syndrome, and paresthesias in feet and legs. Query polyneuropathy. His clinical examination      Lumbago      Parkinson's disease (H)      Prolonged Q-T  interval on ECG 3/23/2017     Urinary urgency      Wears glasses      Social History     Socioeconomic History     Marital status: Single     Spouse name: Not on file     Number of children: Not on file     Years of education: Not on file     Highest education level: Not on file   Occupational History     Occupation: farm     Employer: SELF EMPLOYED.   Social Needs     Financial resource strain: Not on file     Food insecurity:     Worry: Not on file     Inability: Not on file     Transportation needs:     Medical: Not on file     Non-medical: Not on file   Tobacco Use     Smoking status: Never Smoker     Smokeless tobacco: Never Used     Tobacco comment: cigar sometimes   Substance and Sexual Activity     Alcohol use: Yes     Comment: occ     Drug use: No     Sexual activity: Yes     Partners: Female   Lifestyle     Physical activity:     Days per week: Not on file     Minutes per session: Not on file     Stress: Not on file   Relationships     Social connections:     Talks on phone: Not on file     Gets together: Not on file     Attends Quaker service: Not on file     Active member of club or organization: Not on file     Attends meetings of clubs or organizations: Not on file     Relationship status: Not on file     Intimate partner violence:     Fear of current or ex partner: Not on file     Emotionally abused: Not on file     Physically abused: Not on file     Forced sexual activity: Not on file   Other Topics Concern     Parent/sibling w/ CABG, MI or angioplasty before 65F 55M? Not Asked   Social History Narrative    1 daughter who is  and lives in [place]    She was pregnant with the 1st child and had some problems in the 6-7 month of pregnancy with     Baby that may have hydrocephalus as there was IUGR. The baby  after 3 months in 2011. She was encouraged her to undergo genetic testing which was negative and they are considering having more children.     He met a woman and may  a  "woman from [place] - who has [place] roots. 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 He met her 3 times. Her name is Tavia and lives in [facility]. They will be visiting within a day or so with her mother. She is 36 yrs old. She is not going back there.     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 Depending on the status of the main house may build new house.         [place] in his home town had parkinson    [place] lived [place] the [place] side [place] town        [facility] [place] of [place] miles.         This was way before pesticides        Maternal grandfather     Dax Sorenson - Micronesian name     He would have to be brought out to a rocking chair    \"hardening of there arteries.         mother       Drug and lactation database from the [place] National Library of Medicine:  http://toxnet.nlm.nih.gov/cgi-bin/sis/htmlgen?LACT      B/P: Data Unavailable, T: Data Unavailable, P: Data Unavailable, R: Data Unavailable 0 lbs 0 oz  There were no vitals taken for this visit., There is no height or weight on file to calculate BMI.  Medications and Vitals not listed above were documented in the cart and reviewed by me.     Current Outpatient Medications   Medication Sig Dispense Refill     aspirin (ASA) 325 MG tablet Take 1 tablet by mouth       carbidopa-levodopa ER (RYTARY) 36. MG CPCR per CR capsule Take 3 capsules by mouth       ibuprofen (ADVIL/MOTRIN) 600 MG tablet Take 1 tablet by mouth       latanoprost (XALATAN) 0.005 % ophthalmic solution Apply 1 drop to eye At Bedtime       terbinafine (LAMISIL) 250 MG tablet Take 1 tablet by mouth       aspirin 81 MG EC tablet Take 81 mg by mouth daily       carbidopa-levodopa ER (RYTARY) 61. MG CPCR per CR capsule Take 3 capsules by mouth 3 times daily 270 capsule 11     Cholecalciferol (VITAMIN D3) 50 MCG (2000 UT) CAPS Take 1 capsule by " mouth daily  3     finasteride (PROSCAR) 5 MG tablet 5mg tab by mouth daily @ 9am 30 tablet 3     Fluocinolone Acetonide Scalp 0.01 % OIL oil Wet hair and apply to scalp and leave in for 4 hours and then wash off. May use 1 times per week (Patient not taking: Reported on 3/28/2019) 1 Bottle 11     ketoconazole (NIZORAL) 2 % cream Apply topically daily Apply topical daily as needed 60 g 11     ketoconazole (NIZORAL) 2 % shampoo Apply to the affected area and wash off after 5 minutes. 120 mL 11     Lactobacillus (ACIDOPHILUS PROBIOTIC) 0.5 MG TABS Take 1 capsule by mouth daily   3     latanoprost (XALATAN) 0.005 % ophthalmic solution One drop in both eyes @ 9pm 2.5 mL 1     QUEtiapine (SEROQUEL) 25 MG tablet Take one-half tablet by mouth nightly. May increase to one tablet nightly after 1-2 weeks if not effective. 30 tablet 11     QUEtiapine (SEROQUEL) 25 MG tablet 25MG BY MOUTH NIGHTLY @ 9PM. 90 tablet 5     rOPINIRole (REQUIP ER) 2 MG 24 hr tablet TAKE 1 TABLET BY MOUTH EVERY NIGHT AT 9PM. 30 tablet 5     RYTARY 48. MG CPCR per CR capsule   11     tadalafil (CIALIS) 20 MG tablet TAKE 1 TABLET BY MOUTH 1 HOUR BEFORE ACTIVITY AS NEEDED.  12     tamsulosin (FLOMAX) 0.4 MG capsule 0.4mg tab by mouth daily @ 9am 90 capsule 3     timolol (TIMOPTIC) 0.5 % ophthalmic solution 1 drop both eyes daily @ 9am 1 Bottle 11     VENTOLIN  (90 Base) MCG/ACT inhaler INHALE 1-2 PUFF BY INHALATION ROUTE EVERY 4-6 HOURS AS NEEDED AND AS DIRECTED.  1         Edison Villasenor MD

## 2020-01-30 ENCOUNTER — OFFICE VISIT (OUTPATIENT)
Dept: NEUROLOGY | Facility: CLINIC | Age: 65
End: 2020-01-30
Payer: COMMERCIAL

## 2020-01-30 VITALS
DIASTOLIC BLOOD PRESSURE: 84 MMHG | OXYGEN SATURATION: 98 % | SYSTOLIC BLOOD PRESSURE: 148 MMHG | HEART RATE: 77 BPM | BODY MASS INDEX: 20.95 KG/M2 | WEIGHT: 141.9 LBS

## 2020-01-30 DIAGNOSIS — G20.A1 PARKINSON DISEASE (H): Primary | ICD-10-CM

## 2020-01-30 DIAGNOSIS — N31.9 NEUROGENIC BLADDER: ICD-10-CM

## 2020-01-30 RX ORDER — MIRABEGRON 25 MG/1
25 TABLET, FILM COATED, EXTENDED RELEASE ORAL DAILY
Qty: 30 TABLET | Refills: 11 | Status: SHIPPED | OUTPATIENT
Start: 2020-01-30 | End: 2020-01-30

## 2020-01-30 ASSESSMENT — PAIN SCALES - GENERAL: PAINLEVEL: NO PAIN (0)

## 2020-01-30 NOTE — LETTER
2020       RE: Eamon Whalen  [address]  [facility] [address]     Dear Colleague,    Thank you for referring your patient, Eamon Whalen, to the [facility] NEUROLOGY at [facility]. Please see a copy of my visit note below.    Diagnosis/Summary/Recommendations:    PATIENT: Eamon Whalen  64 year old male     : 1955    MERCEDES: 2020    [employer]  Dr John's assistant was helping with this but the [employer] business was still confused.     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   Lives in [facility]     This past while he has had problems with urination that is affecting his sleep    Not sure about his prostate/bladder medications and if he is taking both the finasteride/proscar and tamsulosin/flomax.     Not sure if has been on mirabegron    He reportedly is going to see a urologist in [place] but he is unable to recall his        Medications     9am 3pm 9pm         Aspirin 81mg 1               Carbidopa-levodopa ER rytary 245mg 3 3 3         Cholecalciferol vitamin d3 50 mcg (2000 units)        Finasteride proscar 5mg 1           Fluocinolone acetonide derma-smoothe/FS Body 0.01% oil   has not tried               Ibuprofen advil/motrin 600mg Not using       Ketoconazole nizoral 2% cream  not sure if using                Ketoconazole nizoral 2% shampoo   not sure is unit(s) sing               Lactobacillus acidophilus probiotic             Latanoprost xalatan 0.005% opthalmic solution       Both eyes         Mometasone elocon 0.1% cream NOT SURE IF USING            Quetiapine seroquel 25mg     1 at times       Ropinirole requip XL 2mg TB24       1         Tadalafil cialis 20mg  Not yet       Tamsulosin flomax 0.4mg 1               Terbinafine lamisil 250mg stopped       Terbinafine lamisil 1% cream Not using           Timolol timoptic 0.5% ophthalmic solution Both eyes                Ventolin  (90 base) mcg/act inhaler                                                        History obtained from patient    Blood pressure is still slightly elevated with recheck today - 148/84  He may want to see Dr. John to see if the blood pressure remains high - as this may impact whether it is worth trying mirabegron (myrbetriq) for his bladder - This medication usually is started at 25mg dose per day    Continue the ropinirole XL at 2mg for now    He should take the quetiapine 25mg at night and will need to continue to monitor for hallucinations as we may want to increase this at some point. He is presently taking it variably.    Right now not clear what if any prostate/bladder medications he is taking and so Sheree Neumann will reach out to the team that is setting up his pills to see if he is taking tamsulosin/flomax and/or finasteride/proscar.     He tentatively has a urology appointment in [place] but not clear with whom and when.     Return back in 3 months with me or Roshni Gonzales along with Sheree Neumann, Pharm D - as his insurance only allows pharmacist involvement face-to-face. He will need her involvement in addressing his nocturia if the urologist is not able to sort this out. The bladder issue is the significant factor affecting his sleep presently.     ecg today showed RBBB and QTc of 472      Coding statement:   Duration of  Services: patient care and care coordination was 25 minutes  Greater than 50% of this visit was spent in counseling and coordination of care.     Edison Villasenor MD     ______________________________________    Last visit date and details:      : 1955     MERCEDES:      2019     Medications     9am 3pm 9pm         Aspirin 325mg (may go back to 81mg) 1               Carbidopa-levodopa ER rytary 195mg 3 3 3         Finasteride proscar 5mg 1           Fluocinolone acetonide derma-smoothe/FS Body 0.01% oil   has not tried               Ketoconazole nizoral 2% cream   not sure if using                Ketoconazole nizoral 2% shampoo                  Lactobacillus acidophilus probiotic             Latanoprost xalatan 0.005% opthalmic solution       Both eyes         Mometasone elocon 0.1% cream NOT SURE IF USING            Quetiapine seroquel 25mg     1 (not taking?)       Ropinirole requip XL 2mg TB24       1         Tamsulosin flomax 0.4mg 1               Terbinafine lamisil 1% cream Not using           Timolol timoptic 0.5% ophthalmic solution Both eyes                                                                        History obtained from patient          Documentation of Face-to-Face and Certification for Home Health Services               Patient: Eamon Whalen   YOB: 1955  MR Number: [id number]  Today's Date: 3/28/2019     I certify that patient: Eamon Whalen is under my care and that I, or a nurse practitioner or physician's assistant working with me, had a face-to-face encounter that meets the physician face-to-face encounter requirements with this patient on: 3/28/2019.     This encounter with the patient was in whole, or in part, for the following medical condition, which is the primary reason for home health care: parkinson.     I certify that, based on my findings, the following services are medically necessary home health services: Nursing, Occupational Therapy, Physical Therapy, Speech Language Therapy and social work.     My clinical findings support the need for the above services because: Nurse is needed: To provide assessment and oversight required in the home to assure adherence to the medical plan due to: parkinson.., Occupational Therapy Services are needed to assess and treat cognitive ability and address ADL safety due to impairment in cognition., Physical Therapy Services are needed to assess and treat the following functional impairments: balance/movement., Speech Therapy Services are needed to assess and treat impairments in language  and/or swallow functions due to swallowing and speech. and other issues. Also needs to see  to help address living issues related to impairments related to cognitive and motor changes.     Further, I certify that my clinical findings support that this patient is homebound (i.e. absences from home require considerable and taxing effort and are for medical reasons or Taoism services or infrequently or of short duration when for other reasons) because: Requires assistance of another person or specialized equipment to access medical services because patient:  of dementia..     Based on the above findings. I certify that this patient is confined to the home and needs intermittent skilled nursing care, physical therapy and/or speech therapy.  The patient is under my care, and I have initiated the establishment of the plan of care.  This patient will be followed by a physician who will periodically review the plan of care.  Physician/Provider to provide follow up care: Bernardino John     Responsible Medicare certified CHRISTIAN Physician: josue  Physician Signature: See electronic signature associated with these discharge orders.  Date: 3/28/2019          ______________________________________      Patient was asked about 14 Review of systems including changes in vision (dry eyes, double vision), hearing, heart, lungs, musculoskeletal, depression, anxiety, snoring, RBD, insomnia, urinary frequency, urinary urgency, constipation, swallowing problems, hematological, ID, allergies, skin problems: seborrhea, endocrinological: thyroid, diabetes, cholesterol; balance, weight changes, and other neurological problems and these were not significant at this time except for   Patient Active Problem List   Diagnosis     Paralysis agitans (H)     Wears glasses     Balance problems     Constipation     GERD (gastroesophageal reflux disease)     Urinary urgency     Insomnia     Seborrhea     Lumbago     Hearing loss      Family history of Parkinson's disease     Central retinal vein occlusion     Choroidal nevus     Vision problem -- Right Eye     ED (erectile dysfunction)     Other specified glaucoma     Abnormal electrocardiogram     Bifascicular block     Prolonged Q-T interval on ECG     History of EMG     Encounter for examination for driving license     Parkinson disease (H)     Elevated prostate specific antigen (PSA)     Family history of epilepsy and other diseases of the nervous system     Gastroesophageal reflux disease     Impotence of organic origin     Other abnormalities of gait and mobility     Personal history of other medical treatment (CODE)     Benign neoplasm of choroid     Weakness          Allergies   Allergen Reactions     Cats      Dogs      Past Surgical History:   Procedure Laterality Date     NO HISTORY OF SURGERY       Past Medical History:   Diagnosis Date     Abnormal electrocardiogram 3/23/2017     Balance problems      Bifascicular block 3/23/2017     Central retinal vein occlusion 4/11/2014     Choroidal nevus 4/11/2014     Constipation      Elevated prostate specific antigen (PSA) 2/20/2018     Family history of Parkinson's disease      GERD (gastroesophageal reflux disease)      Glaucoma 6/4/2015     Hearing loss      History of EMG 2/20/2018    [facility]  Nerve Conduction & EMG Report  Patient:       Eamon Whalen Patient ID:    [id number] Gender:        Male YOB: 1955 Age:           62 Years 2 Months    History & Examination:  62 year old man with history of Parkinson's disease, restless leg syndrome, and paresthesias in feet and legs. Query polyneuropathy. His clinical examination      Lumbago      Parkinson's disease (H)      Prolonged Q-T interval on ECG 3/23/2017     Urinary urgency      Wears glasses      Social History     Socioeconomic History     Marital status: Single     Spouse name: Not on file     Number of children: Not on file      Years of education: Not on file     Highest education level: Not on file   Occupational History     Occupation: farm     Employer: SELF EMPLOYED.   Social Needs     Financial resource strain: Not on file     Food insecurity:     Worry: Not on file     Inability: Not on file     Transportation needs:     Medical: Not on file     Non-medical: Not on file   Tobacco Use     Smoking status: Never Smoker     Smokeless tobacco: Never Used     Tobacco comment: cigar sometimes   Substance and Sexual Activity     Alcohol use: Yes     Comment: occ     Drug use: No     Sexual activity: Yes     Partners: Female   Lifestyle     Physical activity:     Days per week: Not on file     Minutes per session: Not on file     Stress: Not on file   Relationships     Social connections:     Talks on phone: Not on file     Gets together: Not on file     Attends Episcopalian service: Not on file     Active member of club or organization: Not on file     Attends meetings of clubs or organizations: Not on file     Relationship status: Not on file     Intimate partner violence:     Fear of current or ex partner: Not on file     Emotionally abused: Not on file     Physically abused: Not on file     Forced sexual activity: Not on file   Other Topics Concern     Parent/sibling w/ CABG, MI or angioplasty before 65F 55M? Not Asked   Social History Narrative    1 daughter who is  and lives in [place]    She was pregnant with the 1st child and had some problems in the 6-7 month of pregnancy with     Baby that may have hydrocephalus as there was IUGR. The baby  after 3 months in 2011. She was encouraged her to undergo genetic testing which was negative and they are considering having more children.     He met a woman and may  a woman from [place] - who has Turkish roots. 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 He met her 3 times. Her name is Tavia and lives in [facility]. They will be visiting within a day or so with her  "mother. She is 36 yrs old. She is not going back there.     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 Depending on the status of the main house may build new house.         [place] in his home town had parkinson    Germ[place] lived on the [place] side of town        [facility] -- [place] of [place] 12 miles.         This was way before pesticides        Maternal grandfather     Dax Sorenson - English name     He would have to be brought out to a rocking chair    \"hardening of there arteries.         mother       Drug and lactation database from the [place] National Library of Medicine:  http://toxnet.nlm.nih.gov/cgi-bin/sis/htmlgen?LACT      B/P: Data Unavailable, T: Data Unavailable, P: Data Unavailable, R: Data Unavailable 0 lbs 0 oz  There were no vitals taken for this visit., There is no height or weight on file to calculate BMI.  Medications and Vitals not listed above were documented in the cart and reviewed by me.     Current Outpatient Medications   Medication Sig Dispense Refill     aspirin (ASA) 325 MG tablet Take 1 tablet by mouth       carbidopa-levodopa ER (RYTARY) 36. MG CPCR per CR capsule Take 3 capsules by mouth       ibuprofen (ADVIL/MOTRIN) 600 MG tablet Take 1 tablet by mouth       latanoprost (XALATAN) 0.005 % ophthalmic solution Apply 1 drop to eye At Bedtime       terbinafine (LAMISIL) 250 MG tablet Take 1 tablet by mouth       aspirin 81 MG EC tablet Take 81 mg by mouth daily       carbidopa-levodopa ER (RYTARY) 61. MG CPCR per CR capsule Take 3 capsules by mouth 3 times daily 270 capsule 11     Cholecalciferol (VITAMIN D3) 50 MCG (2000 UT) CAPS Take 1 capsule by mouth daily  3     finasteride (PROSCAR) 5 MG tablet 5mg tab by mouth daily @ 9am 30 tablet 3     Fluocinolone Acetonide Scalp 0.01 % OIL oil Wet hair and apply to scalp and leave in for 4 hours and then " wash off. May use 1 times per week (Patient not taking: Reported on 3/28/2019) 1 Bottle 11     ketoconazole (NIZORAL) 2 % cream Apply topically daily Apply topical daily as needed 60 g 11     ketoconazole (NIZORAL) 2 % shampoo Apply to the affected area and wash off after 5 minutes. 120 mL 11     Lactobacillus (ACIDOPHILUS PROBIOTIC) 0.5 MG TABS Take 1 capsule by mouth daily   3     latanoprost (XALATAN) 0.005 % ophthalmic solution One drop in both eyes @ 9pm 2.5 mL 1     QUEtiapine (SEROQUEL) 25 MG tablet Take one-half tablet by mouth nightly. May increase to one tablet nightly after 1-2 weeks if not effective. 30 tablet 11     QUEtiapine (SEROQUEL) 25 MG tablet 25MG BY MOUTH NIGHTLY @ 9PM. 90 tablet 5     rOPINIRole (REQUIP ER) 2 MG 24 hr tablet TAKE 1 TABLET BY MOUTH EVERY NIGHT AT 9PM. 30 tablet 5     RYTARY 48. MG CPCR per CR capsule   11     tadalafil (CIALIS) 20 MG tablet TAKE 1 TABLET BY MOUTH 1 HOUR BEFORE ACTIVITY AS NEEDED.  12     tamsulosin (FLOMAX) 0.4 MG capsule 0.4mg tab by mouth daily @ 9am 90 capsule 3     timolol (TIMOPTIC) 0.5 % ophthalmic solution 1 drop both eyes daily @ 9am 1 Bottle 11     VENTOLIN  (90 Base) MCG/ACT inhaler INHALE 1-2 PUFF BY INHALATION ROUTE EVERY 4-6 HOURS AS NEEDED AND AS DIRECTED.  1         Edison Villasenor MD    Again, thank you for allowing me to participate in the care of your patient.      Sincerely,    Edison Villasenor MD

## 2020-01-30 NOTE — NURSING NOTE
Chief Complaint   Patient presents with     Parkinson     [employer] RETURN MOVEMENT DISORDER      Lyndsay العلي, EMT

## 2020-01-30 NOTE — PATIENT INSTRUCTIONS
Blood pressure is still slightly elevated with recheck today - 148/84  He may want to see Dr. John to see if the blood pressure remains high - as this may impact whether it is worth trying mirabegron (myrbetriq) for his bladder - This medication usually is started at 25mg dose per day    Continue the ropinirole XL at 2mg for now    He should take the quetiapine 25mg at night and will need to continue to monitor for hallucinations as we may want to increase this at some point. He is presently taking it variably.    Right now not clear what if any prostate/bladder medications he is taking and so Sheree Neumann will reach out to the team that is setting up his pills to see if he is taking tamsulosin/flomax and/or finasteride/proscar.     He tentatively has a urology appointment in [place] but not clear with whom and when.     Return back in 3 months with me or Roshni Gonzales along with Sheree Neumann, Pharm D - as his insurance only allows pharmacist involvement face-to-face. He will need her involvement in addressing his nocturia if the urologist is not able to sort this out. The bladder issue is the significant factor affecting his sleep presently.

## 2020-01-30 NOTE — LETTER
2020    RE: Eamon Whalen  [address]  [facility] [address]     Diagnosis/Summary/Recommendations:    PATIENT: Eamon Whalen  64 year old male     : 1955    MERCEDES: 2020    Pietro cab  Dr John's assistant was helping with this but the [employer] business was still confused.     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   Lives in [facility]     This past while he has had problems with urination that is affecting his sleep    Not sure about his prostate/bladder medications and if he is taking both the finasteride/proscar and tamsulosin/flomax.     Not sure if has been on mirabegron    He reportedly is going to see a urologist in [place] but he is unable to recall his        Medications     9am 3pm 9pm         Aspirin 81mg 1               Carbidopa-levodopa ER rytary 245mg 3 3 3         Cholecalciferol vitamin d3 50 mcg (2000 units)        Finasteride proscar 5mg 1           Fluocinolone acetonide derma-smoothe/FS Body 0.01% oil   has not tried               Ibuprofen advil/motrin 600mg Not using       Ketoconazole nizoral 2% cream  not sure if using                Ketoconazole nizoral 2% shampoo   not sure is unit(s) sing               Lactobacillus acidophilus probiotic             Latanoprost xalatan 0.005% opthalmic solution       Both eyes         Mometasone elocon 0.1% cream NOT SURE IF USING            Quetiapine seroquel 25mg     1 at times       Ropinirole requip XL 2mg TB24       1         Tadalafil cialis 20mg  Not yet       Tamsulosin flomax 0.4mg 1               Terbinafine lamisil 250mg stopped       Terbinafine lamisil 1% cream Not using           Timolol timoptic 0.5% ophthalmic solution Both eyes               Ventolin  (90 base) mcg/act inhaler                                                        History obtained from patient    Blood pressure is still slightly elevated with recheck today -  "148/84  He may want to see Dr. John to see if the blood pressure remains high - as this may impact whether it is worth trying mirabegron (myrbetriq) for his bladder - This medication usually is started at 25mg dose per day    Continue the ropinirole XL at 2mg for now    He should take the quetiapine 25mg at night and will need to continue to monitor for hallucinations as we may want to increase this at some point. He is presently taking it variably.    Right now not clear what if any prostate/bladder medications he is taking and so Sheree Neumann will reach out to the team that is setting up his pills to see if he is taking tamsulosin/flomax and/or finasteride/proscar.     He tentatively has a urology appointment in [place] but not clear with whom and when.     Return back in 3 months with me or Roshni Gonzales along with Sheree Neumann, Pharm D - as his insurance only allows pharmacist involvement face-to-face. He will need her involvement in addressing his nocturia if the urologist is not able to sort this out. The bladder issue is the significant factor affecting his sleep presently.     ecg today showed RBBB and QTc of 472    Addendum from Sheree Neumann:    \"Spoke with his home care nurse and updated the med list. He is taking Proscar and Flomax. The most recent BP's she had taken were pretty good so I think we could start Myrbetriq. I verified there isn't a QT prolongation risk. Do you want to prescribe Myrbetriq or do you want him to go to urology?     If prescribing Myrbetriq, it should be sent to [facility] pharmacy and we should have Jeaneth or Jolene fax the order to [facility] at [phone].\"      Coding statement:   Duration of  Services: patient care and care coordination was 25 minutes  Greater than 50% of this visit was spent in counseling and coordination of care.     Edison Villasenor MD     ______________________________________    Last visit date and details:      : 1955     MERCEDES:      " 2019     Medications     9am 3pm 9pm         Aspirin 325mg (may go back to 81mg) 1               Carbidopa-levodopa ER rytary 195mg 3 3 3         Finasteride proscar 5mg 1           Fluocinolone acetonide derma-smoothe/FS Body 0.01% oil   has not tried               Ketoconazole nizoral 2% cream  not sure if using                Ketoconazole nizoral 2% shampoo                  Lactobacillus acidophilus probiotic             Latanoprost xalatan 0.005% opthalmic solution       Both eyes         Mometasone elocon 0.1% cream NOT SURE IF USING            Quetiapine seroquel 25mg     1 (not taking?)       Ropinirole requip XL 2mg TB24       1         Tamsulosin flomax 0.4mg 1               Terbinafine lamisil 1% cream Not using           Timolol timoptic 0.5% ophthalmic solution Both eyes                                                                        History obtained from patient          Documentation of Face-to-Face and Certification for Home Health Services               Patient: Eamon Whalen   YOB: 1955  MR Number: [id number]  Today's Date: 3/28/2019     I certify that patient: Eamon Whalen is under my care and that I, or a nurse practitioner or physician's assistant working with me, had a face-to-face encounter that meets the physician face-to-face encounter requirements with this patient on: 3/28/2019.     This encounter with the patient was in whole, or in part, for the following medical condition, which is the primary reason for home health care: parkinson.     I certify that, based on my findings, the following services are medically necessary home health services: Nursing, Occupational Therapy, Physical Therapy, Speech Language Therapy and social work.     My clinical findings support the need for the above services because: Nurse is needed: To provide assessment and oversight required in the home to assure adherence to the medical plan due to: parkinson.., Occupational Therapy Services  are needed to assess and treat cognitive ability and address ADL safety due to impairment in cognition., Physical Therapy Services are needed to assess and treat the following functional impairments: balance/movement., Speech Therapy Services are needed to assess and treat impairments in language and/or swallow functions due to swallowing and speech. and other issues. Also needs to see  to help address living issues related to impairments related to cognitive and motor changes.     Further, I certify that my clinical findings support that this patient is homebound (i.e. absences from home require considerable and taxing effort and are for medical reasons or [employer] services or infrequently or of short duration when for other reasons) because: Requires assistance of another person or specialized equipment to access medical services because patient:  of dementia..     Based on the above findings. I certify that this patient is confined to the home and needs intermittent skilled nursing care, physical therapy and/or speech therapy.  The patient is under my care, and I have initiated the establishment of the plan of care.  This patient will be followed by a physician who will periodically review the plan of care.  Physician/Provider to provide follow up care: Bernardino John     Responsible Medicare certified [place] Physician: josue  Physician Signature: See electronic signature associated with these discharge orders.  Date: 3/28/2019          ______________________________________      Patient was asked about 14 Review of systems including changes in vision (dry eyes, double vision), hearing, heart, lungs, musculoskeletal, depression, anxiety, snoring, RBD, insomnia, urinary frequency, urinary urgency, constipation, swallowing problems, hematological, ID, allergies, skin problems: seborrhea, endocrinological: thyroid, diabetes, cholesterol; balance, weight changes, and other neurological problems and  these were not significant at this time except for   Patient Active Problem List   Diagnosis     Paralysis agitans (H)     Wears glasses     Balance problems     Constipation     GERD (gastroesophageal reflux disease)     Urinary urgency     Insomnia     Seborrhea     Lumbago     Hearing loss     Family history of Parkinson's disease     Central retinal vein occlusion     Choroidal nevus     Vision problem -- Right Eye     ED (erectile dysfunction)     Other specified glaucoma     Abnormal electrocardiogram     Bifascicular block     Prolonged Q-T interval on ECG     History of EMG     Encounter for examination for driving license     Parkinson disease (H)     Elevated prostate specific antigen (PSA)     Family history of epilepsy and other diseases of the nervous system     Gastroesophageal reflux disease     Impotence of organic origin     Other abnormalities of gait and mobility     Personal history of other medical treatment (CODE)     Benign neoplasm of choroid     Weakness          Allergies   Allergen Reactions     Cats      Dogs      Past Surgical History:   Procedure Laterality Date     NO HISTORY OF SURGERY       Past Medical History:   Diagnosis Date     Abnormal electrocardiogram 3/23/2017     Balance problems      Bifascicular block 3/23/2017     Central retinal vein occlusion 4/11/2014     Choroidal nevus 4/11/2014     Constipation      Elevated prostate specific antigen (PSA) 2/20/2018     Family history of Parkinson's disease      GERD (gastroesophageal reflux disease)      Glaucoma 6/4/2015     Hearing loss      History of EMG 2/20/2018    [facility]  Nerve Conduction & EMG Report  Patient:       Eamon Whalen Patient ID:    [id number] Gender:        Male YOB: 1955 Age:           62 Years 2 Months    History & Examination:  62 year old man with history of Parkinson's disease, restless leg syndrome, and paresthesias in feet and legs. Query  polyneuropathy. His clinical examination      Lumbago      Parkinson's disease (H)      Prolonged Q-T interval on ECG 3/23/2017     Urinary urgency      Wears glasses      Social History     Socioeconomic History     Marital status: Single     Spouse name: Not on file     Number of children: Not on file     Years of education: Not on file     Highest education level: Not on file   Occupational History     Occupation: farm     Employer: SELF EMPLOYED.   Social Needs     Financial resource strain: Not on file     Food insecurity:     Worry: Not on file     Inability: Not on file     Transportation needs:     Medical: Not on file     Non-medical: Not on file   Tobacco Use     Smoking status: Never Smoker     Smokeless tobacco: Never Used     Tobacco comment: cigar sometimes   Substance and Sexual Activity     Alcohol use: Yes     Comment: occ     Drug use: No     Sexual activity: Yes     Partners: Female   Lifestyle     Physical activity:     Days per week: Not on file     Minutes per session: Not on file     Stress: Not on file   Relationships     Social connections:     Talks on phone: Not on file     Gets together: Not on file     Attends Baptism service: Not on file     Active member of club or organization: Not on file     Attends meetings of clubs or organizations: Not on file     Relationship status: Not on file     Intimate partner violence:     Fear of current or ex partner: Not on file     Emotionally abused: Not on file     Physically abused: Not on file     Forced sexual activity: Not on file   Other Topics Concern     Parent/sibling w/ CABG, MI or angioplasty before 65F 55M? Not Asked   Social History Narrative    1 daughter who is  and lives in [place]    She was pregnant with the 1st child and had some problems in the 6-7 month of pregnancy with     Baby that may have hydrocephalus as there was IUGR. The baby  after 3 months in 2011. She was encouraged her to undergo genetic testing  "which was negative and they are considering having more children.     He met a woman and may  a woman from [place] - who has [place] roots. 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 He met her 3 times. Her name is Tavia and lives in [facility]. They will be visiting within a day or so with her mother. She is 36 yrs old. She is not going back there.     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 Depending on the status of the main house may build new house.         [place] in his home town had parkinson    Germans lived on the other side of town        [facility] -- [place] of [place] 12 miles.         This was way before pesticides        Maternal grandfather     Dax Sorenson - [place] name     He would have to be brought out to a rocking chair    \"hardening of there arteries.         mother       Drug and lactation database from the [place] National Library of Medicine:  http://toxnet.nlm.nih.gov/cgi-bin/sis/htmlgen?LACT      B/P: Data Unavailable, T: Data Unavailable, P: Data Unavailable, R: Data Unavailable 0 lbs 0 oz  There were no vitals taken for this visit., There is no height or weight on file to calculate BMI.  Medications and Vitals not listed above were documented in the cart and reviewed by me.     Current Outpatient Medications   Medication Sig Dispense Refill     aspirin (ASA) 325 MG tablet Take 1 tablet by mouth       carbidopa-levodopa ER (RYTARY) 36. MG CPCR per CR capsule Take 3 capsules by mouth       ibuprofen (ADVIL/MOTRIN) 600 MG tablet Take 1 tablet by mouth       latanoprost (XALATAN) 0.005 % ophthalmic solution Apply 1 drop to eye At Bedtime       terbinafine (LAMISIL) 250 MG tablet Take 1 tablet by mouth       aspirin 81 MG EC tablet Take 81 mg by mouth daily       carbidopa-levodopa ER (RYTARY) 61. MG CPCR per CR capsule Take 3 capsules by mouth 3 " times daily 270 capsule 11     Cholecalciferol (VITAMIN D3) 50 MCG (2000 UT) CAPS Take 1 capsule by mouth daily  3     finasteride (PROSCAR) 5 MG tablet 5mg tab by mouth daily @ 9am 30 tablet 3     Fluocinolone Acetonide Scalp 0.01 % OIL oil Wet hair and apply to scalp and leave in for 4 hours and then wash off. May use 1 times per week (Patient not taking: Reported on 3/28/2019) 1 Bottle 11     ketoconazole (NIZORAL) 2 % cream Apply topically daily Apply topical daily as needed 60 g 11     ketoconazole (NIZORAL) 2 % shampoo Apply to the affected area and wash off after 5 minutes. 120 mL 11     Lactobacillus (ACIDOPHILUS PROBIOTIC) 0.5 MG TABS Take 1 capsule by mouth daily   3     latanoprost (XALATAN) 0.005 % ophthalmic solution One drop in both eyes @ 9pm 2.5 mL 1     QUEtiapine (SEROQUEL) 25 MG tablet Take one-half tablet by mouth nightly. May increase to one tablet nightly after 1-2 weeks if not effective. 30 tablet 11     QUEtiapine (SEROQUEL) 25 MG tablet 25MG BY MOUTH NIGHTLY @ 9PM. 90 tablet 5     rOPINIRole (REQUIP ER) 2 MG 24 hr tablet TAKE 1 TABLET BY MOUTH EVERY NIGHT AT 9PM. 30 tablet 5     RYTARY 48. MG CPCR per CR capsule   11     tadalafil (CIALIS) 20 MG tablet TAKE 1 TABLET BY MOUTH 1 HOUR BEFORE ACTIVITY AS NEEDED.  12     tamsulosin (FLOMAX) 0.4 MG capsule 0.4mg tab by mouth daily @ 9am 90 capsule 3     timolol (TIMOPTIC) 0.5 % ophthalmic solution 1 drop both eyes daily @ 9am 1 Bottle 11     VENTOLIN  (90 Base) MCG/ACT inhaler INHALE 1-2 PUFF BY INHALATION ROUTE EVERY 4-6 HOURS AS NEEDED AND AS DIRECTED.  1         Edison Villasenor MD

## 2020-01-30 NOTE — LETTER
"[facility]  [facility] NEUROLOG[facility]  [address] [address]  [phone]            2020          Dear Karly Proxy Patient,    We received a request to activate you as a proxy for another patient of [facility] or [place].  In order to do so, we need to activate your [employer] account as well.    Your access code is: [unfilled]      Please access the [employer] website:  -  [employer] [email or web address]  -  [employer] [email or web address].    Below the ID and password fields, select the \"Sign Up Now\" as New User.  You will be prompted to enter the access code listed above as well as additional personal information.  Please follow the directions carefully when creating your username and password.    Once your account is activated, you can access the proxy accounts under \"Shared Medical Records\".    If you allow your access code to , or if you have any questions please call a [employer] Representative during normal clinic hours.     Sincerely,        [employer] Customer Service    "

## 2020-01-31 ENCOUNTER — TELEPHONE (OUTPATIENT)
Dept: PHARMACY | Facility: CLINIC | Age: 65
End: 2020-01-31

## 2020-01-31 RX ORDER — MIRABEGRON 25 MG/1
25 TABLET, FILM COATED, EXTENDED RELEASE ORAL DAILY
Qty: 30 TABLET | Refills: 11 | Status: SHIPPED | OUTPATIENT
Start: 2020-01-31 | End: 2020-10-27

## 2020-01-31 NOTE — TELEPHONE ENCOUNTER
Received voicemail from patient in follow up to appointment yesterday to provide us with the phone number of his home care nurse. Her name is FAYE Andrea and her phone number is [phone]. He gave me verbal authorization to talk with Zully.     I called [facility] and reviewed and updated the patient's medication list. Zully also provided me with the last two blood pressures: 135/96 and 129/89, which she states has been pretty typical for him the last month. If we do need to change orders we should [facility] at [phone].     Sheree Neumann, Pharm.D.  Medication Therapy Management Pharmacist  Phone: [phone]

## 2020-01-31 NOTE — TELEPHONE ENCOUNTER
Called patient to let him know that Dr. Villasenor prescribed the mirabgron. He appreciated the call.    I faxed the order to his primary care provider and to the home care company as well.

## 2020-02-19 ENCOUNTER — TELEPHONE (OUTPATIENT)
Dept: PHARMACY | Facility: CLINIC | Age: 65
End: 2020-02-19

## 2020-02-19 NOTE — TELEPHONE ENCOUNTER
Received voicemail from patient stating that he is having issues getting his medication from his pharmacy due to insurance coverage.     Per Epic review, Rytary PA is approved through 3/16/20 and ropinirole ER PA is approved through 11/1/20. We can initiate a PA for Rytary now as it appears it will be due next month for renewal.     Sheree Neumann, Pharm.D.  Medication Therapy Management Pharmacist  Phone: [phone]

## 2020-02-20 NOTE — TELEPHONE ENCOUNTER
Prior Authorization Retail Medication Request    Medication/Dose:   ICD code (if different than what is on RX):  G20  Previously Tried and Failed:  Ropinirole, carbidopa-levodopa CR, carbidopa-levodopa IR, trihexyphenidyl, pramipexole   Rationale:  Patient has been stable on Rytary with excellent response to the medication.    Insurance Name:  Blue Plus [employer]vantage  Insurance ID:  [id number]      Pharmacy Information (if different than what is on RX)  Name:  [facility] #[id number] [place] [address] ([facility], [address]  Phone:  [phone]

## 2020-02-20 NOTE — TELEPHONE ENCOUNTER
PA Initiation    Medication: carbidopa-levodopa ER (RYTARY) 61. MG CPCR per CR capsule - INITIATED  Insurance Company: [employer] [place] - Phone [phone] Fax [phone]  Pharmacy Filling the Rx: [facility] #[id number] - [facility], [place] - [address] [place] [address]  Filling Pharmacy Phone: [phone]  Filling Pharmacy Fax: [phone]  Start Date: 2/20/2020

## 2020-02-21 ENCOUNTER — TELEPHONE (OUTPATIENT)
Dept: NEUROLOGY | Facility: CLINIC | Age: 65
End: 2020-02-21

## 2020-02-21 DIAGNOSIS — G20.A1 PARALYSIS AGITANS (H): ICD-10-CM

## 2020-02-21 NOTE — TELEPHONE ENCOUNTER
PA Initiation    Medication: mirabegron (MYRBETRIQ) 25 MG 24 hr tablet - INITIATED  Insurance Company: [employer] [place] - Phone [phone] Fax [phone]  Pharmacy Filling the Rx: [facility] #[id number] - [place] ([facility], [place] - [address] [place] [address]  Filling Pharmacy Phone: [phone]  Filling Pharmacy Fax: [phone]  Start Date: 2/21/2020

## 2020-02-21 NOTE — TELEPHONE ENCOUNTER
Prior Authorization Not Needed per Insurance    Medication: carbidopa-levodopa ER (RYTARY) 61. MG CPCR per CR capsule - PA NOT NEEDED  Insurance Company: [employer] [place] - Phone [phone] Fax [phone]  Expected CoPay:      Pharmacy Filling the Rx: [facility] #[id number] - [place] [place] - [address] [facility]  Pharmacy Notified: Yes  Patient Notified: Yes    Spoke to Des at [facility] to relay info about PA not being needed.  Des states that RX they had has  for this medication, so they need a refill.     Their pharmacy NEEDS PA FOR MYBERTRIQ which we did not get a request for. Starting this today.    Previous PA for Rytary is still in effect with the plan at this time. We will have to wait until expires before beginning a new PA.    Marietta Longoria PA Team

## 2020-02-24 NOTE — TELEPHONE ENCOUNTER
PRIOR AUTHORIZATION DENIED    Medication: mirabegron (MYRBETRIQ) 25 MG 24 hr tablet - DENIED    Denial Date: 2/22/2020    Denial Rational: Patient must have a history of trial and failure or contraindication or intolerance to 2 formulary alternatives (SEE ALTERNATIVES BELOW)    Appeal Information: Eligible for appeal within 60 days of this notice and letter of medical necessity

## 2020-03-06 NOTE — TELEPHONE ENCOUNTER
Called [employer] directly and spoke with Rep Korina GARVIN Ref # 3/6/20 @11:57 AM CST to follow up on appeal request.    Korina says that she checked in with their clinical review team and was told they have not received my request for appeal. I asked that Korina verify fax number and she provided a [phone] which is what was different than the fax number listed on form.    Resending URGENT fax today. Will continue to wait for outcome.    Marietta Longoria PA Team

## 2020-03-09 NOTE — TELEPHONE ENCOUNTER
MEDICATION APPEAL APPROVED    Medication: mirabegron (MYRBETRIQ) 25 MG 24 hr tablet - APPEAL APPROVED  Authorization Effective Date: 1/1/2020  Authorization Expiration Date: 3/7/2021  Reference #: [id number]   Insurance Company: [employer] [place] - Phone [phone] Fax [phone]  Which Pharmacy is filling the prescription (Not needed for infusion/clinic administered): [facility] #[id number] [place] [address] ([facility], [place] - [address] [place] [address]  Swati PA Team

## 2020-03-31 ENCOUNTER — TELEPHONE (OUTPATIENT)
Dept: NEUROLOGY | Facility: CLINIC | Age: 65
End: 2020-03-31

## 2020-03-31 NOTE — TELEPHONE ENCOUNTER
Prior Authorization Retail Medication Request    Medication/Dose: Rytary 61.25-245mg ER   ICD code (if different than what is on RX):    Previously Tried and Failed:    Rationale:      Insurance Name:    Insurance ID:        Pharmacy Information (if different than what is on RX)  Name:    Phon

## 2020-04-01 NOTE — TELEPHONE ENCOUNTER
Rec'd request from insurance for additional information. I have faxed back patient's past/current medication list.

## 2020-04-01 NOTE — TELEPHONE ENCOUNTER
Central Prior Authorization Team   Phone: [phone]    PA Initiation    Medication: carbidopa-levodopa ER (Rytary)  61. MG PO CPCR   Insurance Company: [employer] - Phone [phone] Fax [phone]  Pharmacy Filling the Rx: [facility] #[id number] [facility], [place] - [address] [facility]  Filling Pharmacy Phone: [phone]  Filling Pharmacy Fax:    Start Date: 4/1/2020

## 2020-04-02 NOTE — TELEPHONE ENCOUNTER
Prior Authorization Approval    Authorization Effective Date: 1/1/2020  Authorization Expiration Date: 4/1/2021  Medication: carbidopa-levodopa ER (Rytary)  61. MG cap   Approved Dose/Quantity:   Reference #: [id number]   Insurance Company: [employer] - Phone [phone] Fax [phone]  Which Pharmacy is filling the prescription (Not needed for infusion/clinic administered): [facility] #[id number] [facility], [place] - [address] [place] [address]  Pharmacy Notified: Yes -via voicemail  Patient Notified:

## 2020-04-22 NOTE — PROGRESS NOTES
VIDEO VISIT - had to be switched to a phone visit    Date of Visit: April 30, 2020  Name: Eamon Whalen  Date of Birth 1955  [phone] (M)  [phone] (H)  [email or web address]  Has mychart  No clear daughter has set up mychart  Marlin Treviño daughter  [phone] mobile    Sent [employer] message    Assessment:  (G20) Parkinson disease (H)  (primary encounter diagnosis)  Comment:     Zully younger sister in [place] ?  [phone]        Medications     9am 3pm 9pm         Aspirin 81mg 1               Carbidopa-levodopa ER rytary 245mg 3 3 3         Cholecalciferol vitamin d3 50 mcg (2000 units)             Finasteride proscar 5mg 1           Fluocinolone acetonide derma-smoothe/FS Body 0.01% oil   has not tried               Ibuprofen advil/motrin 600mg Not using           Ketoconazole nizoral 2% cream  not sure if using                Ketoconazole nizoral 2% shampoo   not sure is unit(s) sing               Lactobacillus acidophilus probiotic             Latanoprost xalatan 0.005% opthalmic solution       Both eyes         Mirabegron myrbetriq 25mg 24 hr tablet           Quetiapine seroquel 25mg     1 at times       Ropinirole requip XL 2mg TB24       1         Tadalafil cialis 20mg  Not yet           Tamsulosin flomax 0.4mg 1               Timolol timoptic 0.5% ophthalmic solution Both eyes                                                                     Plan:    Due to confusion visit  Had to be cut short  Will need family and home care services to help patient.                   Documentation of a Face-to-Face Physician Encounter April 30, 2020    Eamon Whalen  1955  [id number]    I certify that this patient is under my care and that I, or a nurse practitioner or physician's assistant working with me, had a face-to-face encounter that meets the physician face-to-face encounter requirements with this patient on: 4/30/2020.    The encounter with the patient was in whole, or in part, for  the following medical condition, which is the primary reason for home health care:  Patient Active Problem List   Diagnosis     Paralysis agitans (H)     Wears glasses     Balance problems     Constipation     GERD (gastroesophageal reflux disease)     Urinary urgency     Insomnia     Seborrhea     Lumbago     Hearing loss     Family history of Parkinson's disease     Central retinal vein occlusion     Choroidal nevus     Vision problem -- Right Eye     ED (erectile dysfunction)     Other specified glaucoma     Abnormal electrocardiogram     Bifascicular block     Prolonged Q-T interval on ECG     History of EMG     Encounter for examination for driving license     Parkinson disease (H)     Elevated prostate specific antigen (PSA)     Family history of epilepsy and other diseases of the nervous system     Gastroesophageal reflux disease     Impotence of organic origin     Other abnormalities of gait and mobility     Personal history of other medical treatment (CODE)     Benign neoplasm of choroid     Weakness       I certify that, based on my findings, the following services are medically necessary home health services: Nursing, Occupational Therapy, Physical Therapy and Social Work    My clinical findings support the need for the above services because: parkinson/cognitive changes    Further, I certify that my clinical findings support that this patient is homebound (i.e. absences from home require considerable and taxing effort and are for medical reasons or [employer] services or infrequently or of short duration when for other reasons) because: parkinson    Physician signature ___________________________________   April 30, 2020  Physician name: Edison Villasenor MD    Fax ([phone]) or scan/email ([email or web address]) this completed document to [facility] within 24 hours of the referral date.  Questions: [phone].      I have reviewed the note as documented above.  This accurately captures the  "substance of my conversation with the patient.  Patient contact time 15  minutes. Over 50% of this visit was spent in patient care and care coordination.     230pm - 245pm    Edison Villasenor MD      ------------------------------------------------------------------------------------------------------------------------------------------------------------------------      Telephone-Visit Details    The patient has been notified of following:     \"After a review of the patient s situation, this visit was changed from an in-person visit to a telephone visit to reduce the risk of COVID-19 exposure.   The patient is being evaluated via a billable telephone visit.\"    \"This Telephone visit will be conducted via a call between you and your physician/provider. We have found that certain health care needs can be provided without the need for an in-person physical exam.  This service lets us provide the care you need with a telephone  conversation.  If a prescription is necessary we can send it directly to your pharmacy.  If lab work is needed we can place an order for that and you can then stop by our lab to have the test done at a later time.    If during the course of the call the physician/provider feels a telephone visit is not appropriate, you will not be charged for this service.\"     Patient has given verbal consent for Telephone visit? Yes    Type of service:  Telephone visit     Duration of Visit:     Originating Location (pt. Location): -    Distant Location (provider location):  [facility] NEUROLOGY     Mode of Communication:  Telephone      --------------------------------------------------------------------------------------------------------------    Eamon Whalen is a 64 year old male who is being evaluated via a billable video visit.      Charts reviewed  Consult from  Images reviewed        I have reviewed and updated the patient's Past Medical History, Social History, Family History and Medication " List.    ALLERGIES  Cats and Dogs    Lasts visit details if there was a last visit:     MERCEDES: January 30, 2020     [facility]  Dr John's assistant was helping with this but the [employer] business was still confused.      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   Lives in [facility]      This past while he has had problems with urination that is affecting his sleep     Not sure about his prostate/bladder medications and if he is taking both the finasteride/proscar and tamsulosin/flomax.      Not sure if has been on mirabegron     He reportedly is going to see a urologist in [place] but he is unable to recall his         Medications     9am 3pm 9pm         Aspirin 81mg 1               Carbidopa-levodopa ER rytary 245mg 3 3 3         Cholecalciferol vitamin d3 50 mcg (2000 units)             Finasteride proscar 5mg 1           Fluocinolone acetonide derma-smoothe/FS Body 0.01% oil   has not tried               Ibuprofen advil/motrin 600mg Not using           Ketoconazole nizoral 2% cream  not sure if using                Ketoconazole nizoral 2% shampoo   not sure is unit(s) sing               Lactobacillus acidophilus probiotic             Latanoprost xalatan 0.005% opthalmic solution       Both eyes         Mometasone elocon 0.1% cream NOT SURE IF USING            Quetiapine seroquel 25mg     1 at times       Ropinirole requip XL 2mg TB24       1         Tadalafil cialis 20mg  Not yet           Tamsulosin flomax 0.4mg 1               Terbinafine lamisil 250mg stopped           Terbinafine lamisil 1% cream Not using           Timolol timoptic 0.5% ophthalmic solution Both eyes               Ventolin  (90 base) mcg/act inhaler                                                        History obtained from patient     Blood pressure is still slightly elevated with recheck today - 148/84  He may want to see Dr. John to see if the blood pressure  "remains high - as this may impact whether it is worth trying mirabegron (myrbetriq) for his bladder - This medication usually is started at 25mg dose per day     Continue the ropinirole XL at 2mg for now     He should take the quetiapine 25mg at night and will need to continue to monitor for hallucinations as we may want to increase this at some point. He is presently taking it variably.     Right now not clear what if any prostate/bladder medications he is taking and so Sheree Neumann will reach out to the team that is setting up his pills to see if he is taking tamsulosin/flomax and/or finasteride/proscar.      He tentatively has a urology appointment in [place] but not clear with whom and when.      Return back in 3 months with me or Roshni Gonzales along with Sheree Neumann, Pharm D - as his insurance only allows pharmacist involvement face-to-face. He will need her involvement in addressing his nocturia if the urologist is not able to sort this out. The bladder issue is the significant factor affecting his sleep presently.      ecg today showed RBBB and QTc of 472     Addendum from Sheree Neumann:     \"Spoke with his home care nurse and updated the med list. He is taking Proscar and Flomax. The most recent BP's she had taken were pretty good so I think we could start Myrbetriq. I verified there isn't a QT prolongation risk. Do you want to prescribe Myrbetriq or do you want him to go to urology?     If prescribing Myrbetriq, it should be sent to [facility] pharmacy and we should have Jeaneth or Jolene fax the order to [facility] at [phone].\"      Medications                                                                                                                                                                                                              14 Review of systems  are negative except for   Patient Active Problem List   Diagnosis     Paralysis agitans (H)     Wears glasses     Balance problems     " Constipation     GERD (gastroesophageal reflux disease)     Urinary urgency     Insomnia     Seborrhea     Lumbago     Hearing loss     Family history of Parkinson's disease     Central retinal vein occlusion     Choroidal nevus     Vision problem -- Right Eye     ED (erectile dysfunction)     Other specified glaucoma     Abnormal electrocardiogram     Bifascicular block     Prolonged Q-T interval on ECG     History of EMG     Encounter for examination for driving license     Parkinson disease (H)     Elevated prostate specific antigen (PSA)     Family history of epilepsy and other diseases of the nervous system     Gastroesophageal reflux disease     Impotence of organic origin     Other abnormalities of gait and mobility     Personal history of other medical treatment (CODE)     Benign neoplasm of choroid     Weakness        Allergies   Allergen Reactions     Cats      Dogs      Past Surgical History:   Procedure Laterality Date     NO HISTORY OF SURGERY       Past Medical History:   Diagnosis Date     Abnormal electrocardiogram 3/23/2017     Balance problems      Bifascicular block 3/23/2017     Central retinal vein occlusion 4/11/2014     Choroidal nevus 4/11/2014     Constipation      Elevated prostate specific antigen (PSA) 2/20/2018     Family history of Parkinson's disease      GERD (gastroesophageal reflux disease)      Glaucoma 6/4/2015     Hearing loss      History of EMG 2/20/2018    [facility]  Nerve Conduction & EMG Report  Patient:       Eamon Whalen Patient ID:    [id number] Gender:        Male YOB: 1955 Age:           62 Years 2 Months    History & Examination:  62 year old man with history of Parkinson's disease, restless leg syndrome, and paresthesias in feet and legs. Query polyneuropathy. His clinical examination      Lumbago      Parkinson's disease (H)      Prolonged Q-T interval on ECG 3/23/2017     Urinary urgency      Wears glasses       Social History     Socioeconomic History     Marital status: Single     Spouse name: Not on file     Number of children: Not on file     Years of education: Not on file     Highest education level: Not on file   Occupational History     Occupation: farm     Employer: SELF EMPLOYED.   Social Needs     Financial resource strain: Not on file     Food insecurity     Worry: Not on file     Inability: Not on file     Transportation needs     Medical: Not on file     Non-medical: Not on file   Tobacco Use     Smoking status: Never Smoker     Smokeless tobacco: Never Used     Tobacco comment: cigar sometimes   Substance and Sexual Activity     Alcohol use: Yes     Comment: occ     Drug use: No     Sexual activity: Yes     Partners: Female   Lifestyle     Physical activity     Days per week: Not on file     Minutes per session: Not on file     Stress: Not on file   Relationships     Social connections     Talks on phone: Not on file     Gets together: Not on file     Attends Pentecostalism service: Not on file     Active member of club or organization: Not on file     Attends meetings of clubs or organizations: Not on file     Relationship status: Not on file     Intimate partner violence     Fear of current or ex partner: Not on file     Emotionally abused: Not on file     Physically abused: Not on file     Forced sexual activity: Not on file   Other Topics Concern     Parent/sibling w/ CABG, MI or angioplasty before 65F 55M? Not Asked   Social History Narrative    1 daughter who is  and lives in [place]    She was pregnant with the 1st child and had some problems in the 6-7 month of pregnancy with     Baby that may have hydrocephalus as there was IUGR. The baby  after 3 months in 2011. She was encouraged her to undergo genetic testing which was negative and they are considering having more children.     He met a woman and may  a woman from [place] - who has [place] roots. Her parents were in the gold  "mining business. He met her 3 times. Her name is Tavia and lives in [facility]. They will be visiting within a day or so with her mother. She is 36 yrs old. She is not going back there.     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 Depending on the status of the main house may build new house.         [place] in his home town had parkinson    Germ[place] lived on the [place] side of town        [facility] [place] of [place] miles.         This was way before pesticides        Maternal grandfather     Dax Sorenson - [place]ian name     He would have to be brought out to a rocking chair    \"hardening of there arteries.         mother     Family History   Problem Relation Age of Onset     Heart Disease Father      Dementia Mother      Other - See Comments Daughter         living in [facility]     Other - See Comments Brother      Other - See Comments Brother      Other - See Comments Sister      Other - See Comments Sister      Other - See Comments Sister      Parkinsonism Paternal Uncle      Parkinsonism Other         paternal great grand mother     Other - See Comments Grandchild         mora's son     Other - See Comments Grandchild         mora's son     Current Outpatient Medications   Medication Sig Dispense Refill     aspirin 81 MG EC tablet Take 81 mg by mouth daily       carbidopa-levodopa ER 61. MG PO CPCR per CR capsule Take 3 capsules by mouth 3 times daily 270 capsule 11     Cholecalciferol (VITAMIN D3) 50 MCG (2000 UT) CAPS Take 1 capsule by mouth daily  3     finasteride (PROSCAR) 5 MG tablet 5mg tab by mouth daily @ 9am 30 tablet 3     Fluocinolone Acetonide Scalp 0.01 % OIL oil Wet hair and apply to scalp and leave in for 4 hours and then wash off. May use 1 times per week 1 Bottle 11     ketoconazole (NIZORAL) 2 % cream Apply topically daily Apply topical daily as needed 60 g " 11     ketoconazole (NIZORAL) 2 % shampoo Apply to the affected area and wash off after 5 minutes. 120 mL 11     latanoprost (XALATAN) 0.005 % ophthalmic solution One drop in both eyes @ 9pm 2.5 mL 1     mirabegron (MYRBETRIQ) 25 MG 24 hr tablet Take 1 tablet (25 mg) by mouth daily 30 tablet 11     QUEtiapine (SEROQUEL) 25 MG tablet 25MG BY MOUTH NIGHTLY @ 9PM. 90 tablet 5     rOPINIRole (REQUIP ER) 2 MG 24 hr tablet TAKE 1 TABLET BY MOUTH EVERY NIGHT AT 9PM. 30 tablet 5     tadalafil (CIALIS) 20 MG tablet TAKE 1 TABLET BY MOUTH 1 HOUR BEFORE ACTIVITY AS NEEDED.  12     tamsulosin (FLOMAX) 0.4 MG capsule 0.4mg tab by mouth daily @ 9am 90 capsule 3     timolol (TIMOPTIC) 0.5 % ophthalmic solution 1 drop both eyes daily @ 9am 1 Bottle 11

## 2020-04-30 ENCOUNTER — TELEPHONE (OUTPATIENT)
Dept: NEUROLOGY | Facility: CLINIC | Age: 65
End: 2020-04-30

## 2020-04-30 ENCOUNTER — ALLIED HEALTH/NURSE VISIT (OUTPATIENT)
Dept: PHARMACY | Facility: CLINIC | Age: 65
End: 2020-04-30
Payer: COMMERCIAL

## 2020-04-30 ENCOUNTER — VIRTUAL VISIT (OUTPATIENT)
Dept: NEUROLOGY | Facility: CLINIC | Age: 65
End: 2020-04-30
Payer: COMMERCIAL

## 2020-04-30 DIAGNOSIS — R32 URINARY INCONTINENCE, UNSPECIFIED TYPE: ICD-10-CM

## 2020-04-30 DIAGNOSIS — N40.0 BENIGN PROSTATIC HYPERPLASIA, UNSPECIFIED WHETHER LOWER URINARY TRACT SYMPTOMS PRESENT: ICD-10-CM

## 2020-04-30 DIAGNOSIS — F29 PSYCHOSIS, UNSPECIFIED PSYCHOSIS TYPE (H): ICD-10-CM

## 2020-04-30 DIAGNOSIS — G20.A1 PARALYSIS AGITANS (H): ICD-10-CM

## 2020-04-30 DIAGNOSIS — G20.A1 PARALYSIS AGITANS (H): Primary | ICD-10-CM

## 2020-04-30 DIAGNOSIS — G20.A1 PARKINSON DISEASE (H): Primary | ICD-10-CM

## 2020-04-30 PROCEDURE — 99605 MTMS BY PHARM NP 15 MIN: CPT | Performed by: PHARMACIST

## 2020-04-30 NOTE — TELEPHONE ENCOUNTER
[facility] Call Center    Phone Message    May a detailed message be left on voicemail: yes     Reason for Call: And, pt needs help from provider, Sheree Neumann, as well. Pt had tried a few times to connect to our clinic,his phone connection was not good,difficult to hear and understand the pt. Please call pt back to help him. Writer verfied pt's ph# in chart. Thank you.      Action Taken: Message routed to:  [facility] ([facility]  Neurology    Travel Screening: Not Applicable

## 2020-04-30 NOTE — LETTER
4/30/2020       RE: Eamon Whalen  [address]  [place] [address]     Dear Colleague,    Thank you for referring your patient, Eamon Whalen, to the [facility] NEUROLOGY at [facility]. Please see a copy of my visit note below.        VIDEO VISIT - had to be switched to a phone visit    Date of Visit: April 30, 2020  Name: Eamon Whalen  Date of Birth 1955  [phone] (M)  [phone] (H)  [email or web address]  Has mychart  No clear daughter has set up mychart  Marlin Treviño daughter  [phone] mobile    Sent [employer] message    Assessment:  (G20) Parkinson disease (H)  (primary encounter diagnosis)  Comment:     Zully younger sister in [place] ?  [phone]       Medications     9am 3pm 9pm         Aspirin 81mg 1               Carbidopa-levodopa ER rytary 245mg 3 3 3         Cholecalciferol vitamin d3 50 mcg (2000 units)             Finasteride proscar 5mg 1           Fluocinolone acetonide derma-smoothe/FS Body 0.01% oil   has not tried               Ibuprofen advil/motrin 600mg Not using           Ketoconazole nizoral 2% cream  not sure if using                Ketoconazole nizoral 2% shampoo   not sure is unit(s) sing               Lactobacillus acidophilus probiotic             Latanoprost xalatan 0.005% opthalmic solution       Both eyes         Mirabegron myrbetriq 25mg 24 hr tablet           Quetiapine seroquel 25mg     1 at times       Ropinirole requip XL 2mg TB24       1         Tadalafil cialis 20mg  Not yet           Tamsulosin flomax 0.4mg 1               Timolol timoptic 0.5% ophthalmic solution Both eyes                                                                     Plan:    Due to confusion visit  Had to be cut short  Will need family and home care services to help patient.                   Documentation of a Face-to-Face Physician Encounter April 30, 2020    Eamon Whalen  1955  [id number]    I certify that this patient is  under my care and that I, or a nurse practitioner or physician's assistant working with me, had a face-to-face encounter that meets the physician face-to-face encounter requirements with this patient on: 4/30/2020.    The encounter with the patient was in whole, or in part, for the following medical condition, which is the primary reason for home health care:  Patient Active Problem List   Diagnosis     Paralysis agitans (H)     Wears glasses     Balance problems     Constipation     GERD (gastroesophageal reflux disease)     Urinary urgency     Insomnia     Seborrhea     Lumbago     Hearing loss     Family history of Parkinson's disease     Central retinal vein occlusion     Choroidal nevus     Vision problem -- Right Eye     ED (erectile dysfunction)     Other specified glaucoma     Abnormal electrocardiogram     Bifascicular block     Prolonged Q-T interval on ECG     History of EMG     Encounter for examination for driving license     Parkinson disease (H)     Elevated prostate specific antigen (PSA)     Family history of epilepsy and other diseases of the nervous system     Gastroesophageal reflux disease     Impotence of organic origin     Other abnormalities of gait and mobility     Personal history of other medical treatment (CODE)     Benign neoplasm of choroid     Weakness       I certify that, based on my findings, the following services are medically necessary home health services: Nursing, Occupational Therapy, Physical Therapy and Social Work    My clinical findings support the need for the above services because: parkinson/cognitive changes    Further, I certify that my clinical findings support that this patient is homebound (i.e. absences from home require considerable and taxing effort and are for medical reasons or [employer] services or infrequently or of short duration when for other reasons) because: parkinson    Physician signature ___________________________________   April 30, 2020  Physician  "name: Edison Villasenor MD    Fax ([phone]) or scan/email ([email or web address]) this completed document to [facility] within 24 hours of the referral date.  Questions: [phone].      I have reviewed the note as documented above.  This accurately captures the substance of my conversation with the patient.  Patient contact time 15  minutes. Over 50% of this visit was spent in patient care and care coordination.     230pm - 245pm    Edison Villasenor MD      ------------------------------------------------------------------------------------------------------------------------------------------------------------------------      Telephone-Visit Details    The patient has been notified of following:     \"After a review of the patient s situation, this visit was changed from an in-person visit to a telephone visit to reduce the risk of COVID-19 exposure.   The patient is being evaluated via a billable telephone visit.\"    \"This Telephone visit will be conducted via a call between you and your physician/provider. We have found that certain health care needs can be provided without the need for an in-person physical exam.  This service lets us provide the care you need with a telephone  conversation.  If a prescription is necessary we can send it directly to your pharmacy.  If lab work is needed we can place an order for that and you can then stop by our lab to have the test done at a later time.    If during the course of the call the physician/provider feels a telephone visit is not appropriate, you will not be charged for this service.\"     Patient has given verbal consent for Telephone visit? Yes    Type of service:  Telephone visit     Duration of Visit:     Originating Location (pt. Location): -    Distant Location (provider location):  [facility] NEUROLOGY     Mode of Communication:  " Telephone      --------------------------------------------------------------------------------------------------------------    Eamon Whalen is a 64 year old male who is being evaluated via a billable video visit.      Charts reviewed  Consult from  Images reviewed        I have reviewed and updated the patient's Past Medical History, Social History, Family History and Medication List.    ALLERGIES  Cats and Dogs    Lasts visit details if there was a last visit:     MERCEDES: January 30, 2020     [employer]  Dr John's assistant was helping with this but the [employer] business was still confused.      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   Lives in [facility]      This past while he has had problems with urination that is affecting his sleep     Not sure about his prostate/bladder medications and if he is taking both the finasteride/proscar and tamsulosin/flomax.      Not sure if has been on mirabegron     He reportedly is going to see a urologist in [place] but he is unable to recall his         Medications     9am 3pm 9pm         Aspirin 81mg 1               Carbidopa-levodopa ER rytary 245mg 3 3 3         Cholecalciferol vitamin d3 50 mcg (2000 units)             Finasteride proscar 5mg 1           Fluocinolone acetonide derma-smoothe/FS Body 0.01% oil   has not tried               Ibuprofen advil/motrin 600mg Not using           Ketoconazole nizoral 2% cream  not sure if using                Ketoconazole nizoral 2% shampoo   not sure is unit(s) sing               Lactobacillus acidophilus probiotic             Latanoprost xalatan 0.005% opthalmic solution       Both eyes         Mometasone elocon 0.1% cream NOT SURE IF USING            Quetiapine seroquel 25mg     1 at times       Ropinirole requip XL 2mg TB24       1         Tadalafil cialis 20mg  Not yet           Tamsulosin flomax 0.4mg 1               Terbinafine lamisil 250mg stopped    "        Terbinafine lamisil 1% cream Not using           Timolol timoptic 0.5% ophthalmic solution Both eyes               Ventolin  (90 base) mcg/act inhaler                                                        History obtained from patient     Blood pressure is still slightly elevated with recheck today - 148/84  He may want to see Dr. John to see if the blood pressure remains high - as this may impact whether it is worth trying mirabegron (myrbetriq) for his bladder - This medication usually is started at 25mg dose per day     Continue the ropinirole XL at 2mg for now     He should take the quetiapine 25mg at night and will need to continue to monitor for hallucinations as we may want to increase this at some point. He is presently taking it variably.     Right now not clear what if any prostate/bladder medications he is taking and so Sheree Neumann will reach out to the team that is setting up his pills to see if he is taking tamsulosin/flomax and/or finasteride/proscar.      He tentatively has a urology appointment in [place] but not clear with whom and when.      Return back in 3 months with me or Roshni Gonzales along with Sheree Neumann, Pharm D - as his insurance only allows pharmacist involvement face-to-face. He will need her involvement in addressing his nocturia if the urologist is not able to sort this out. The bladder issue is the significant factor affecting his sleep presently.      ecg today showed RBBB and QTc of 472     Addendum from Sheree Neumann:     \"Spoke with his home care nurse and updated the med list. He is taking Proscar and Flomax. The most recent BP's she had taken were pretty good so I think we could start Myrbetriq. I verified there isn't a QT prolongation risk. Do you want to prescribe Myrbetriq or do you want him to go to urology?     If prescribing Myrbetriq, it should be sent to [facility] pharmacy and we should have Jeaneth or Jolene fax the order to [facility] at " "[phone].\"      Medications                                                                                                                                                                                                              14 Review of systems  are negative except for   Patient Active Problem List   Diagnosis     Paralysis agitans (H)     Wears glasses     Balance problems     Constipation     GERD (gastroesophageal reflux disease)     Urinary urgency     Insomnia     Seborrhea     Lumbago     Hearing loss     Family history of Parkinson's disease     Central retinal vein occlusion     Choroidal nevus     Vision problem -- Right Eye     ED (erectile dysfunction)     Other specified glaucoma     Abnormal electrocardiogram     Bifascicular block     Prolonged Q-T interval on ECG     History of EMG     Encounter for examination for driving license     Parkinson disease (H)     Elevated prostate specific antigen (PSA)     Family history of epilepsy and other diseases of the nervous system     Gastroesophageal reflux disease     Impotence of organic origin     Other abnormalities of gait and mobility     Personal history of other medical treatment (CODE)     Benign neoplasm of choroid     Weakness        Allergies   Allergen Reactions     Cats      Dogs      Past Surgical History:   Procedure Laterality Date     NO HISTORY OF SURGERY       Past Medical History:   Diagnosis Date     Abnormal electrocardiogram 3/23/2017     Balance problems      Bifascicular block 3/23/2017     Central retinal vein occlusion 4/11/2014     Choroidal nevus 4/11/2014     Constipation      Elevated prostate specific antigen (PSA) 2/20/2018     Family history of Parkinson's disease      GERD (gastroesophageal reflux disease)      Glaucoma 6/4/2015     Hearing loss      History of EMG 2/20/2018    [facility]  Nerve Conduction & EMG Report  Patient:       Eamon Whalen Patient ID:    [id number] " Gender:        Male YOB: 1955 Age:           62 Years 2 Months    History & Examination:  62 year old man with history of Parkinson's disease, restless leg syndrome, and paresthesias in feet and legs. Query polyneuropathy. His clinical examination      Lumbago      Parkinson's disease (H)      Prolonged Q-T interval on ECG 3/23/2017     Urinary urgency      Wears glasses      Social History     Socioeconomic History     Marital status: Single     Spouse name: Not on file     Number of children: Not on file     Years of education: Not on file     Highest education level: Not on file   Occupational History     Occupation: farm     Employer: SELF EMPLOYED.   Social Needs     Financial resource strain: Not on file     Food insecurity     Worry: Not on file     Inability: Not on file     Transportation needs     Medical: Not on file     Non-medical: Not on file   Tobacco Use     Smoking status: Never Smoker     Smokeless tobacco: Never Used     Tobacco comment: cigar sometimes   Substance and Sexual Activity     Alcohol use: Yes     Comment: occ     Drug use: No     Sexual activity: Yes     Partners: Female   Lifestyle     Physical activity     Days per week: Not on file     Minutes per session: Not on file     Stress: Not on file   Relationships     Social connections     Talks on phone: Not on file     Gets together: Not on file     Attends [employer] service: Not on file     Active member of club or organization: Not on file     Attends meetings of clubs or organizations: Not on file     Relationship status: Not on file     Intimate partner violence     Fear of current or ex partner: Not on file     Emotionally abused: Not on file     Physically abused: Not on file     Forced sexual activity: Not on file   Other Topics Concern     Parent/sibling w/ CABG, MI or angioplasty before 65F 55M? Not Asked   Social History Narrative    1 daughter who is  and lives in [place]    She was pregnant with the  "1st child and had some problems in the 6-7 month of pregnancy with     Baby that may have hydrocephalus as there was IUGR. The baby  after 3 months in 2011. She was encouraged her to undergo genetic testing which was negative and they are considering having more children.     He met a woman and may  a woman from [place] - who has [place] roots. 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 He met her 3 times. Her name is Tavia and lives in [facility] [place]. They will be visiting within a day or so with her mother. She is 36 yrs old. She is not going back there.     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 Depending on the status of the main house may build new house.         [place]att in his home town had parkinson    [place] lived [place] [facility] [place] miles.         This was way before pesticides        Maternal grandfather     Dax Sorenson - Irish name     He would have to be brought out to a rocking chair    \"hardening of there arteries.         mother     Family History   Problem Relation Age of Onset     Heart Disease Father      Dementia Mother      Other - See Comments Daughter         living in [facility]     Other - See Comments Brother      Other - See Comments Brother      Other - See Comments Sister      Other - See Comments Sister      Other - See Comments Sister      Parkinsonism Paternal Uncle      Parkinsonism Other         paternal great grand mother     Other - See Comments Grandchild         mora's son     Other - See Comments Grandchild         mora's son     Current Outpatient Medications   Medication Sig Dispense Refill     aspirin 81 MG EC tablet Take 81 mg by mouth daily       carbidopa-levodopa ER 61. MG PO CPCR per CR capsule Take 3 capsules by mouth 3 times daily 270 capsule 11     Cholecalciferol (VITAMIN " D3) 50 MCG (2000 UT) CAPS Take 1 capsule by mouth daily  3     finasteride (PROSCAR) 5 MG tablet 5mg tab by mouth daily @ 9am 30 tablet 3     Fluocinolone Acetonide Scalp 0.01 % OIL oil Wet hair and apply to scalp and leave in for 4 hours and then wash off. May use 1 times per week 1 Bottle 11     ketoconazole (NIZORAL) 2 % cream Apply topically daily Apply topical daily as needed 60 g 11     ketoconazole (NIZORAL) 2 % shampoo Apply to the affected area and wash off after 5 minutes. 120 mL 11     latanoprost (XALATAN) 0.005 % ophthalmic solution One drop in both eyes @ 9pm 2.5 mL 1     mirabegron (MYRBETRIQ) 25 MG 24 hr tablet Take 1 tablet (25 mg) by mouth daily 30 tablet 11     QUEtiapine (SEROQUEL) 25 MG tablet 25MG BY MOUTH NIGHTLY @ 9PM. 90 tablet 5     rOPINIRole (REQUIP ER) 2 MG 24 hr tablet TAKE 1 TABLET BY MOUTH EVERY NIGHT AT 9PM. 30 tablet 5     tadalafil (CIALIS) 20 MG tablet TAKE 1 TABLET BY MOUTH 1 HOUR BEFORE ACTIVITY AS NEEDED.  12     tamsulosin (FLOMAX) 0.4 MG capsule 0.4mg tab by mouth daily @ 9am 90 capsule 3     timolol (TIMOPTIC) 0.5 % ophthalmic solution 1 drop both eyes daily @ 9am 1 Bottle 11     Unable to reach patient via phone, he is in telemedicine appointment.  Lopez Lin, EMT      Sincerely,    Edison Villasenor MD

## 2020-04-30 NOTE — PROGRESS NOTES
MTM ENCOUNTER  SUBJECTIVE/OBJECTIVE:                           Eamon Whalen is a 64 year old male called for a follow-up visit. He was referred to me from Dr. Villasenor.  Today's visit is a follow-up MTM visit from 3/28/19     Patient consented to a telehealth visit: yes  Telemedicine Visit Details  Type of service:  Telephone visit  Start Time: 2:02 pm  End Time: 2:20 pm  Originating Location (pt. Location): Home  Distant Location (provider location):  [facility] NEUROLOGY CLINIC MTM  Mode of Communication:  Telephone    Chief Complaint: follow up PD    Allergies/ADRs: Reviewed in Epic  Tobacco:  reports that he has never smoked. He has never used smokeless tobacco.  Alcohol: 1-3 beverages / week  Caffeine: 1 cups/day of coffee  Activity: working on the farm  PMH: Reviewed in Epic    Medication Adherence/Access: FAYE Andrea from [facility] typically sets up the patient's medications; patient is unaware of what medications he is taking.     Attempted to conduct a full MTM visit with the patient today but he did not appear to be oriented, was confused at times about whether I was on the phone with him at the time or not, and did not answer questions directly when asked. He also had a telephone visit with Dr. Villasenor following MTM visit and it was deemed that the patient needs increased level of care/support at home. RN care coordinator and social work will be involved.     Parkinson's Disease/psychosis:  Current medications include: ropinirole ER 2 mg daily and Rytary 61. mg 3 capsules 3 times/day. He also takes quetiapine 25 mg nightly. Unable to fully assess physical symptoms of PD and patient appeared very confused during the visit today.     OAB/BPH: Taking finasteride 5 mg daily, tamsulosin 0.4 mg daily and Myrbetriq 25 mg daily. Patient did state that urinary frequency is still a concern for him. He reported that he was up 10 times last night using the bathroom.     Today's Vitals: There were no vitals taken  for this visit.  (Telemedicine visit)    ASSESSMENT:                            Medication Adherence: unable to assess with patient today but per telephone encounter dated 5/1/20 it appears home care is concerned about patient's lack of medication adherence.     Parkinson's Disease/psychosis:  Needs improvement. RN care coordinator and social work involved to assess safety of patient at home. Not able to fully assess efficacy of his medications today and would not make any further changes until we know that he is adherent to his medication regimen. Please see telephone encounter dated 5/1/20 for more information.     OAB/BPH: Needs improvement. Unknown if patient is adherent to his medications so would not adjust any of his medications at this time.     PLAN:                            RN care coordinator and social work to assess safety of patient's home situation and adherence to medication regimen. I can be involved as needed to assess appropriateness of medications after adequate home support is in place.     I spent 20 minutes with this patient today. All changes were made via collaborative practice agreement with Dr. Villasenor. A copy of the visit note was provided to the patient's referring provider.    Will follow up in 3 months or sooner if needed.    The patient declined a summary of these recommendations.     Sheree Neumann, Pharm.D.  Medication Therapy Management Pharmacist  Phone: [phone]

## 2020-04-30 NOTE — TELEPHONE ENCOUNTER
[facility] Call Center    Phone Message    May a detailed message be left on voicemail: yes     Reason for Call: Other: Pt calling in reqeusting a call back he would like to know if he will still have his appointment      Action Taken: Message routed to:  [facility] neuro     Travel Screening: Not Applicable

## 2020-05-01 ENCOUNTER — TELEPHONE (OUTPATIENT)
Dept: NEUROLOGY | Facility: CLINIC | Age: 65
End: 2020-05-01

## 2020-05-01 ENCOUNTER — PATIENT OUTREACH (OUTPATIENT)
Dept: CARE COORDINATION | Facility: CLINIC | Age: 65
End: 2020-05-01

## 2020-05-01 NOTE — PROGRESS NOTES
Social Work Documentation-Only Note  [facility]    Patient Name:  Eamon Whalen  /Age:  1955 (64 year old)    Referral Source: RN STEPHANIE Barnett in Neurology Clinic (Coverage for RENY Fall)   Reason for Referral:  Home services      Received request to reach out to pt's home care RN (Zully with [facility]) with info on how to get pt connected to [facility] services (see detailed notes from Jolene Barnett dated 20). Per chart review, RENY Fall has discussed waiver/PCA services for pt in the past with pt. Called Zully and provided number for [employer] Intake line. Provided education on waiver program and assessment process.     MARIA C Lester, [facility]  Clinical   Outpatient Speciality Clinics   [facility]  Ph. [phone]    NO LETTER

## 2020-05-01 NOTE — TELEPHONE ENCOUNTER
"Situation:  Ryan Whalen is a 64 year old male who receives support for Parkinson's disease. I am reaching out to ryan today due to severe confusion causing him to be unable to direct his care at his visit yesterday with Dr. Villasenor and Sheree Neumann, Pharm D.    Assessment:  Zully reports the confusion has been slowly worsening over the past 2 months. She reported he has not been taking his medications right and often misses doses. He is sometimes oriented to day and month at times but is otherwise \"completely confused\" per Zully. She reports while she was there he almost fell 3 times today and would have fallen if she was not there to assist him. She does not believe he is adequately eating or caring out other ADLs.    She has been discussing with him about getting into an assisted living facility and he completely refuses to talk about it. He has refused to use an automatic medication dispenser but today said he is willing to try it. His sister Christopher will be getting this. Christopher also takes him to  his medications.    Zully spoke with Christopher, Ryan and his daughter Marlin and they will set up a care conference for Ryan. A date is not set for the care conference but Zully is trying to get it to be next week.    Zully is not sure what to do at this point. She is leaning towards filing for vulnerable adult but will have a care conference wit his family first before deciding on this. Christopher has stated she will not force him to move if he does not want to. Zully is highly concerned about Ryan's safety if he were to continue living at home.    Zully reports [place] does not have social workers and they would not be able to provide this service for him. In order to get life line he would need a county worker which he does not have and Zully is not sure how to get him connected to one.    Plan/Recommendation:  1. Zully will call and update us after the care conference.    2. I will ask our  Claritza Bonner's to help get him help " with the [facility]. I will ask her to call you at [phone] to coordinate his care given his confusion and inability to do this as evidenced by his visits yesterday.    3. I informed Zully if after the care conference Eamon continues to refuse more help or moving to an assisted living facility then it would probably be a good idea to file for vulnerable adult because he is not safe at home and no longer can adequately care for himself.    4. If you need help regarding medications or the medication dispenser Eamon works with Sheree Neumann, DerrekD and she can provide help as needed.

## 2020-05-01 NOTE — TELEPHONE ENCOUNTER
[facility]    Phone Message    May a detailed message be left on voicemail: yes     Reason for Call: Other: Zully returning call. Please call her back to discuss.      Action Taken: Message routed to:  [facility]     Travel Screening: Not Applicable

## 2020-05-02 DIAGNOSIS — G20.A1 PARALYSIS AGITANS (H): ICD-10-CM

## 2020-05-04 ENCOUNTER — PATIENT OUTREACH (OUTPATIENT)
Dept: CARE COORDINATION | Facility: CLINIC | Age: 65
End: 2020-05-04

## 2020-05-04 RX ORDER — ROPINIROLE 2 MG/1
TABLET, FILM COATED, EXTENDED RELEASE ORAL
Qty: 30 TABLET | Refills: 5 | Status: SHIPPED | OUTPATIENT
Start: 2020-05-04 | End: 2020-11-23

## 2020-05-04 NOTE — TELEPHONE ENCOUNTER
Rx Authorization:    Requested Medication/ Dose: Ropinerole 2mg    Date last refill ordered: 11/11/2019    Quantity ordered: 30    # refills: 5    Date of last clinic visit with ordering provider: 4/30/20    Date of next clinic visit with ordering provider: none    All pertinent protocol data (lab date/result):     Include pertinent information from patients message:

## 2020-05-05 NOTE — PROGRESS NOTES
Social Work:  I left a message with Pt's home care nurse Zully, offering to attend a care conference by phone. Will await her return call.    MARIA C Unger, [facility]    [facility]  [phone]

## 2020-05-19 ENCOUNTER — PATIENT OUTREACH (OUTPATIENT)
Dept: CARE COORDINATION | Facility: CLINIC | Age: 65
End: 2020-05-19

## 2020-05-19 DIAGNOSIS — G20.A1 PARKINSON DISEASE (H): Primary | ICD-10-CM

## 2020-05-19 NOTE — PROGRESS NOTES
Social Work:   Placed a few calls to pt's home care nurse Zully and she returned my call today. She knows Eamon quite well. His sister is the local family contact. When Zully has contacted the dtr, she asks her to call Eamon's sister. Zully has not been successful in getting the family to have a meeting. Pt's sister told her several times that she's not willing to tell Eamon he has to move.     Things are a little better as he now has a locked medication machine. Eamon calls Zully all times of the day and night confused about the medication machine. It's clear to Zully that ne needs to move into assisted living. [place].   Reviewed my notes re previous conversations. He's very reluctant to have anything to do with the county. He would have to show his assets and sell his farm to pay for his care in [facility] which I think is also his concern.     Discussed with Zully that Pt can however, get PCA services thru his current health plan (until he starts medicare if eligible at the end of the year). I've discussed that in the past with him and Zully will give it a try again too.   She is considering an adult protection report shawn if he refuses PCA services.   She is visiting weekly and he is now also accepting a home health aid from their agency.   He doesn't have any future appts scheduled here. Neuro psych testing would be helpful.   He may need a provider to talk with him about his need to move into assisted living.     Zully is aware she can contact me again as needed. They don't have a SW with their agency.     MARIA C Unger, [facility]      [facility] [place]   [phone]

## 2020-06-10 ENCOUNTER — TELEPHONE (OUTPATIENT)
Dept: NEUROLOGY | Facility: CLINIC | Age: 65
End: 2020-06-10

## 2020-06-10 NOTE — TELEPHONE ENCOUNTER
[facility]    Phone Message    May a detailed message be left on voicemail: no     Reason for Call: Other: Zully from [facility] reports noticeably worse confusion from the Pt today. Pt told Zully he was somewhere else, not at home, when he was clearly home with her present, which is a first. She reports he could say his name and correct date. Please call Zully back to discuss for any questions.    Action Taken: Message routed to:  [facility]): [facility] NEUROLOGY ADULT [facility]    Travel Screening: Not Applicable

## 2020-06-18 NOTE — TELEPHONE ENCOUNTER
[facility]    Phone Message    May a detailed message be left on voicemail: yes    Reason for Call: Other: Zully from [facility] is returning Jolene's call; no return ph # left directo Jolene and she was not available via [employer]. Please call Zully back.     Action Taken: Message routed to:  [facility]): [facility] NEUROLOGY ADULT [facility]    Travel Screening: Not Applicable

## 2020-06-19 NOTE — TELEPHONE ENCOUNTER
[facility]    Phone Message    May a detailed message be left on voicemail: yes     Reason for Call: Other: Zully calling in returning missed call from best , she would like to inform she is free to take calls for the rest of the day, thank you      Action Taken: Message routed to:  [facility] neuro     Travel Screening: Not Applicable

## 2020-06-19 NOTE — TELEPHONE ENCOUNTER
Update  He does not have a POA or medical POA.  He is refusing PCA services, meal services etc. through the [facility] access hospital. He has said he does not want the Critical access hospital in his business. Yaron has explained the PCA and other services would solely be care for him covered by grants and nothing to do with his assets or moving him into a nursing home as he said he is worried about this.    A home health aid comes once a week to help him shower. Yaron comes once a week and fills his medication dispenser and continues to discuss these services with him. He has been much more compliant with medications. When yaron and the aide is there they have noticed food is all over the floor, he never has clean laundry. He has food in the fridge but she suspects he is not eating much. Some days he is okay when walking but other days he needs Yaron right beside him to stay steady. She suspects they only time he showers is when the aide is there but is not sure of this. His farm house is ill kept and he cannot upkeep it.    Yaron relays that Ryan will call her everyday and after hours with various questions and confusion on many different things. She worries about his safety living alone and refusing services for help.    Family Care conference update  His sister has not been wanting to do a care conference because he will get mad if she tells him he needs to leave his house. Yaron has brought this up to her numerous times and nothing has came out of it from ryan's sister. Ryan's daughter Marlin has said to contact the sister and does not want to be apart of it.    Plan/recommendation:  1. Yaron has tried multiple times to get Ryan connected to various Critical access hospital resources including a PCA for much needed help as well as hold a care conference with the family. She unfortunately has been unsuccessful with both. We agreed that due to the multiple health and safety concerns described above he will need a vunerable adult report submitted. Yaron will e-mail her  supervisor regarding this and next steps.    2. Zully will call us weekly to leave updates with the call center. If she needs to speak with us she will let them know a good time for us to call her back.

## 2020-06-19 NOTE — TELEPHONE ENCOUNTER
Left voice mail for Zully home health RN to call me back with a good time to call her today.    Called patient and left voice mail asking him to call back with a good time to call.
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
## 2020-06-25 NOTE — TELEPHONE ENCOUNTER
[facility] Call Center    Phone Message    May a detailed message be left on voicemail: yes     Reason for Call: Other: Zully returning a missed call from Katie. She has an update to discuss. She is available from now until 12 p.m. and anytime after 1 p.m. Please call her at your earliest convenience.     Action Taken: Message routed to:  [facility]):  NEUROLOGY    Travel Screening: Not Applicable

## 2020-06-25 NOTE — TELEPHONE ENCOUNTER
Zully stated she saw Eamon yesterday with her supervisor and her supervisor discussed options with him and he agreed to have support from the [facility] come out.  He is also considering [facility] (private pay) extended PCA services during the day.    If he declines this again after they visit next week Zully will be filing a VA.    His brother in-law agreed to help with some of the upkeep for the house and fixed the air conditioner.    I informed Zully I am concerned about Eamon being able to make his own healthcare decisions and be cognitively well enough to do his visits with the providers. The last visit he had with us had to be aborted due to his confusion about what was going on.  Zully has asked Christopher before to assist with Jerzy visits but she has said Eamon will get mad and not want her there.    Plan/recommendation:  1. The [facility] will do a home evaluation next week to determine what services Eamon needs. Eamon also is looking into a private pay PCA.    2. Zully will file a VA if Eamon refuses the services next week.    3. When Zully visits Eamon next she will ask Eamon about having Christopher accompany him to his doctors appointments. If he is agreeable they will call Christopher together and ask her.    4. Zully will update us after she see's him next week.

## 2020-06-29 ENCOUNTER — TELEPHONE (OUTPATIENT)
Dept: NEUROLOGY | Facility: CLINIC | Age: 65
End: 2020-06-29

## 2020-06-29 NOTE — TELEPHONE ENCOUNTER
Health Call Center    Phone Message    May a detailed message be left on voicemail: yes     Reason for Call: Other: Eamon calling due to missing a phone call. He is not sure who it was from, but he is a patient of Dr. Villasenor. Please call him back at your earliest convenience. He also has a question regarding a liquid medication by mouth for nasal decongestion. He can not read what is on the bottle to let me know the name of it.    Action Taken: Message routed to:  [facility]):  NEUROLOGY    Travel Screening: Not Applicable

## 2020-07-15 ENCOUNTER — MEDICAL CORRESPONDENCE (OUTPATIENT)
Dept: HEALTH INFORMATION MANAGEMENT | Facility: CLINIC | Age: 65
End: 2020-07-15

## 2020-07-22 DIAGNOSIS — G20.A1 PARALYSIS AGITANS (H): ICD-10-CM

## 2020-07-22 RX ORDER — QUETIAPINE FUMARATE 25 MG/1
TABLET, FILM COATED ORAL
Qty: 30 TABLET | Refills: 0 | Status: SHIPPED | OUTPATIENT
Start: 2020-07-22 | End: 2020-08-18

## 2020-07-22 NOTE — TELEPHONE ENCOUNTER
Rx Authorization:    Requested Medication/ Dose: Seroquel 25    Date last refill ordered: 11/8/18    Quantity ordered: 90    # refills: 5    Date of last clinic visit with ordering provider: 4/30/20    Date of next clinic visit with ordering provider: 0    All pertinent protocol data (lab date/result):     Include pertinent information from patients message:

## 2020-08-04 ENCOUNTER — PATIENT OUTREACH (OUTPATIENT)
Dept: CARE COORDINATION | Facility: CLINIC | Age: 65
End: 2020-08-04
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
## 2020-08-12 ENCOUNTER — MEDICAL CORRESPONDENCE (OUTPATIENT)
Dept: HEALTH INFORMATION MANAGEMENT | Facility: CLINIC | Age: 65
End: 2020-08-12

## 2020-08-12 ENCOUNTER — TELEPHONE (OUTPATIENT)
Dept: NEUROLOGY | Facility: CLINIC | Age: 65
End: 2020-08-12
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
## 2020-08-17 ENCOUNTER — MEDICAL CORRESPONDENCE (OUTPATIENT)
Dept: HEALTH INFORMATION MANAGEMENT | Facility: CLINIC | Age: 65
End: 2020-08-17

## 2020-08-17 DIAGNOSIS — G20.A1 PARALYSIS AGITANS (H): ICD-10-CM

## 2020-08-18 RX ORDER — QUETIAPINE FUMARATE 25 MG/1
TABLET, FILM COATED ORAL
Qty: 30 TABLET | Refills: 5 | Status: SHIPPED | OUTPATIENT
Start: 2020-08-18 | End: 2020-10-27

## 2020-08-18 NOTE — TELEPHONE ENCOUNTER
-- DO NOT REPLY / DO NOT REPLY ALL --  -- Message is from the [facility]--    COVID-19 Universal Screening: Negative    General Patient Message      Reason for Call:   Patient needs an appointment to be seen by specialist for intermittent naval pain and vaginal discharge S/P Bilateral fallopian tube; tubal ligation and removal of IUD on 07/07/2020 per patient.  Please call patient to discuss further.  Thank you.    Caller Information       Type Contact Phone    08/18/2020 04:05 PM CDT Phone (Incoming) Barbara Hayes (Self) [phone] (H)          Alternative phone number:     Turnaround time given to caller:   \"This message will be sent to [state Provider's name]. The clinical team will fulfill your request as soon as they review your message when the office opens tomorrow.\"     Patient also spoke with Jolene Barnett RN today and he will await a call from clinic coordinators to schedule appointments in neurology.     Sheree Neumann, Pharm.D.  Medication Therapy Management Pharmacist  Phone: [phone]

## 2020-08-18 NOTE — TELEPHONE ENCOUNTER
Rx Authorization:    Requested Medication/ Dose QUEtiapine (SEROQUEL) 25 MG tablet     Date last refill ordered: 7/22/20    Quantity ordered: 30    # refills: 0    Date of last clinic visit with ordering provider: 4/30/20    Date of next clinic visit with ordering provider:     All pertinent protocol data (lab date/result):     Include pertinent information from patients message:

## 2020-09-14 ENCOUNTER — TELEPHONE (OUTPATIENT)
Dept: NEUROLOGY | Facility: CLINIC | Age: 65
End: 2020-09-14

## 2020-09-14 NOTE — TELEPHONE ENCOUNTER
Central Prior Authorization Team   Phone: [phone]      PA Initiation    Medication: carbidopa-levodopa ER (RYTARY) 61. MG CPCR per CR capsule   Insurance Company: [place] Medicaid ([facility]) - Phone [phone] Fax [phone]  Pharmacy Filling the Rx: [id number] [place], [place] - [address] [place] [address]  Filling Pharmacy Phone: [phone]  Filling Pharmacy Fax:    Start Date: 9/14/2020

## 2020-09-14 NOTE — LETTER
September 15, 2020    RE: Eamon Whalen  [address]  [place] [address]  1955    To whom this may concern:      We are asking for consideration of an appeal for our patient, Eamon Whalen, for Rytary 61. mg capsules 3 capsules three times daily for treatment of Parkinson's Disease motor fluctuations. The immediate-release carbidopa-levodopa is not adequately controlling his akinesia overnight nor motor symptoms during the day.      The alternative agents listed in the PA denial decision are both dopamine agonists (bromocriptine and pramipexole), yet he is already taking a dopamine agonist, ropinirole 2mg daily. It would be inappropriate for him to take multiple dopamine agonists concurrently.      Therefore, we are appealing this PA denial decision and would like for you to re-consider approval of Rytary for this patient's Parkinson's Disease.      Thank you,    Edison Villasenor MD  Professor of Neurology  [employer]  Movement Disorders [facility]

## 2020-09-14 NOTE — TELEPHONE ENCOUNTER
Pharmacy has started a Prior Authorization request via Cover My Meds for - Rytary 61.25-245MG er capsules, Key: [id number]

## 2020-09-15 NOTE — TELEPHONE ENCOUNTER
PRIOR AUTHORIZATION DENIED    Medication: carbidopa-levodopa ER (RYTARY) 61. MG CPCR per     Denial Date: 9/14/2020    Denial Rational:         Appeal Information:

## 2020-09-15 NOTE — TELEPHONE ENCOUNTER
Medication Appeal Initiation    We have initiated an appeal for the requested medication:  Medication: carbidopa-levodopa ER (RYTARY) 61. MG   Appeal Start Date:  9/15/2020  Insurance Company: [place] Medicaid ([facility]) - Phone [phone] Fax [phone]  Comments:  Appeal has been initiated.  I have faxed original denial letter along with Letter of Medical Necessity (letters tab) [employer] - Attn: , fax# [phone]. For follow up, call [phone]. Marked for urgent review.

## 2020-09-16 NOTE — TELEPHONE ENCOUNTER
Appeal/Reconsideration is Denied  Denial HID# [id number]    Medication: carbidopa-levodopa ER (RYTARY) 61. MG CPCR     Denial Date: 9/15/2020    Denial Rational: See below for covered alternatives and link to formulary          Second Level Appeal Information:  Second level appeals will be managed by the clinic staff and provider. Please contact the [employer] Prior Authorization Team if additional information about the denial is needed.      Denial HID# [id number]        Below are covered alternatives that do not require a Prior Auth. For a complete list of meds including other options that may be covered but will require a Prior Auth please see the [place] Medicaid formulary, [email or web address]_search.[email or web address]    Preferred Alternatives: Carbidopa/Levodopa, Carbidopa/Levodopa ER, Pramipexole IR, and Ropinirole.  Covered Alternatives, but not necessarily preferred: Amantadine, Apokyn Benztropine, Bromocriptine/Parlodel, Carbidopa, Carbidopa/Levodopa/Entacapone, Comtan, Entacapone, Selegiline, Stalevo, and Trihexyphenidyl.

## 2020-09-21 ENCOUNTER — TELEPHONE (OUTPATIENT)
Dept: NEUROLOGY | Facility: CLINIC | Age: 65
End: 2020-09-21

## 2020-09-21 NOTE — TELEPHONE ENCOUNTER
Central Prior Authorization Team   Phone: [phone]      PA Initiation    Medication: mirabegron (MYRBETRIQ) 25 MG 24 hr tablet   Insurance Company: [place] Medicaid ([facility]) - Phone [phone] Fax [phone]  Pharmacy Filling the Rx: [facility][id number] [place], [place] - [address] [place] [address]  Filling Pharmacy Phone: [phone]  Filling Pharmacy Fax: [phone]  Start Date: 9/21/2020

## 2020-09-21 NOTE — TELEPHONE ENCOUNTER
Pharmacy has started a Prior Authorization request via Cover My Meds for - rOPINIRole HCl ER 2MG er tablets, Key: [id number]

## 2020-09-21 NOTE — TELEPHONE ENCOUNTER
Central Prior Authorization Team   Phone: [phone]      PA Initiation    Medication: rOPINIRole (REQUIP ER) 2 MG 24 hr tablet   Insurance Company: [place] Medicaid ([facility]) - Phone [phone] Fax [phone]  Pharmacy Filling the Rx: [facility] #[id number] - [address]  Filling Pharmacy Phone: [phone]  Filling Pharmacy Fax: [phone]  Start Date: 9/21/2020

## 2020-09-21 NOTE — TELEPHONE ENCOUNTER
Pharmacy has started a Prior Authorization request via Cover My Meds for - Myrbetriq 25MG er tablets, Key: [id number]

## 2020-09-22 NOTE — TELEPHONE ENCOUNTER
Medication Appeal Initiation    We have initiated an appeal for the requested medication:  Medication: rOPINIRole (REQUIP ER) 2 MG 24 hr tablet   Appeal Start Date:  9/22/2020  Insurance Company: [place] Medicaid ([facility]) - Phone [phone] Fax [phone]  Comments:  Appeal has been initiated.  I have faxed original denial letter along with Letter of Medical Necessity (letters tab) to [employer] - Attn: , fax# [phone]. For follow up, call [phone]. Marked for urgent review.

## 2020-09-22 NOTE — TELEPHONE ENCOUNTER
PRIOR AUTHORIZATION DENIED    Medication: rOPINIRole (REQUIP ER) 2 MG 24 hr tablet     Denial Date: 9/22/2020    Denial Rational:         Appeal Information:

## 2020-09-22 NOTE — TELEPHONE ENCOUNTER
Prior Authorization Approval    Authorization Effective Date: 9/21/2020  Authorization Expiration Date: 9/15/2021  Medication: mirabegron (MYRBETRIQ) 25 MG 24 hr tablet   Approved Dose/Quantity: 30  Reference #: PA# [id number]   Insurance Company: [place] Medicaid ([facility]) - Phone [phone] Fax [phone]   Which Pharmacy is filling the prescription (Not needed for infusion/clinic administered): [id number], [address]  Pharmacy Notified: Yes - via voicemail  Patient Notified:

## 2020-09-30 NOTE — TELEPHONE ENCOUNTER
MEDICATION APPEAL APPROVED - have not received approval letter, so called Medicaid. Spoke to rep Nice who says they did fax a letter (we didn't receive), and gave me the approval dates and P/A #.  Tried calling pharmacy with approval info, but they are closed and no option for voicemail. I have faxed approval information to them with my contact info if needing anything further.    Medication: rOPINIRole (REQUIP ER) 2 MG 24 hr tablet   Authorization Effective Date:  09/21/2020  Authorization Expiration Date:  09/15/2021  Approved Dose/Quantity:  Reference #: P/A# [id number]  Insurance Company: [place] Medicaid ([facility]) - Phone [phone] Fax [phone]  Which Pharmacy is filling the prescription (Not needed for infusion/clinic administered): [id number] - [place] ([facility], [place] - [address] [place] [address]

## 2020-10-20 RX ORDER — MOXIFLOXACIN HCL 0.5 %
DROPS OPHTHALMIC (EYE)
COMMUNITY
Start: 2020-09-18 | End: 2020-10-27

## 2020-10-20 RX ORDER — FLUTICASONE PROPIONATE 50 MCG
SPRAY, SUSPENSION (ML) NASAL
COMMUNITY
Start: 2020-10-16 | End: 2020-10-27

## 2020-10-20 RX ORDER — ERYTHROMYCIN 5 MG/G
OINTMENT OPHTHALMIC
COMMUNITY
Start: 2020-09-13 | End: 2020-10-27

## 2020-10-20 NOTE — PROGRESS NOTES
VIDEO VISIT - doxity.     Date of Visit: October 27, 2020  Name: Eamon Whalen  Date of Birth 1955  [address]   [place] [address]  [phone] (M)  [phone] (H)  [email or web address]  Has mychart  No clear daughter has set up mychart  Marlin Treviño daughter - not involved  [phone] mobile    younger sister in [place]  [phone]     Zully - visiting once per week to fill medications  [phone]    People involved in care.   Christopher Blevinsjasvir His sister - phone and Joe her  - [phone]  Pee Koby - brother    Assessment:  (G20) Paralysis agitans (H)  (primary encounter diagnosis)  Carbidopa/levodopa rytary 245mg 3 tabs 3/day  Ropinirole requip ER 2mg 24 hr at night    Hallucinations/delusions  Quetiapine seroquel 25mg - only taking 1  8/12/2020 went to ED  2 weeks ago had hallucinations    Cognition  He has not been on rivastigmine    Bladder  Finasteride proscar 5mg  mirabegron myrbetriq 25mg  tamsulosin flomax 0.4mg       Medications     9am 3pm 9pm         Aspirin 81mg 1               Carbidopa-levodopa ER rytary 245mg 3 3 3         Cholecalciferol vitamin d3 50 mcg (2000 units)  1           Finasteride proscar 5mg 1           Fluticasone flonase 50 mcg/act nasal spray As needed       Latanoprost xalatan 0.005% opthalmic solution       Both eyes         Mirabegron myrbetriq 25mg 24 hr tablet             Quetiapine seroquel 25mg     1       Ropinirole requip XL 2mg TB24       1         Tamsulosin flomax 0.4mg       1         Timolol timoptic 0.5% ophthalmic solution Both eyes      just right eye                                                    Plan:    Increase seroquel from 25mg to 50mg at night and will need feedback in 1-2 weeks from Zully Cadena RN or/and sister Christopher Brown and brother Pee     LISA consultation with Sheree Neumann to connect with patient and Zully Cadena    May consider use of the rivastigmine patch depending on how things are going with  "the seroquel.     Needs ecg to followup on his QTc    Return to see me in 3 months.    I have reviewed the note as documented above.  This accurately captures the substance of my conversation with the patient.    Patient contact time  25  minutes. Over 50% of this visit was spent in patient care and care coordination.   Time of visit 940am - 1010am    Edison Villasenor MD      ------------------------------------------------------------------------------------------------------------------------------------------------------------------------    Video-Visit Details    The patient has been notified of following:     \"After a review of the patient s situation, this visit was changed from an in-person visit to a video visit to reduce the risk of COVID-19 exposure.   The patient is being evaluated via a billable video visit.\"    \"This video visit will be conducted via a call between you and your physician/provider. We have found that certain health care needs can be provided without the need for an in-person physical exam.  This service lets us provide the care you need with a video conversation.  If a prescription is necessary we can send it directly to your pharmacy.  If lab work is needed we can place an order for that and you can then stop by our lab to have the test done at a later time.    If during the course of the call the physician/provider feels a video visit is not appropriate, you will not be charged for this service.\"     Patient has given verbal consent for Video visit? Yes    Patient would like the video invitation sent by:     Type of service:  Video Visit    Video Start Time:     Video End Time (time video stopped):     Duration:  minutes - see above    Originating Location (pt. Location):     Distant Location (provider location):  [facility]UROLOG[facility]     Mode of Communication:  Video Conference via [employer] (and if not possible then doximity)      Edison Villasenor" MD      --------------------------------------------------------------------------------------------------------------    Eamon Whalen is a 64 year old male who is being evaluated via a billable video visit.      Charts reviewed  Consult from  Images reviewed        I have reviewed and updated the patient's Past Medical History, Social History, Family History and Medication List.    ALLERGIES  Cats and Dogs    Lasts visit details if there was a last visit:         Medications                                                                                                                                                                                                              14 Review of systems  are negative except for   Patient Active Problem List   Diagnosis     Paralysis agitans (H)     Wears glasses     Balance problems     Constipation     GERD (gastroesophageal reflux disease)     Urinary urgency     Insomnia     Seborrhea     Lumbago     Hearing loss     Family history of Parkinson's disease     Central retinal vein occlusion     Choroidal nevus     Vision problem -- Right Eye     ED (erectile dysfunction)     Other specified glaucoma     Abnormal electrocardiogram     Bifascicular block     Prolonged Q-T interval on ECG     History of EMG     Encounter for examination for driving license     Parkinson disease (H)     Elevated prostate specific antigen (PSA)     Family history of epilepsy and other diseases of the nervous system     Gastroesophageal reflux disease     Impotence of organic origin     Other abnormalities of gait and mobility     Personal history of other medical treatment (CODE)     Benign neoplasm of choroid     Weakness        Allergies   Allergen Reactions     Cats      Dogs      Past Surgical History:   Procedure Laterality Date     NO HISTORY OF SURGERY       Past Medical History:   Diagnosis Date     Abnormal electrocardiogram 3/23/2017     Balance problems      Bifascicular block  3/23/2017     Central retinal vein occlusion 4/11/2014     Choroidal nevus 4/11/2014     Constipation      Elevated prostate specific antigen (PSA) 2/20/2018     Family history of Parkinson's disease      GERD (gastroesophageal reflux disease)      Glaucoma 6/4/2015     Hearing loss      History of EMG 2/20/2018    [facility]  Nerve Conduction & EMG Report  Patient:       Eamon Whalen Patient ID:    [id number] Gender:        Male YOB: 1955 Age:           62 Years 2 Months    History & Examination:  62 year old man with history of Parkinson's disease, restless leg syndrome, and paresthesias in feet and legs. Query polyneuropathy. His clinical examination      Lumbago      Parkinson's disease (H)      Prolonged Q-T interval on ECG 3/23/2017     Urinary urgency      Wears glasses      Social History     Socioeconomic History     Marital status: Single     Spouse name: Not on file     Number of children: Not on file     Years of education: Not on file     Highest education level: Not on file   Occupational History     Occupation: farm     Employer: SELF EMPLOYED.   Social Needs     Financial resource strain: Not on file     Food insecurity     Worry: Not on file     Inability: Not on file     Transportation needs     Medical: Not on file     Non-medical: Not on file   Tobacco Use     Smoking status: Never Smoker     Smokeless tobacco: Never Used     Tobacco comment: cigar sometimes   Substance and Sexual Activity     Alcohol use: Yes     Comment: occ     Drug use: No     Sexual activity: Yes     Partners: Female   Lifestyle     Physical activity     Days per week: Not on file     Minutes per session: Not on file     Stress: Not on file   Relationships     Social connections     Talks on phone: Not on file     Gets together: Not on file     Attends Alevism service: Not on file     Active member of club or organization: Not on file     Attends meetings of clubs or  "organizations: Not on file     Relationship status: Not on file     Intimate partner violence     Fear of current or ex partner: Not on file     Emotionally abused: Not on file     Physically abused: Not on file     Forced sexual activity: Not on file   Other Topics Concern     Parent/sibling w/ CABG, MI or angioplasty before 65F 55M? Not Asked   Social History Narrative    1 daughter who is  and lives in [place]    She was pregnant with the 1st child and had some problems in the 6-7 month of pregnancy with     Baby that may have hydrocephalus as there was IUGR. The baby  after 3 months in 2011. She was encouraged her to undergo genetic testing which was negative and they are considering having more children.     He met a woman and may  a woman from [place] - who has [place] roots. 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 He met her 3 times. Her name is Tavia and lives in [facility]. They will be visiting within a day or so with her mother. She is 36 yrs old. She is not going back there.     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 Depending on the status of the main house may build new house.         [place] in his home town had parkinson    [place] lived [place] of [facility] -- [place].         This was way before pesticides        Maternal grandfather     Dax Sorenson - [place] name     He would have to be brought out to a rocking chair    \"hardening of there arteries.         mother     Family History   Problem Relation Age of Onset     Heart Disease Father      Dementia Mother      Other - See Comments Daughter         living in [facility]     Other - See Comments Brother      Other - See Comments Brother      Other - See Comments Sister      Other - See Comments Sister      Other - See Comments Sister      Parkinsonism " Paternal Uncle      Parkinsonism Other         paternal great grand mother     Other - See Comments Grandchild         mora's son     Other - See Comments Grandchild         mora's son     Current Outpatient Medications   Medication Sig Dispense Refill     aspirin 81 MG EC tablet Take 81 mg by mouth daily       carbidopa-levodopa ER (RYTARY) 61. MG CPCR per CR capsule 3 capsules by mouth 3/day @9am, 3pm and 9pm 270 capsule 11     Cholecalciferol (VITAMIN D3) 50 MCG (2000 UT) CAPS Take 1 capsule by mouth daily  3     finasteride (PROSCAR) 5 MG tablet 5mg tab by mouth daily @ 9am 30 tablet 3     Fluocinolone Acetonide Scalp 0.01 % OIL oil Wet hair and apply to scalp and leave in for 4 hours and then wash off. May use 1 times per week 1 Bottle 11     ketoconazole (NIZORAL) 2 % cream Apply topically daily Apply topical daily as needed 60 g 11     ketoconazole (NIZORAL) 2 % shampoo Apply to the affected area and wash off after 5 minutes. 120 mL 11     latanoprost (XALATAN) 0.005 % ophthalmic solution One drop in both eyes @ 9pm 2.5 mL 1     mirabegron (MYRBETRIQ) 25 MG 24 hr tablet Take 1 tablet (25 mg) by mouth daily 30 tablet 11     QUEtiapine (SEROQUEL) 25 MG tablet TAKE 1 TABLET BY MOUTH NIGHTLY 30 tablet 5     rOPINIRole (REQUIP ER) 2 MG 24 hr tablet TAKE 1 TABLET BY MOUTH EVERY NIGHT AT 9PM. 30 tablet 5     tadalafil (CIALIS) 20 MG tablet TAKE 1 TABLET BY MOUTH 1 HOUR BEFORE ACTIVITY AS NEEDED.  12     tamsulosin (FLOMAX) 0.4 MG capsule 0.4mg tab by mouth daily @ 9am 90 capsule 3     timolol (TIMOPTIC) 0.5 % ophthalmic solution 1 drop both eyes daily @ 9am 1 Bottle 11

## 2020-10-27 ENCOUNTER — VIRTUAL VISIT (OUTPATIENT)
Dept: NEUROLOGY | Facility: CLINIC | Age: 65
End: 2020-10-27
Payer: MEDICAID

## 2020-10-27 DIAGNOSIS — G20.A1 PARALYSIS AGITANS (H): Primary | ICD-10-CM

## 2020-10-27 DIAGNOSIS — F22 DELUSIONAL DISORDER (H): ICD-10-CM

## 2020-10-27 DIAGNOSIS — N31.9 NEUROGENIC BLADDER: ICD-10-CM

## 2020-10-27 PROBLEM — R44.1 VISUAL HALLUCINATIONS: Status: ACTIVE | Noted: 2019-07-23

## 2020-10-27 PROBLEM — R41.82 ALTERED MENTAL STATUS: Status: ACTIVE | Noted: 2019-07-23

## 2020-10-27 PROCEDURE — 99214 OFFICE O/P EST MOD 30 MIN: CPT | Mod: 95 | Performed by: PSYCHIATRY & NEUROLOGY

## 2020-10-27 RX ORDER — TIMOLOL MALEATE 5 MG/ML
SOLUTION/ DROPS OPHTHALMIC
COMMUNITY
Start: 2020-10-27

## 2020-10-27 RX ORDER — FLUTICASONE PROPIONATE 50 MCG
1 SPRAY, SUSPENSION (ML) NASAL DAILY PRN
COMMUNITY
Start: 2020-10-27 | End: 2021-01-29

## 2020-10-27 RX ORDER — MIRABEGRON 25 MG/1
TABLET, FILM COATED, EXTENDED RELEASE ORAL
Qty: 30 TABLET | Refills: 11 | COMMUNITY
Start: 2020-10-27 | End: 2021-01-29

## 2020-10-27 RX ORDER — QUETIAPINE FUMARATE 25 MG/1
TABLET, FILM COATED ORAL
Qty: 180 TABLET | Refills: 3 | COMMUNITY
Start: 2020-10-27 | End: 2020-11-03

## 2020-10-27 RX ORDER — QUETIAPINE FUMARATE 25 MG/1
TABLET, FILM COATED ORAL
Qty: 180 TABLET | Refills: 3 | Status: SHIPPED | OUTPATIENT
Start: 2020-10-27 | End: 2020-10-27

## 2020-10-27 RX ORDER — TAMSULOSIN HYDROCHLORIDE 0.4 MG/1
CAPSULE ORAL
Qty: 90 CAPSULE | Refills: 3 | COMMUNITY
Start: 2020-10-27 | End: 2021-03-23

## 2020-10-27 RX ORDER — QUETIAPINE FUMARATE 25 MG/1
TABLET, FILM COATED ORAL
COMMUNITY
Start: 2019-07-24 | End: 2020-10-27

## 2020-10-27 NOTE — PATIENT INSTRUCTIONS
Assessment:  (G20) Paralysis agitans (H)  (primary encounter diagnosis)  Carbidopa/levodopa rytary 245mg 3 tabs 3/day  Ropinirole requip ER 2mg 24 hr at night    Hallucinations/delusions  Quetiapine seroquel 25mg - only taking 1  8/12/2020 went to ED  2 weeks ago had hallucinations    Cognition  He has not been on rivastigmine    Bladder  Finasteride proscar 5mg  mirabegron myrbetriq 25mg  tamsulosin flomax 0.4mg       Medications     9am 3pm 9pm         Aspirin 81mg 1               Carbidopa-levodopa ER rytary 245mg 3 3 3         Cholecalciferol vitamin d3 50 mcg (2000 units)  1           Finasteride proscar 5mg 1           Fluticasone flonase 50 mcg/act nasal spray As needed       Latanoprost xalatan 0.005% opthalmic solution       Both eyes         Mirabegron myrbetriq 25mg 24 hr tablet             Quetiapine seroquel 25mg     1       Ropinirole requip XL 2mg TB24       1         Tamsulosin flomax 0.4mg       1         Timolol timoptic 0.5% ophthalmic solution Both eyes      just right eye                                                    Plan:    Increase seroquel from 25mg to 50mg at night and will need feedback in 1-2 weeks from Zully Cadena RN or/and sister Christopher Brown and brother Pee     MTM consultation with Sheree Neumann to connect with patient and Zully Cadena    May consider use of the rivastigmine patch depending on how things are going with the seroquel.     Needs ecg to followup on his QTc    Return to see me in 3 months.

## 2020-10-27 NOTE — LETTER
10/27/2020       RE: Eamon Whalen  [address]  [place] [address]     Dear Colleague,    Thank you for referring your patient, Eamon Whalen, to the [facility] NEUROLOGY CLINIC [place] [facility]. Please see a copy of my visit note below.        VIDEO VISIT - doximity.     Date of Visit: October 27, 2020  Name: Eamon Whalen  Date of Birth 1955  [address]   [place] [id number] [phone] (M)  [phone] (H)  [email or web address]  Has mychart  No clear daughter has set up mychart  Marlin Treviño daughter - not involved  [phone] mobile    younger sister in [place]  [phone]     Zully - visiting once per week to fill medications  [phone]    People involved in care.   Christopher Brown His sister - phone and Joe her  - [phone]  Pee Whalen - brother    Assessment:  (G20) Paralysis agitans (H)  (primary encounter diagnosis)  Carbidopa/levodopa rytary 245mg 3 tabs 3/day  Ropinirole requip ER 2mg 24 hr at night    Hallucinations/delusions  Quetiapine seroquel 25mg - only taking 1  8/12/2020 went to ED  2 weeks ago had hallucinations    Cognition  He has not been on rivastigmine    Bladder  Finasteride proscar 5mg  mirabegron myrbetriq 25mg  tamsulosin flomax 0.4mg       Medications     9am 3pm 9pm         Aspirin 81mg 1               Carbidopa-levodopa ER rytary 245mg 3 3 3         Cholecalciferol vitamin d3 50 mcg (2000 units)  1           Finasteride proscar 5mg 1           Fluticasone flonase 50 mcg/act nasal spray As needed       Latanoprost xalatan 0.005% opthalmic solution       Both eyes         Mirabegron myrbetriq 25mg 24 hr tablet             Quetiapine seroquel 25mg     1       Ropinirole requip XL 2mg TB24       1         Tamsulosin flomax 0.4mg       1         Timolol timoptic 0.5% ophthalmic solution Both eyes      just right eye                                                "     Plan:    Increase seroquel from 25mg to 50mg at night and will need feedback in 1-2 weeks from Zully Cadena RN or/and sister Christopher Brown and brother Pee GONZALEZ consultation with Sheree Thine to connect with patient and Zully Cadena    May consider use of the rivastigmine patch depending on how things are going with the seroquel.     Needs ecg to followup on his QTc    Return to see me in 3 months.    I have reviewed the note as documented above.  This accurately captures the substance of my conversation with the patient.    Patient contact time  25  minutes. Over 50% of this visit was spent in patient care and care coordination.   Time of visit 940am - 1010am    Edison Villasenor MD      ------------------------------------------------------------------------------------------------------------------------------------------------------------------------    Video-Visit Details    The patient has been notified of following:     \"After a review of the patient s situation, this visit was changed from an in-person visit to a video visit to reduce the risk of COVID-19 exposure.   The patient is being evaluated via a billable video visit.\"    \"This video visit will be conducted via a call between you and your physician/provider. We have found that certain health care needs can be provided without the need for an in-person physical exam.  This service lets us provide the care you need with a video conversation.  If a prescription is necessary we can send it directly to your pharmacy.  If lab work is needed we can place an order for that and you can then stop by our lab to have the test done at a later time.    If during the course of the call the physician/provider feels a video visit is not appropriate, you will not be charged for this service.\"     Patient has given verbal consent for Video visit? Yes    Patient would like the video invitation sent by:     Type of service:  Video Visit    Video Start Time:     Video End " Time (time video stopped):     Duration:  minutes - see above    Originating Location (pt. Location):     Distant Location (provider location):  [facility] NEUROLOGY [facility]INIC [place]     Mode of Communication:  Video Conference via [employer] (and if not possible then doximity)      Edison Villasenor MD      --------------------------------------------------------------------------------------------------------------    Eamon Whalen is a 64 year old male who is being evaluated via a billable video visit.      Charts reviewed  Consult from  Images reviewed        I have reviewed and updated the patient's Past Medical History, Social History, Family History and Medication List.    ALLERGIES  Cats and Dogs    Lasts visit details if there was a last visit:         Medications                                                                                                                                                                                                              14 Review of systems  are negative except for   Patient Active Problem List   Diagnosis     Paralysis agitans (H)     Wears glasses     Balance problems     Constipation     GERD (gastroesophageal reflux disease)     Urinary urgency     Insomnia     Seborrhea     Lumbago     Hearing loss     Family history of Parkinson's disease     Central retinal vein occlusion     Choroidal nevus     Vision problem -- Right Eye     ED (erectile dysfunction)     Other specified glaucoma     Abnormal electrocardiogram     Bifascicular block     Prolonged Q-T interval on ECG     History of EMG     Encounter for examination for driving license     Parkinson disease (H)     Elevated prostate specific antigen (PSA)     Family history of epilepsy and other diseases of the nervous system     Gastroesophageal reflux disease     Impotence of organic origin     Other abnormalities of gait and mobility     Personal history of other medical treatment (CODE)     Benign  neoplasm of choroid     Weakness        Allergies   Allergen Reactions     Cats      Dogs      Past Surgical History:   Procedure Laterality Date     NO HISTORY OF SURGERY       Past Medical History:   Diagnosis Date     Abnormal electrocardiogram 3/23/2017     Balance problems      Bifascicular block 3/23/2017     Central retinal vein occlusion 4/11/2014     Choroidal nevus 4/11/2014     Constipation      Elevated prostate specific antigen (PSA) 2/20/2018     Family history of Parkinson's disease      GERD (gastroesophageal reflux disease)      Glaucoma 6/4/2015     Hearing loss      History of EMG 2/20/2018    [facility]  Nerve Conduction & EMG Report  Patient:       Eamon Whalen Patient ID:    [id number] Gender:        Male YOB: 1955 Age:           62 Years 2 Months    History & Examination:  62 year old man with history of Parkinson's disease, restless leg syndrome, and paresthesias in feet and legs. Query polyneuropathy. His clinical examination      Lumbago      Parkinson's disease (H)      Prolonged Q-T interval on ECG 3/23/2017     Urinary urgency      Wears glasses      Social History     Socioeconomic History     Marital status: Single     Spouse name: Not on file     Number of children: Not on file     Years of education: Not on file     Highest education level: Not on file   Occupational History     Occupation: farm     Employer: SELF EMPLOYED.   Social Needs     Financial resource strain: Not on file     Food insecurity     Worry: Not on file     Inability: Not on file     Transportation needs     Medical: Not on file     Non-medical: Not on file   Tobacco Use     Smoking status: Never Smoker     Smokeless tobacco: Never Used     Tobacco comment: cigar sometimes   Substance and Sexual Activity     Alcohol use: Yes     Comment: occ     Drug use: No     Sexual activity: Yes     Partners: Female   Lifestyle     Physical activity     Days per week: Not  "on file     Minutes per session: Not on file     Stress: Not on file   Relationships     Social connections     Talks on phone: Not on file     Gets together: Not on file     Attends Sikh service: Not on file     Active member of club or organization: Not on file     Attends meetings of clubs or organizations: Not on file     Relationship status: Not on file     Intimate partner violence     Fear of current or ex partner: Not on file     Emotionally abused: Not on file     Physically abused: Not on file     Forced sexual activity: Not on file   Other Topics Concern     Parent/sibling w/ CABG, MI or angioplasty before 65F 55M? Not Asked   Social History Narrative    1 daughter who is  and lives in [place]    She was pregnant with the 1st child and had some problems in the 6-7 month of pregnancy with     Baby that may have hydrocephalus as there was IUGR. The baby  after 3 months in 2011. She was encouraged her to undergo genetic testing which was negative and they are considering having more children.     He met a woman and may  a woman from [place] - who has [place] roots. 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 He met her 3 times. Her name is Tavai and lives in [facility]. They will be visiting within a day or so with her mother. She is 36 yrs old. She is not going back there.     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 Depending on the status of the main house may build new house.         [place] in his home town had parkinson    [place] lived [place] [facility] [place].         This was way before pesticides        Maternal grandfather     Dax Sorenson - Andorran name     He would have to be brought out to a rocking chair    \"hardening of there arteries.         mother     Family History   Problem Relation Age of Onset "     Heart Disease Father      Dementia Mother      Other - See Comments Daughter         living in [facility]     Other - See Comments Brother      Other - See Comments Brother      Other - See Comments Sister      Other - See Comments Sister      Other - See Comments Sister      Parkinsonism Paternal Uncle      Parkinsonism Other         paternal great grand mother     Other - See Comments Grandchild         mora's son     Other - See Comments Grandchild         mora's son     Current Outpatient Medications   Medication Sig Dispense Refill     aspirin 81 MG EC tablet Take 81 mg by mouth daily       carbidopa-levodopa ER (RYTARY) 61. MG CPCR per CR capsule 3 capsules by mouth 3/day @9am, 3pm and 9pm 270 capsule 11     Cholecalciferol (VITAMIN D3) 50 MCG (2000 UT) CAPS Take 1 capsule by mouth daily  3     finasteride (PROSCAR) 5 MG tablet 5mg tab by mouth daily @ 9am 30 tablet 3     Fluocinolone Acetonide Scalp 0.01 % OIL oil Wet hair and apply to scalp and leave in for 4 hours and then wash off. May use 1 times per week 1 Bottle 11     ketoconazole (NIZORAL) 2 % cream Apply topically daily Apply topical daily as needed 60 g 11     ketoconazole (NIZORAL) 2 % shampoo Apply to the affected area and wash off after 5 minutes. 120 mL 11     latanoprost (XALATAN) 0.005 % ophthalmic solution One drop in both eyes @ 9pm 2.5 mL 1     mirabegron (MYRBETRIQ) 25 MG 24 hr tablet Take 1 tablet (25 mg) by mouth daily 30 tablet 11     QUEtiapine (SEROQUEL) 25 MG tablet TAKE 1 TABLET BY MOUTH NIGHTLY 30 tablet 5     rOPINIRole (REQUIP ER) 2 MG 24 hr tablet TAKE 1 TABLET BY MOUTH EVERY NIGHT AT 9PM. 30 tablet 5     tadalafil (CIALIS) 20 MG tablet TAKE 1 TABLET BY MOUTH 1 HOUR BEFORE ACTIVITY AS NEEDED.  12     tamsulosin (FLOMAX) 0.4 MG capsule 0.4mg tab by mouth daily @ 9am 90 capsule 3     timolol (TIMOPTIC) 0.5 % ophthalmic solution 1 drop both eyes daily @ 9am 1 Bottle 11                       Eamon Whalen is a 64  "year old male who is being evaluated via a billable video visit.      The patient has been notified of following:     \"This video visit will be conducted via a call between you and your physician/provider. We have found that certain health care needs can be provided without the need for an in-person physical exam.  This service lets us provide the care you need with a video conversation.  If a prescription is necessary we can send it directly to your pharmacy.  If lab work is needed we can place an order for that and you can then stop by our lab to have the test done at a later time.    Video visits are billed at different rates depending on your insurance coverage.  Please reach out to your insurance provider with any questions.    If during the course of the call the physician/provider feels a video visit is not appropriate, you will not be charged for this service.\"    Patient has given verbal consent for Video visit? yes  How would you like to obtain your AVS? mychart  If you are dropped from the video visit, the video invite should be resent to: cell  Will anyone else be joining your video visit? no        Video-Visit Details    Type of service:  Video Visit        Originating Location (pt. Location): Other home    Distant Location (provider location):  [facility] NEUROLOGY [facility]     Platform used for Video Visit: ANAHI Waterman          Again, thank you for allowing me to participate in the care of your patient.      Sincerely,    Edison Villasenor MD      "

## 2020-10-27 NOTE — PROGRESS NOTES
"Eamon Whalen is a 64 year old male who is being evaluated via a billable video visit.      The patient has been notified of following:     \"This video visit will be conducted via a call between you and your physician/provider. We have found that certain health care needs can be provided without the need for an in-person physical exam.  This service lets us provide the care you need with a video conversation.  If a prescription is necessary we can send it directly to your pharmacy.  If lab work is needed we can place an order for that and you can then stop by our lab to have the test done at a later time.    Video visits are billed at different rates depending on your insurance coverage.  Please reach out to your insurance provider with any questions.    If during the course of the call the physician/provider feels a video visit is not appropriate, you will not be charged for this service.\"    Patient has given verbal consent for Video visit? yes  How would you like to obtain your AVS? mychart  If you are dropped from the video visit, the video invite should be resent to: cell  Will anyone else be joining your video visit? no        Video-Visit Details    Type of service:  Video Visit        Originating Location (pt. Location): Other home    Distant Location (provider location):  [facility] NEUROLOGY [facility]     Platform used for Video Visit: susan Nelson EMT        "

## 2020-10-29 ENCOUNTER — TELEPHONE (OUTPATIENT)
Dept: NEUROLOGY | Facility: CLINIC | Age: 65
End: 2020-10-29

## 2020-10-29 NOTE — TELEPHONE ENCOUNTER
[facility] Call Center    Phone Message    May a detailed message be left on voicemail: yes     Reason for Call: Order(s): Other:   Reason for requested: ECG orders sent to [facility]   Date needed: asap  Provider name: Dr. Hamilton Perales is calling to help coordinate ECG and other appointments and needs the order to schedule.     Fax: [phone]  Ph: [phone]    Action Taken: Other:  NEUROLOGY    Travel Screening: Not Applicable

## 2020-11-03 ENCOUNTER — VIRTUAL VISIT (OUTPATIENT)
Dept: PHARMACY | Facility: CLINIC | Age: 65
End: 2020-11-03
Attending: PSYCHIATRY & NEUROLOGY
Payer: MEDICAID

## 2020-11-03 DIAGNOSIS — E55.9 VITAMIN D DEFICIENCY: ICD-10-CM

## 2020-11-03 DIAGNOSIS — H40.89 OTHER GLAUCOMA OF BOTH EYES: ICD-10-CM

## 2020-11-03 DIAGNOSIS — N40.0 BENIGN PROSTATIC HYPERPLASIA, UNSPECIFIED WHETHER LOWER URINARY TRACT SYMPTOMS PRESENT: ICD-10-CM

## 2020-11-03 DIAGNOSIS — Z79.82 ASPIRIN LONG-TERM USE: ICD-10-CM

## 2020-11-03 DIAGNOSIS — G20.A1 PARALYSIS AGITANS (H): Primary | ICD-10-CM

## 2020-11-03 DIAGNOSIS — F22 DELUSIONAL DISORDER (H): ICD-10-CM

## 2020-11-03 DIAGNOSIS — J30.2 SEASONAL ALLERGIC RHINITIS, UNSPECIFIED TRIGGER: ICD-10-CM

## 2020-11-03 DIAGNOSIS — R32 URINARY INCONTINENCE, UNSPECIFIED TYPE: ICD-10-CM

## 2020-11-03 PROCEDURE — 99607 MTMS BY PHARM ADDL 15 MIN: CPT | Mod: TEL | Performed by: PHARMACIST

## 2020-11-03 PROCEDURE — 99605 MTMS BY PHARM NP 15 MIN: CPT | Mod: TEL | Performed by: PHARMACIST

## 2020-11-03 RX ORDER — QUETIAPINE FUMARATE 50 MG/1
50 TABLET, FILM COATED ORAL AT BEDTIME
Qty: 90 TABLET | Refills: 3 | Status: SHIPPED | OUTPATIENT
Start: 2020-11-03 | End: 2021-06-25

## 2020-11-03 NOTE — PROGRESS NOTES
MTM ENCOUNTER  SUBJECTIVE/OBJECTIVE:                           Eamon Whalen is a 64 year old male called for a follow-up visit. He was referred to me from Dr. Villasenor. Patient was accompanied by home care nurse, Zully. Today's visit is a follow-up MTM visit from 4/30/20     Reason for visit: follow up on Parkinson's disease medications.    Allergies/ADRs: Reviewed in chart  Tobacco: He reports that he has never smoked. He has never used smokeless tobacco.  Alcohol: 1-3 beverages / week  Caffeine: 1 cups/day of coffee  Activity: not discussed today  Past Medical History: Reviewed in chart    Medication Adherence/Access: Zully RN from [facility] is setting up his pill boxes; however, she will be leaving and a new nurse Aielen will start filling his boxes starting next week (likely on Wednesdays). Zully she states that in reviewing the patient's pill boxes in the last week, it appears he missed 4 doses of Seroquel. Patient admits the Seroquel tablets are small and difficult for him to see/hold onto. [place] county [place] a different type of pill dispenser for him that might be easier for him to manage.    Parkinson's Disease/psychosis:  Current medications include: Carbidopa-levodopa (Rytary) 61. mg 3 capsules 3 times/day at 9 am, 3 pm, and 9 pm. He also takes ropinirole ER 2 mg daily and quetiapine 50 mg nightly (9 pm). Patient continues to see hallucinations; he saw 2 people and cats in his house today. Motor symptoms have been relatively stable. Zully states he seems to fall when he doesn't use his walker so they are working on having him use the walker more often. Today, patient states his biggest concern is that he is feeling dizzy at times and short of breath at times (both symptoms occur randomly and not simultaneously). Zully states his O2 sats have been good and blood pressure has been around 120s/60s typically though last week he had a reading of about 160/90. He is no longer having extremely low  blood pressures as he did in the past. Patient used an inhaler in the past but not recently and he has not had spirometry testing done. Patient admits that he thinks his shortness of breath is due to mold in his house which has since been cleared out.     Allergic Rhinitis: Current medications include fluticasone nasally. Patient feels that current therapy is somewhat effective. He was able to sleep better last night with fluticasone. He had been using albuterol in the past for allergies to cats but not recently (he no longer has a cat).     BPH/OAB: Currently taking tamsulosin 0.4 mg daily, finasteride 5 mg daily, and Myrbetriq 50 mg daily. No urinary concerns noted today and patient denies side effects of these medications.     Glaucoma: administering timolol eye drops in right eye twice daily and in left eye once daily, along with latanoprost in both eyes every night.     Vitamin D deficiency: Taking 2000 units of vitamin D daily. No lab results available.     CV prevention: Taking aspirin 81 mg daily. Denies side effects. No lipid results available. CV history not significantly documented in our medical record.     Today's Vitals: There were no vitals taken for this visit.    ASSESSMENT:                            Medication Adherence: FAYE Gallagher to call me next week to transition care from FAYE Andrea. [facility] may be offering the patient a different pill box.     Parkinson's Disease/psychosis:  Seems to be improving with more oversight of his medications. Will switch quetiapine from 25 mg x 2 to 50 mg 1 tablet nightly as it will be a slightly larger tablet.     Allergic Rhinitis: stable    BPH/OAB: stable    Glaucoma: unable to fully assess    Vitamin D deficiency: unable to assess labs but dose seems reasonable    CV prevention: unable to fully assess risk versus benefit of ongoing use of aspirin    PLAN:                            1. Switched quetiapine from 25 mg x 2 tablets to 50 mg x 1 tablet nightly.    2. Awaiting new home care nurse, FAYE Gallagher to call to transition care     I spent 24 minutes with this patient today. All changes were made via collaborative practice agreement with Dr. Villasenor. A copy of the visit note was provided to the patient's referring provider.    Will follow up in 2 weeks with FAYE Gallagher (she will call me).    The patient declined a summary of these recommendations.     Sheree Neumann, Pharm.D.  Medication Therapy Management Pharmacist  Phone: [phone]    Patient consented to a telehealth visit: yes  Telemedicine Visit Details  Type of service:  Telephone visit  Start Time: 10:33 AM  End Time: 10:57 AM  Originating Location (patient location): [place]  Distant Location (provider location):  [facility] NEUROLOGY CLINIC [facility]  Mode of Communication:  Telephone

## 2020-11-18 ENCOUNTER — TRANSFERRED RECORDS (OUTPATIENT)
Dept: HEALTH INFORMATION MANAGEMENT | Facility: CLINIC | Age: 65
End: 2020-11-18

## 2020-11-18 ENCOUNTER — DOCUMENTATION ONLY (OUTPATIENT)
Dept: NEUROLOGY | Facility: CLINIC | Age: 65
End: 2020-11-18

## 2020-11-18 ENCOUNTER — TELEPHONE (OUTPATIENT)
Dept: NEUROLOGY | Facility: CLINIC | Age: 65
End: 2020-11-18

## 2020-11-18 ENCOUNTER — TELEPHONE (OUTPATIENT)
Dept: PHARMACY | Facility: CLINIC | Age: 65
End: 2020-11-18

## 2020-11-18 NOTE — TELEPHONE ENCOUNTER
Received voicemail from patient's home care nurse, Aileen, with [facility]. Aileen is going to be his new home care nurse. She is calling to touch base with our clinic and get access to his [employer]. Phone number is [phone]    Sheree Neumann, Pharm.D.  Medication Therapy Management Pharmacist  Phone: [phone]

## 2020-11-19 NOTE — TELEPHONE ENCOUNTER
Received voicemail from Aileen stating that she will be at [facility] [place] next Wed, 11/25 around 10 am. I am not available at that time so I will schedule a MTM visit for 12/2/20 at 10 am instead. Called and informed FAYE Gallagher of this plan and scheduled MTM telephone visit for 12/2/20.    Sheree Neumann, Pharm.D.  Medication Therapy Management Pharmacist  Phone: [phone]

## 2020-11-19 NOTE — TELEPHONE ENCOUNTER
Called Susan to speak with Aileen about this patient. Left voicemail message recommending that we schedule a MTM visit for when she is planning to be at his home again to review medications and add her as MyChart proxy if he consents. Will await a call back from Aileen.    Sheree Neumann, Pharm.D.  Medication Therapy Management Pharmacist  Phone: [phone]

## 2020-11-21 DIAGNOSIS — G20.A1 PARALYSIS AGITANS (H): ICD-10-CM

## 2020-11-23 RX ORDER — ROPINIROLE 2 MG/1
TABLET, FILM COATED, EXTENDED RELEASE ORAL
Qty: 30 TABLET | Refills: 5 | Status: SHIPPED | OUTPATIENT
Start: 2020-11-23 | End: 2020-12-04

## 2020-11-23 NOTE — TELEPHONE ENCOUNTER
Rx Authorization:    Requested Medication/ DoserOPINIRole (REQUIP ER) 2 MG 24 hr tablet    Date last refill ordered: 5/4/20    Quantity ordered: 30    # refills: 5    Date of last clinic visit with ordering provider: 10/27/20    Date of next clinic visit with ordering provider: 1/28/21    All pertinent protocol data (lab date/result):     Include pertinent information from patients message:

## 2020-11-29 ENCOUNTER — HEALTH MAINTENANCE LETTER (OUTPATIENT)
Age: 65
End: 2020-11-29

## 2020-12-02 ENCOUNTER — VIRTUAL VISIT (OUTPATIENT)
Dept: PHARMACY | Facility: CLINIC | Age: 65
End: 2020-12-02
Payer: MEDICAID

## 2020-12-02 DIAGNOSIS — F22 DELUSIONAL DISORDER (H): ICD-10-CM

## 2020-12-02 DIAGNOSIS — G20.A1 PARALYSIS AGITANS (H): Primary | ICD-10-CM

## 2020-12-02 PROCEDURE — 99606 MTMS BY PHARM EST 15 MIN: CPT | Mod: TEL | Performed by: PHARMACIST

## 2020-12-02 PROCEDURE — 99607 MTMS BY PHARM ADDL 15 MIN: CPT | Mod: TEL | Performed by: PHARMACIST

## 2020-12-02 RX ORDER — IBUPROFEN 600 MG/1
600 TABLET, FILM COATED ORAL EVERY 6 HOURS PRN
COMMUNITY
End: 2021-06-25

## 2020-12-02 RX ORDER — KETOCONAZOLE 20 MG/ML
SHAMPOO TOPICAL DAILY PRN
COMMUNITY

## 2020-12-02 RX ORDER — SILDENAFIL CITRATE 20 MG/1
20 TABLET ORAL PRN
COMMUNITY
End: 2021-03-23

## 2020-12-02 RX ORDER — KETOCONAZOLE 20 MG/G
CREAM TOPICAL PRN
COMMUNITY

## 2020-12-02 RX ORDER — MOMETASONE FUROATE 1 MG/G
CREAM TOPICAL PRN
COMMUNITY
End: 2021-06-25

## 2020-12-02 NOTE — PROGRESS NOTES
"MT ENCOUNTER  SUBJECTIVE/OBJECTIVE:                           Eamon Whalen is a 65 year old male called for a follow-up visit. He was referred to me from Dr. Villasenor. Patient was accompanied by home care nurse, Aileen. Today's visit is a follow-up MT visit from 11/3/20     Reason for visit: follow up on Parkinson's disease.    Allergies/ADRs: Reviewed in chart  Tobacco: He reports that he has never smoked. He has never used smokeless tobacco.  Alcohol: 1-3 beverages / week  Caffeine: 1 cups/day of coffee  Past Medical History: Reviewed in chart  Social: Lives independently with home care nursing support; FAYE Gallagher requests to be added as proxy to the patient's MyChart and patient agrees but needs a form signed for this to take place    Medication Adherence/Access: Patient had been using a medication dispenser that resulted in him dropping a number of tablets on the floor (and thus not taking the medication), so he was switched to the \"flip dispenser\" with 2 different trays but unfortunately he is having trouble not getting alerted with the 9 pm doses of medication and has trouble getting the medication out of the dispenser. Patient states if he had twice daily doses of medications (9 am and 3 pm) he would do much better since he has staff with him at those times. It is apparently not an option for him to have support in the evenings. The county is involved and looking into other options. For the time being, he is back to using the old medication dispenser.     Parkinson's Disease/psychosis:  Current medications include: Carbidopa-levodopa (Rytary) 61. mg 3 capsules 3 times/day at 9 am, 3 pm, and 9 pm. He also takes ropinirole ER 2 mg daily (9 pm) and quetiapine 50 mg nightly (9 pm). Aides are reporting that he is still seeing hallucinations but patient expresses that he is not aware of the hallucinations. During the visit, he became distracted multiple times and asked about unrelated topics.     Today's " Vitals: There were no vitals taken for this visit.    ASSESSMENT:                            Medication Adherence: plan in place with [facility] - hoping to improve adherence with new medication dispenser.     Parkinson's Disease/psychosis: Patient still does not have insight into his hallucinations. It is unclear how often he is getting his doses of quetiapine as this seems to be the dose that he frequently misses. We will switch the ropinirole to the morning to at least improve the adherence of that medication and hoping the new pill dispenser helps. As of 11/18/20, the patient's QTc was 415 (normal).     PLAN:                            1. Will request that clinic navigators fax the [employer] proxy form to the patient to have him sign it and authorize FAYE Gallagher access to his MyChart.     2. Change ropinirole ER 2 mg to 9 am. Updated order e-prescribed to [facility] pharmacy and will request that clinic navigators fax updated order to [facility] at [phone] attn FAYE Gallagher     3. Future: may need to consider increase in quetiapine but for the time-being the priority is improved medication adherence.     I spent 46 minutes with this patient today. All changes were made via collaborative practice agreement with Dr. Villasenor. A copy of the visit note was provided to the patient's referring provider.    Will follow up in one month: 12/30/20    The patient was sent via [employer] a summary of these recommendations.     Sheree Neumann, Pharm.D.  Medication Therapy Management Pharmacist  Phone: [phone]    Patient consented to a telehealth visit: yes  Telemedicine Visit Details  Type of service:  Telephone visit  Start Time: 10:02 AM  End Time: 10:48 AM  Originating Location (patient location): Home  Distant Location (provider location):  [facility] NEUROLOGY [facility]INIC [facility]  Mode of Communication:  Telephone

## 2020-12-02 NOTE — Clinical Note
Med adherence is still an issue but [facility] and home care nursing is working with him

## 2020-12-04 ENCOUNTER — DOCUMENTATION ONLY (OUTPATIENT)
Dept: PHARMACY | Facility: CLINIC | Age: 65
End: 2020-12-04

## 2020-12-04 RX ORDER — ROPINIROLE 2 MG/1
2 TABLET, FILM COATED, EXTENDED RELEASE ORAL EVERY MORNING
Qty: 30 TABLET | Refills: 5 | Status: SHIPPED | OUTPATIENT
Start: 2020-12-04 | End: 2021-01-29

## 2020-12-04 NOTE — PROGRESS NOTES
Faxed  the ropinirole order to [facility] (FAYE Gallagher) at [phone] per Thien Rowell [facility]

## 2020-12-08 NOTE — PATIENT INSTRUCTIONS
Recommendations from today's MTM visit:                                                      1. Will request that clinic navigators fax the MyChart proxy form to the patient to have him sign it and authorize FAYE Gallagher access to his MyChart.      2. Change ropinirole ER 2 mg to 9 am. Updated order e-prescribed to [facility] pharmacy and will request that clinic navigators fax updated order to [facility] at [phone] attn FAYE Gallagher      3. Future: may need to consider increase in quetiapine but for the time-being the priority is improved medication adherence.     It was great to speak with you today.  I value your experience and would be very thankful for your time with providing feedback on our clinic survey. You may receive a survey via email or text message in the next few days.     Next MTM visit: 12/30/20 at 10 am -  I will call you and FAYE Gallagher    To schedule another MTM appointment, please call the clinic directly or you may call the MTM scheduling line at [phone] or toll-free at [phone].     My Clinical Pharmacist's contact information:                                                      It was a pleasure talking with you today!  Please feel free to contact me with any questions or concerns you have.      Sheree Neumann, Pharm.D.  Medication Therapy Management Pharmacist  Phone: [phone]

## 2020-12-08 NOTE — TELEPHONE ENCOUNTER
----- Message from Jolene Barnett RN sent at 12/8/2020 10:19 AM CST -----  Regarding: RE: Mychart proxy form  Hi Navigator team,    Please see Sheree's initial message below.    Thank you,  Jolene Barnett RN   ----- Message -----  From: Sheree Neumann [facility]  Sent: 12/7/2020   5:03 PM CST  To: Jolene Barnett RN  Subject: FW: Mychart proxy form                           Jolene - do you know if this was taken care of? I never received a message back from the navigators. Thanks!    Sheree Neumann, Pharm.D.  Medication Therapy Management Pharmacist  Phone: [phone]  ----- Message -----  From: Sheree Neumann [facility]  Sent: 12/4/2020   4:06 PM CST  To: Jolene Barnett RN, [facility]  Subject: Mychart proxy form                               Navigators - can you please mail the Mychart proxy form to this patient so he can fill it out and mail it back to us? I know FAYE Fernández has a copy of this form if you need to get it from her.     Thanks!  Sheree Neumann Pharm.D.  Medication Therapy Management Pharmacist  Phone: [phone]

## 2020-12-08 NOTE — TELEPHONE ENCOUNTER
----- Message from Jolene Barnett RN sent at 12/8/2020 10:19 AM CST -----  Regarding: RE: Mychart proxy form  Hi Navigator team,    Please see Sheree's initial message below.    Thank you,  Jolene Barnett RN   ----- Message -----  From: Sheree Neumann [facility]  Sent: 12/7/2020   5:03 PM CST  To: Jolene Barnett RN  Subject: FW: Mychart proxy form                           Jolene - do you know if this was taken care of? I never received a message back from the navigators. Thanks!    Sheree Neumann, Pharm.D.  Medication Therapy Management Pharmacist  Phone: [phone]  ----- Message -----  From: Sheree Neumann [facility]  Sent: 12/4/2020   4:06 PM CST  To: Jolene Barnett RN, [facility]  Subject: Mychart proxy form                               Navigators - can you please mail the Mychart proxy form to this patient so he can fill it out and mail it back to us? I know FAYE Fernández has a copy of this form if you need to get it from her.     Thanks!  Sheree Neumann Pharm.D.  Medication Therapy Management Pharmacist  Phone: [phone]

## 2020-12-30 ENCOUNTER — TELEPHONE (OUTPATIENT)
Dept: NEUROLOGY | Facility: CLINIC | Age: 65
End: 2020-12-30

## 2020-12-30 ENCOUNTER — VIRTUAL VISIT (OUTPATIENT)
Dept: PHARMACY | Facility: CLINIC | Age: 65
End: 2020-12-30
Payer: MEDICAID

## 2020-12-30 DIAGNOSIS — F22 DELUSIONAL DISORDER (H): ICD-10-CM

## 2020-12-30 DIAGNOSIS — G20.A1 PARALYSIS AGITANS (H): Primary | ICD-10-CM

## 2020-12-30 PROCEDURE — 99606 MTMS BY PHARM EST 15 MIN: CPT | Mod: TEL | Performed by: PHARMACIST

## 2020-12-30 NOTE — Clinical Note
DANI he seems stable. You are seeing him next month and I made a note in appt notes that you will send the video visit link to home care nurse via email for the appt.

## 2020-12-30 NOTE — PROGRESS NOTES
"Medication Therapy Management (MTM) Encounter    ASSESSMENT/PLAN:                            Medication Adherence/Access: adherence seems to be about the same and patient seems to be receiving as many services as he can to help with medication management. Medication regimen is as simplified as can be.    Parkinson's Disease/psychosis:  appears relatively stable. Encouraged good adherence to medication regimen. Informed patient that the \"Rytary 2.0\" has not been approved by FDA and thus is not commercially available.    PLAN:   1. Will have navigators fax MyChart proxy form to [facility] at [phone] for Eamon to sign to have FAYE Gallagher access his MyChart  2. Encouraged good adherence to medication regimen.   3. Next appointment will be with Dr. Villasenor; made note that he should send video visit link to FAYE Gallagher's email address.    Will follow up in 3-6 months or sooner if needed.    SUBJECTIVE/OBJECTIVE:                           Eamon Whalen is a 65 year old male called for a follow-up visit. He was referred to me from Dr. Villasenor. Patient was accompanied by home care nurse, Aileen MCKINNEY. Today's visit is a follow-up MTM visit from 12/2/20     Reason for visit: follow up on medications.    Allergies/ADRs: Reviewed in chart  Tobacco: He reports that he has never smoked. He has never used smokeless tobacco.  Alcohol: 1-3 beverages / week  Past Medical History: Reviewed in chart  Social: Lives independently with home care nursing support ([facility])    Medication Adherence/Access: FAYE Gallagher reports patient is still missing some medication doses (6 doses in the past week - all at different times of the day). Patient states the alarm is not very loud so he misses it sometimes. They are still using the medication dispenser which works relatively well for him (better than the flip dispenser). FAYE Gallagher states they are \"maxed out\" on services through the [facility]: aide coming 3 times/week, Aileen coming 1 time/week, " "[facility] rep coming 4-5 times/week. [facility] proxy form was not received by patient.    Parkinson's Disease/psychosis:  Current medications include: Carbidopa-levodopa (Rytary) 61. mg 3 capsules 3 times/day at 9 am, 3 pm, and 9 pm. He also takes ropinirole ER 2 mg daily (switched to 9 am) and quetiapine 50 mg nightly (9 pm). Aileen reports patient has been relatively stable. Still having some confusion off and on and hallucinations. Patient does not have any particular concerns at this time. He does ask about a \"new Rytary\" and whether he would be eligible.    Today's Vitals: There were no vitals taken for this visit.    I spent 18 minutes with this patient today. All changes were made via collaborative practice agreement with Dr. Villasenor. A copy of the visit note was provided to the patient's referring provider.    The patient declined a summary of these recommendations.     Sheree Neumann, Pharm.D.  Medication Therapy Management Pharmacist  Phone: [phone]    Patient consented to a telehealth visit: yes  Telemedicine Visit Details  Type of service:  Telephone visit  Start Time: 10:28 AM  End Time: 10:46 AM  Originating Location (patient location): [place]  Distant Location (provider location):  [facility] NEUROLOGY CLINIC [facility]  Mode of Communication:  Telephone      Medication Therapy Recommendations  No medication therapy recommendations to display    "

## 2020-12-30 NOTE — TELEPHONE ENCOUNTER
----- Message from Sheree Neumann RPH sent at 12/30/2020 12:20 PM CST -----  Regarding: RE: MyChart proxy form  Johnson Kebede,    I just emailed you the MyChart proxy form. Let me know if you don't receive it!    Sheree Neumann Pharm.D.  Medication Therapy Management Pharmacist  Phone: [phone]  ----- Message -----  From: Yandy Hicks [facility]  Sent: 12/30/2020  10:57 AM CST  To: Sheree Neumann RPH  Subject: RE: MyChart proxy form                           Were can the my chart Proxy form be found, I don't have it  ----- Message -----  From: Sheree Neumann RPH  Sent: 12/30/2020  10:49 AM CST  To: [facility]  Subject: MyChart proxy form                               Hello! Sounds like patient didn't receive (or may have lost) the MyChart proxy form that was mailed to him 12/4. Can you re-send the MyChart proxy form via fax this time to Peyton Gallagher RN at [facility]: [phone]?    Thanks!  Sheree Neumann Pharm.D.  Medication Therapy Management Pharmacist  Phone: [phone]

## 2021-01-13 NOTE — PROGRESS NOTES
VIDEO VISIT - unable to reach patient    Date of Visit: January 28, 2021  Name: Eamon Whalen  Date of Birth 1955  [address]   [place] [id number] [phone] (M)  [phone] (H)  [email or web address]  Has mychart  No clear daughter has set up mychart  Marlin Treviño daughter - not involved  [phone] mobile     younger sister in [place]  [phone]     Zully - visiting once per week to fill medications  [phone]     People involved in care.   Christopher Brown His sister - phone and Joe her  - [phone]  Pee Whalen - brother    [email or web address]    Assessment:  (G20) Parkinson disease (H)  (primary encounter diagnosis)  Carbidopa/levodopa rytary 245mg 3 tabs 3/day  Ropinirole requip ER 2mg 24 hr at night      Gait/Balance/Falls     Exercise/Therapy     Cognitive/Driving     Mood     Hallucinations/delusions *  Quetiapine seroquel 50mg    Sleep     Bladder   Finasteride proscar 5mg  mirabegron myrbetriq 25mg  tamsulosin flomax 0.4mg       GI/Constipation/GERD     ENDO   Cholecalciferol vitamin d3 50 mcg (2000 units)    Cardio/heart     Vision   Latanoprost xalatan 0.005% opthalmic solution  Timolol timoptic 0.5% ophthalmic solution    Heme   Aspirin 81mg    Other:  Fluticasone flonase 50 mcg/act nasal spray     Medications     9am 3pm 9pm         Aspirin 81mg 1               Carbidopa-levodopa ER rytary 245mg 3 3 3         Cholecalciferol vitamin d3 50 mcg (2000 units)  1           Finasteride proscar 5mg 1           Fluticasone flonase 50 mcg/act nasal spray As needed           Ibuprofen advil/motrin 600mg        Ketoconazole nizoral 2% ceam        Ketoconazole nizoral 2% shampoo        Latanoprost xalatan 0.005% opthalmic solution       Both eyes         Mirabegron myrbetriq 25mg 24 hr tablet             Mometasone elocon 0.1% cream        Quetiapine seroquel 50mg     1       Ropinirole requip XL 2mg TB24       1         Tamsulosin flomax 0.4mg  "       1         Timolol timoptic 0.5% ophthalmic solution Both eyes      just right eye                                                      Plan:      Medical Decision Making:  #  Chronic progressive medical conditions addressed  Review and/or interpretation of unique test or documentation from a provider outside of neurology    Independent historian provided additional details   I  Prescription drug management and review of potential side effects and/or monitoring for side effects     Health impacted by social determinants of health      I have reviewed the note as documented above.  This accurately captures the substance of my conversation with the patient and total time spent preparing for visit, executing visit and completing visit on the day of the visit:  000  minutes.     Edison Villasenor MD      ------------------------------------------------------------------------------------------------------------------------------------------------------------------------    Video-Visit Details    The patient has been notified of following:     \"After a review of the patient s situation, this visit was changed from an in-person visit to a video visit to reduce the risk of COVID-19 exposure.   The patient is being evaluated via a billable video visit.\"    \"This video visit will be conducted via a call between you and your physician/provider. We have found that certain health care needs can be provided without the need for an in-person physical exam.  This service lets us provide the care you need with a video conversation.  If a prescription is necessary we can send it directly to your pharmacy.  If lab work is needed we can place an order for that and you can then stop by our lab to have the test done at a later time.    If during the course of the call the physician/provider feels a video visit is not appropriate, you will not be charged for this service.\"     Patient has given verbal consent for Video visit? " Yes    Patient would like the video invitation sent by:     Type of service:  Video Visit    Video Start Time:     Video End Time (time video stopped):     Duration:  minutes - see above    Originating Location (pt. Location):     Distant Location (provider location):  [facility] [place]     Mode of Communication:  Video Conference via [employer] (and if not possible then doximity)      Edison Villasenor MD      --------------------------------------------------------------------------------------------------------------    Eamon Whalen is a 65 year old male who is being evaluated via a billable video visit.      Charts reviewed  Consult from  Images reviewed        I have reviewed and updated the patient's Past Medical History, Social History, Family History and Medication List.    ALLERGIES  Cats and Dogs    Lasts visit details if there was a last visit:       14 Review of systems  are negative except for   Patient Active Problem List   Diagnosis     Paralysis agitans (H)     Wears glasses     Balance problems     Constipation     GERD (gastroesophageal reflux disease)     Urinary urgency     Insomnia     Seborrhea     Lumbago     Hearing loss     Family history of Parkinson's disease     Central retinal vein occlusion     Choroidal nevus     Vision problem -- Right Eye     ED (erectile dysfunction)     Other specified glaucoma     Abnormal electrocardiogram     Bifascicular block     Prolonged Q-T interval on ECG     History of EMG     Encounter for examination for driving license     Parkinson disease (H)     Elevated prostate specific antigen (PSA)     Family history of epilepsy and other diseases of the nervous system     Gastroesophageal reflux disease     Impotence of organic origin     Other abnormalities of gait and mobility     Personal history of other medical treatment (CODE)     Benign neoplasm of choroid     Weakness     Altered mental status     Visual hallucinations      Personal history of other medical treatment        Allergies   Allergen Reactions     Cats      Dogs      Past Surgical History:   Procedure Laterality Date     NO HISTORY OF SURGERY       Past Medical History:   Diagnosis Date     Abnormal electrocardiogram 3/23/2017     Balance problems      Bifascicular block 3/23/2017     Central retinal vein occlusion 4/11/2014     Choroidal nevus 4/11/2014     Constipation      Elevated prostate specific antigen (PSA) 2/20/2018     Family history of Parkinson's disease      GERD (gastroesophageal reflux disease)      Glaucoma 6/4/2015     Hearing loss      History of EMG 2/20/2018    [facility]  Nerve Conduction & EMG Report  Patient:       Eamon Whalen Patient ID:    [id number] Gender:        Male YOB: 1955 Age:           62 Years 2 Months    History & Examination:  62 year old man with history of Parkinson's disease, restless leg syndrome, and paresthesias in feet and legs. Query polyneuropathy. His clinical examination      Lumbago      Parkinson's disease (H)      Prolonged Q-T interval on ECG 3/23/2017     Urinary urgency      Wears glasses      Social History     Socioeconomic History     Marital status: Single     Spouse name: Not on file     Number of children: Not on file     Years of education: Not on file     Highest education level: Not on file   Occupational History     Occupation: farm     Employer: SELF EMPLOYED.   Social Needs     Financial resource strain: Not on file     Food insecurity     Worry: Not on file     Inability: Not on file     Transportation needs     Medical: Not on file     Non-medical: Not on file   Tobacco Use     Smoking status: Never Smoker     Smokeless tobacco: Never Used     Tobacco comment: cigar sometimes   Substance and Sexual Activity     Alcohol use: Yes     Comment: occ     Drug use: No     Sexual activity: Yes     Partners: Female   Lifestyle     Physical activity     Days per  "week: Not on file     Minutes per session: Not on file     Stress: Not on file   Relationships     Social connections     Talks on phone: Not on file     Gets together: Not on file     Attends Religion service: Not on file     Active member of club or organization: Not on file     Attends meetings of clubs or organizations: Not on file     Relationship status: Not on file     Intimate partner violence     Fear of current or ex partner: Not on file     Emotionally abused: Not on file     Physically abused: Not on file     Forced sexual activity: Not on file   Other Topics Concern     Parent/sibling w/ CABG, MI or angioplasty before 65F 55M? Not Asked   Social History Narrative    1 daughter who is  and lives in [place]    She was pregnant with the 1st child and had some problems in the 6-7 month of pregnancy with     Baby that may have hydrocephalus as there was IUGR. The baby  after 3 months in 2011. She was encouraged her to undergo genetic testing which was negative and they are considering having more children.     He met a woman and may  a woman from [place] - who has [place] roots. 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 He met her 3 times. Her name is Tavia and lives in [facility]. They will be visiting within a day or so with her mother. She is 36 yrs old. She is not going back there.     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 Depending on the status of the main house may build new house.         [place] in his home town had parkinson    [place] lived [place] of [place].         This was way before pesticides        Maternal grandfather     Dax Sorenson - [place] name     He would have to be brought out to a rocking chair    \"hardening of there arteries.         mother     Family History   Problem Relation Age " of Onset     Heart Disease Father      Dementia Mother      Other - See Comments Daughter         living in [facility]     Other - See Comments Brother      Other - See Comments Brother      Other - See Comments Sister      Other - See Comments Sister      Other - See Comments Sister      Parkinsonism Paternal Uncle      Parkinsonism Other         paternal great grand mother     Other - See Comments Grandchild         mora's son     Other - See Comments Grandchild         mora's son     Current Outpatient Medications   Medication Sig Dispense Refill     aspirin 81 MG EC tablet 81mg tab by mouth daily @9am       carbidopa-levodopa ER (RYTARY) 61. MG CPCR per CR capsule 3 capsules by mouth 3/day @9am, 3pm and 9pm 270 capsule 11     cholecalciferol 50 MCG (2000 UT) CAPS 2000 units by mouth daily @9am       finasteride (PROSCAR) 5 MG tablet 5mg tab by mouth daily @ 9am 30 tablet 3     fluticasone (FLONASE) 50 MCG/ACT nasal spray Spray 1 spray into both nostrils daily as needed for rhinitis or allergies       ibuprofen (ADVIL/MOTRIN) 600 MG tablet Take 600 mg by mouth every 6 hours as needed for moderate pain       ketoconazole (NIZORAL) 2 % external cream Apply topically as needed for itching       ketoconazole (NIZORAL) 2 % external shampoo Apply topically daily as needed for itching or irritation       latanoprost (XALATAN) 0.005 % ophthalmic solution One drop in both eyes @ 9pm 2.5 mL 1     mirabegron (MYRBETRIQ) 25 MG 24 hr tablet 25mg tab by mouth every morning @9am 30 tablet 11     mometasone (ELOCON) 0.1 % external cream Apply topically as needed       QUEtiapine (SEROQUEL) 50 MG tablet Take 1 tablet (50 mg) by mouth At Bedtime (9pm) 90 tablet 3     rOPINIRole (REQUIP ER) 2 MG 24 hr tablet Take 1 tablet (2 mg) by mouth every morning (9 am) 30 tablet 5     sildenafil (REVATIO) 20 MG tablet Take 20 mg by mouth as needed       tamsulosin (FLOMAX) 0.4 MG capsule 0.4mg tab by mouth daily @ 9pm 90 capsule  3     timolol maleate (TIMOPTIC) 0.5 % ophthalmic solution 1 drop right eye twice daily @ 9am and 9pm and 1 drop left eye only in morning at 9am           Medications

## 2021-01-28 ENCOUNTER — VIRTUAL VISIT (OUTPATIENT)
Dept: NEUROLOGY | Facility: CLINIC | Age: 66
End: 2021-01-28
Payer: MEDICAID

## 2021-01-28 ENCOUNTER — MEDICAL CORRESPONDENCE (OUTPATIENT)
Dept: HEALTH INFORMATION MANAGEMENT | Facility: CLINIC | Age: 66
End: 2021-01-28

## 2021-01-28 DIAGNOSIS — G20.A1 PARKINSON DISEASE (H): Primary | ICD-10-CM

## 2021-01-28 PROCEDURE — 99207 PR NO CHARGE LOS: CPT | Performed by: PSYCHIATRY & NEUROLOGY

## 2021-01-29 DIAGNOSIS — G20.A1 PARALYSIS AGITANS (H): ICD-10-CM

## 2021-01-29 DIAGNOSIS — N31.9 NEUROGENIC BLADDER: ICD-10-CM

## 2021-01-29 RX ORDER — ROPINIROLE 2 MG/1
TABLET, FILM COATED, EXTENDED RELEASE ORAL
Qty: 30 TABLET | Refills: 5 | COMMUNITY
Start: 2021-01-29 | End: 2021-06-25

## 2021-01-29 RX ORDER — FLUTICASONE PROPIONATE 50 MCG
SPRAY, SUSPENSION (ML) NASAL
COMMUNITY
Start: 2021-01-29 | End: 2021-06-25

## 2021-01-29 RX ORDER — MIRABEGRON 25 MG/1
TABLET, FILM COATED, EXTENDED RELEASE ORAL
Qty: 30 TABLET | Refills: 11 | COMMUNITY
Start: 2021-01-29 | End: 2021-02-01

## 2021-01-29 RX ORDER — FINASTERIDE 5 MG/1
TABLET, FILM COATED ORAL
Qty: 30 TABLET | Refills: 3 | COMMUNITY
Start: 2021-01-29

## 2021-01-29 RX ORDER — QUETIAPINE FUMARATE 25 MG/1
TABLET, FILM COATED ORAL
COMMUNITY
Start: 2021-01-29 | End: 2021-02-09

## 2021-01-29 RX ORDER — ASPIRIN 81 MG/1
TABLET ORAL
COMMUNITY
Start: 2021-01-29 | End: 2021-06-25

## 2021-01-30 DIAGNOSIS — N31.9 NEUROGENIC BLADDER: ICD-10-CM

## 2021-02-01 ENCOUNTER — PATIENT OUTREACH (OUTPATIENT)
Dept: CARE COORDINATION | Facility: CLINIC | Age: 66
End: 2021-02-01

## 2021-02-01 ENCOUNTER — DOCUMENTATION ONLY (OUTPATIENT)
Dept: NEUROLOGY | Facility: CLINIC | Age: 66
End: 2021-02-01

## 2021-02-01 DIAGNOSIS — G20.A1 PARKINSON DISEASE (H): Primary | ICD-10-CM

## 2021-02-01 DIAGNOSIS — R41.89 COGNITIVE CHANGE: ICD-10-CM

## 2021-02-01 RX ORDER — MIRABEGRON 25 MG/1
TABLET, FILM COATED, EXTENDED RELEASE ORAL
Qty: 30 TABLET | Refills: 11 | Status: SHIPPED | OUTPATIENT
Start: 2021-02-01 | End: 2021-02-09

## 2021-02-01 NOTE — TELEPHONE ENCOUNTER
Rx Authorization:    Requested Medication/ Dose Myrbetriq Oral Tablet Extended Release 24 Hour 25 MG    Date last refill ordered: 1/29/2021    Quantity ordered: 30 tabs    # refills: 11    Date of last clinic visit with ordering provider: 1/28/2021    Date of next clinic visit with ordering provider: 2/9/2021    All pertinent protocol data (lab date/result):     Include pertinent information from patients message:

## 2021-02-01 NOTE — PROGRESS NOTES
Received medication list from [facility], form was signed by provide and fax to [phone], sent to be scanned

## 2021-02-01 NOTE — TELEPHONE ENCOUNTER
Myrbetriq was last prescribed on 1/31/2020 for a 30 day supply with 11 refills. A new RX is needed.

## 2021-02-02 NOTE — PROGRESS NOTES
VIDEO VISIT  Previously used [employer]  Zoom.  Computer and smart phone link  Connected using smart phone iphone - Jose Alfredo's number    Date of Visit: February 9, 2021  Name: Eamon Whalen  Date of Birth 1955  [address]   [place] [id number] [phone] (M)  [phone] (H)  [email or web address]  Has mychart  No clear daughter has set up mychart  Marlin Treviño daughter -   [phone] mobile     younger sister in [place]  [phone]     Zully - visiting once per week to fill medications  [phone]     People involved in care.   Christopher Brown His sister - phone and Joe her  - [phone]  Lives a few miles away. Someone is checking on him every day.   Pee Whalen - brother     [email or web address]  [phone]    [email or web address]   Trista phone number is [phone]    Assessment:  (G20) Parkinson disease (H)  (primary encounter diagnosis)  Carbidopa/levodopa rytary 245mg 3 tabs 3/day  Ropinirole requip ER 2mg 24 hr daily    Visit with Jose Alfredo Knutson and Christopher (sister) today  Marlin and Christopher are POA - medical  Brother driss is financial POA   from [employer] is trying to keep    Weekly nursing visit  Home health aide 2/week  PCA - not sure how frequently; not M-F  Not taking rivastigmine/exelon      Gait/Balance/Falls   He continues to be a fall risk  He has had a fall in the past week     Exercise/Therapy      Cognitive/Driving   Has confusion and dementia  Not on rivastigmine/exelon  He has missed doses of his medication -  Missed 3 doses last week  Not always using a walker.      Mood      Hallucinations/delusions *  Quetiapine seroquel 50mg  Thinking there are students there who are smoking marijuana cigarettes and sneaking in windows  Has some (possible delusions) about lingering problems from mold.      Sleep      Bladder   Nocturia 2-6/noc  Finasteride proscar 5mg  mirabegron myrbetriq 25mg  tamsulosin flomax  0.4mg     GI/Constipation/GERD      ENDO   Cholecalciferol vitamin d3 50 mcg (2000 units)      Cardio/heart 118/78; last week was 98/65  100% oxygen; pulse 73. 975 temperature fahrenheit  Had an episode of low blood pressure  Was slumped in a chair. May have been in the 70s  He will be seeing his pcp tomorrow.      Vision   Latanoprost xalatan 0.005% opthalmic solution  Timolol timoptic 0.5% ophthalmic solution     Heme   Aspirin 81mg     Other:  Fluticasone flonase 50 mcg/act nasal spray  Ibuprofen advil/motrin 600mg  Ketoconazole cream  Ketoconazole shampoo  Mometasone elocon       Medications     9am 3pm 9pm         Aspirin 81mg 1               Carbidopa-levodopa ER rytary 245mg 3 3 3         Cholecalciferol vitamin d3 50 mcg (2000 units)  1           Finasteride proscar 5mg 1           Fluticasone flonase 50 mcg/act nasal spray As needed           Ibuprofen advil/motrin 600mg             Ketoconazole nizoral 2% ceam             Ketoconazole nizoral 2% shampoo             Latanoprost xalatan 0.005% opthalmic solution       Both eyes         Mirabegron myrbetriq 25mg 24 hr tablet 1            Mometasone elocon 0.1% cream             Quetiapine seroquel 50mg     1       Ropinirole requip XL 2mg TB24 1               Tamsulosin flomax 0.4mg        1         Timolol timoptic 0.5% ophthalmic solution Both eyes      just right eye                                                      Plan:    Sister is granted medical decision making ability today as Eamon is not able to make decisions all the time and as discussed with Eamon, his sister and nurse that she should be making decisions by default taking in account his opinion as best can be done. For now he will remain in his home with services but this may change in the future    Continue on rytary 3 capsules 3/day    He will see his primary tomorrow to review his blood pressure and help determine if he needs a morning dose of proamatine/midodrine 2.5mg to increase his blood  "pressure. Not sure if his blood pressure has always been below 100 every morning. May need an order from Dr. John in regards to getting bp readings in his home.     At some point would like to try a cognitive enhancing medication like rivastigmine/exelon patch starting with a daily dose of 4.6mg for one month then 9.5mg daily dose - but this can reduce blood pressure and heart rate.     Sister granted proxy access to [employer] - set up for her a username and password.    Return back in one month.     Continues on quetiapine/seroquel 50mg     Discussed bladder/prostate medication - no change in regimen for now.   Finasteride proscar 5mg  mirabegron myrbetriq 25mg  tamsulosin flomax 0.4mg    Medical Decision Making:  #  Chronic progressive medical conditions addressed  Parkinson/hallucinations/psychosis/fall risk  Review and/or interpretation of unique test or documentation from a provider outside of neurology no   Independent historian provided additional details  Yes - family and nursing   Prescription drug management and review of potential side effects and/or monitoring for side effects  yes   Health impacted by social determinants of health  Yes - living situation    I have reviewed the note as documented above.  This accurately captures the substance of my conversation with the patient and total time spent preparing for visit, executing visit and completing visit on the day of the visit:  60 minutes.     Edison Villasenor MD      ------------------------------------------------------------------------------------------------------------------------------------------------------------------------    Video-Visit Details    The patient has been notified of following:     \"After a review of the patient s situation, this visit was changed from an in-person visit to a video visit to reduce the risk of COVID-19 exposure.   The patient is being evaluated via a billable video visit.\"    \"This video visit will be conducted via a " "call between you and your physician/provider. We have found that certain health care needs can be provided without the need for an in-person physical exam.  This service lets us provide the care you need with a video conversation.  If a prescription is necessary we can send it directly to your pharmacy.  If lab work is needed we can place an order for that and you can then stop by our lab to have the test done at a later time.    If during the course of the call the physician/provider feels a video visit is not appropriate, you will not be charged for this service.\"     Patient has given verbal consent for Video visit? Yes    Patient would like the video invitation sent by:     Type of service:  Video Visit    Video Start Time:     Video End Time (time video stopped):     Duration:  minutes - see above    Originating Location (pt. Location):     Distant Location (provider location):  [facility] [place]     Mode of Communication:  Video Conference via [employer] (and if not possible then doximity)      Edison Villasenor MD      --------------------------------------------------------------------------------------------------------------    Eamon Whalen is a 65 year old male who is being evaluated via a billable video visit.      Charts reviewed  Consult from  Images reviewed        I have reviewed and updated the patient's Past Medical History, Social History, Family History and Medication List.    ALLERGIES  Cats and Dogs    Lasts visit details if there was a last visit:       14 Review of systems  are negative except for   Patient Active Problem List   Diagnosis     Paralysis agitans (H)     Wears glasses     Balance problems     Constipation     GERD (gastroesophageal reflux disease)     Urinary urgency     Insomnia     Seborrhea     Lumbago     Hearing loss     Family history of Parkinson's disease     Central retinal vein occlusion     Choroidal nevus     Vision problem -- Right Eye     ED " (erectile dysfunction)     Other specified glaucoma     Abnormal electrocardiogram     Bifascicular block     Prolonged Q-T interval on ECG     History of EMG     Encounter for examination for driving license     Parkinson disease (H)     Elevated prostate specific antigen (PSA)     Family history of epilepsy and other diseases of the nervous system     Gastroesophageal reflux disease     Impotence of organic origin     Other abnormalities of gait and mobility     Personal history of other medical treatment (CODE)     Benign neoplasm of choroid     Weakness     Altered mental status     Visual hallucinations     Personal history of other medical treatment        Allergies   Allergen Reactions     Cats      Dogs      Past Surgical History:   Procedure Laterality Date     NO HISTORY OF SURGERY       Past Medical History:   Diagnosis Date     Abnormal electrocardiogram 3/23/2017     Balance problems      Bifascicular block 3/23/2017     Central retinal vein occlusion 4/11/2014     Choroidal nevus 4/11/2014     Constipation      Elevated prostate specific antigen (PSA) 2/20/2018     Family history of Parkinson's disease      GERD (gastroesophageal reflux disease)      Glaucoma 6/4/2015     Hearing loss      History of EMG 2/20/2018    [facility]  Nerve Conduction & EMG Report  Patient:       Eamon Whalen Patient ID:    [id number] Gender:        Male YOB: 1955 Age:           62 Years 2 Months    History & Examination:  62 year old man with history of Parkinson's disease, restless leg syndrome, and paresthesias in feet and legs. Query polyneuropathy. His clinical examination      Lumbago      Parkinson's disease (H)      Prolonged Q-T interval on ECG 3/23/2017     Urinary urgency      Wears glasses      Social History     Socioeconomic History     Marital status: Single     Spouse name: Not on file     Number of children: Not on file     Years of education: Not on  file     Highest education level: Not on file   Occupational History     Occupation: farm     Employer: SELF EMPLOYED.   Social Needs     Financial resource strain: Not on file     Food insecurity     Worry: Not on file     Inability: Not on file     Transportation needs     Medical: Not on file     Non-medical: Not on file   Tobacco Use     Smoking status: Never Smoker     Smokeless tobacco: Never Used     Tobacco comment: cigar sometimes   Substance and Sexual Activity     Alcohol use: Yes     Comment: occ     Drug use: No     Sexual activity: Yes     Partners: Female   Lifestyle     Physical activity     Days per week: Not on file     Minutes per session: Not on file     Stress: Not on file   Relationships     Social connections     Talks on phone: Not on file     Gets together: Not on file     Attends Latter-day service: Not on file     Active member of club or organization: Not on file     Attends meetings of clubs or organizations: Not on file     Relationship status: Not on file     Intimate partner violence     Fear of current or ex partner: Not on file     Emotionally abused: Not on file     Physically abused: Not on file     Forced sexual activity: Not on file   Other Topics Concern     Parent/sibling w/ CABG, MI or angioplasty before 65F 55M? Not Asked   Social History Narrative    1 daughter who is  and lives in [place]    She was pregnant with the 1st child and had some problems in the 6-7 month of pregnancy with     Baby that may have hydrocephalus as there was IUGR. The baby  after 3 months in 2011. She was encouraged her to undergo genetic testing which was negative and they are considering having more children.     He met a woman and may  a woman from [place] - who has [place] roots. 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 He met her 3 times. Her name is Tavia and lives in [place] [place]. They will be visiting within a day or so with her mother. She is 36 yrs old. She is not  "going back there.     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 Depending on the status of the main house may build new house.         [place] in his home town had parkinson    Germans lived on the [place] side of town        [facility] -- [place] of [place] 12 miles.         This was way before pesticides        Maternal grandfather     Dax Sorenson - [place] name     He would have to be brought out to a rocking chair    \"hardening of there arteries.         mother     Family History   Problem Relation Age of Onset     Heart Disease Father      Dementia Mother      Other - See Comments Daughter         living in [facility]     Other - See Comments Brother      Other - See Comments Brother      Other - See Comments Sister      Other - See Comments Sister      Other - See Comments Sister      Parkinsonism Paternal Uncle      Parkinsonism Other         paternal great grand mother     Other - See Comments Grandchild         mora's son     Other - See Comments Grandchild         mora's son     Current Outpatient Medications   Medication Sig Dispense Refill     aspirin 81 MG EC tablet 81mg tab by mouth daily @8/9am       carbidopa-levodopa ER (RYTARY) 61. MG CPCR per CR capsule 3 capsules by mouth 3/day @9am, 3pm and 9pm 270 capsule 11     cholecalciferol 50 MCG (2000 UT) CAPS 2000 units by mouth daily @8/9am       finasteride (PROSCAR) 5 MG tablet 5mg tab by mouth daily @8/9am 30 tablet 3     fluticasone (FLONASE) 50 MCG/ACT nasal spray Not clear if using 1 spray each nostril daily       ibuprofen (ADVIL/MOTRIN) 600 MG tablet Take 600 mg by mouth every 6 hours as needed for moderate pain       ketoconazole (NIZORAL) 2 % external cream Apply topically as needed for itching       ketoconazole (NIZORAL) 2 % external shampoo Apply topically daily as needed for itching or irritation       latanoprost " (XALATAN) 0.005 % ophthalmic solution One drop in both eyes @ 9pm 2.5 mL 1     mometasone (ELOCON) 0.1 % external cream Apply topically as needed       MYRBETRIQ 25 MG 24 hr tablet TAKE ONE TABLET BY MOUTH ONE TIME DAILY  30 tablet 11     QUEtiapine (SEROQUEL) 25 MG tablet 2 x 25mg tab by mouth every evening @ 9pm       QUEtiapine (SEROQUEL) 50 MG tablet Take 1 tablet (50 mg) by mouth At Bedtime (9pm) 90 tablet 3     rOPINIRole (REQUIP ER) 2 MG 24 hr tablet 2mg tab by mouth every morning @ 8/9 am 30 tablet 5     sildenafil (REVATIO) 20 MG tablet Take 20 mg by mouth as needed       tamsulosin (FLOMAX) 0.4 MG capsule 0.4mg tab by mouth daily @ 9pm 90 capsule 3     timolol maleate (TIMOPTIC) 0.5 % ophthalmic solution 1 drop right eye twice daily @ 9am and 9pm and 1 drop left eye only in morning at 9am           Medications

## 2021-02-04 ENCOUNTER — TELEPHONE (OUTPATIENT)
Dept: NEUROLOGY | Facility: CLINIC | Age: 66
End: 2021-02-04

## 2021-02-04 NOTE — TELEPHONE ENCOUNTER
Health Call Center    Phone Message    May a detailed message be left on voicemail: yes     Reason for Call: Other: [place] calling with [facility] - stating that patient missed 3 doses this past week. Wondering if Dr. Villasenor or nurse would like a update from her every week? States that since she has been with patient, patient has consistently missed doses.     Please advise and call Aileen back with any questions    Action Taken: Other: [facility]    Travel Screening: Not Applicable

## 2021-02-04 NOTE — PROGRESS NOTES
Social Work Intervention  [facility]    Data/Intervention:    Patient Name:  Eamon Whalen  /Age:  1955 (65 year old)    Visit Type: telephone  Referral Source: Jeaneth Abdullahi RN  Reason for Referral:  Home care nurse reporting concerns    Collaborated With:    -Aileen Licona home care nurse from [facility] [phone]  -Elsie Ko- [facility] adult protection  [phone]    Patient Concerns/Issues:   Pt's new home care nurse Aileen tried to help Pt with a video visit but was unsuccessful and thus spoke with neuro nurse Jeaneth Abdullahi to reschedule and report on his situation.  There has been on-going concerns with pt living alone and home care staff have reported to adult protection at various times.  Pt had OT cognitive testing summer of 2020 but we did not get the report. Reports were requested and they have been sent for scanning. Of note were MOCA score on 2020 was 13/30 and SLUMS 16/30. Updated testing has been requested to include CPT. The OT notes indicate variable performance day to day due to changes in cognition and hallucinations.   He has an adult protection  I spoke with, Elsie Ko. She is monitoring the situation as well as facilitating the creation of a special needs trust which she hopes will make it easier for him when he needs to leave his home/property that it will not be sold outright which is a huge fear of his. She indicated that he has services from the [facility] and a  as well as regular visits from his sis/bro so that he has daily visits. He wears a lifeline and has a medication machine.  She indicates that his team of caregivers, family, and herself email on a regular basis with updates. He has a health care directive which she will fax to me and his brother has POA of $. It has been his wish to remain in his home until death but his family recognize that may not be possible. Their goal is to keep  him there for as long as is safe enough but that line is not clearly established. Elsie indicates that she relies on the home care staff to let her know when they feel they can no longer safely care for him at home.     Intervention/Education/Resources Provided:  Requested OT cognitive assessments and will update records/team when those are received. Will also get his HCD into his record when that is received. As far as I know his directive is not yet activated.     Assessment/Plan:  It is helpful that he has CADI services and the watchful eye of an adult protection worker who also reports that his bro/sis are very involved. The expectation is that he would move into an [facility] or nursing home in the area when it's determined that a move is required.  Remain available       MARIA C Unger, [facility]    [facility] [place]  [phone]

## 2021-02-05 ENCOUNTER — DOCUMENTATION ONLY (OUTPATIENT)
Dept: OTHER | Facility: CLINIC | Age: 66
End: 2021-02-05

## 2021-02-05 NOTE — TELEPHONE ENCOUNTER
Left a message on [employer] secure mamadou informing her to notify his Adult Protection Worker: Elsie Maikel phone: [phone]    She can update us weekly or biweekly if she feels it is needed or if there are ever any concerns that we help address.

## 2021-02-09 ENCOUNTER — VIRTUAL VISIT (OUTPATIENT)
Dept: NEUROLOGY | Facility: CLINIC | Age: 66
End: 2021-02-09
Payer: MEDICAID

## 2021-02-09 DIAGNOSIS — G20.A1 PARKINSON DISEASE (H): Primary | ICD-10-CM

## 2021-02-09 PROCEDURE — 99215 OFFICE O/P EST HI 40 MIN: CPT | Mod: GT | Performed by: PSYCHIATRY & NEUROLOGY

## 2021-02-09 RX ORDER — MIRABEGRON 25 MG/1
25 TABLET, FILM COATED, EXTENDED RELEASE ORAL DAILY
Qty: 90 TABLET | Refills: 3 | COMMUNITY
Start: 2021-02-09 | End: 2021-06-25

## 2021-02-09 RX ORDER — MIRABEGRON 25 MG/1
25 TABLET, FILM COATED, EXTENDED RELEASE ORAL DAILY
COMMUNITY
End: 2021-02-09

## 2021-02-09 NOTE — PATIENT INSTRUCTIONS
Assessment:  (G20) Parkinson disease (H)  (primary encounter diagnosis)  Carbidopa/levodopa rytary 245mg 3 tabs 3/day  Ropinirole requip ER 2mg 24 hr daily    Visit with Aileen nKutson and Christopher (sister) today  Marlin and Christopher are POA - medical  Brother driss is financial POA   from [employer] is trying to keep    Weekly nursing visit  Home health aide 2/week  PCA - not sure how frequently; not M-F  Not taking rivastigmine/exelon      Gait/Balance/Falls   He continues to be a fall risk  He has had a fall in the past week     Exercise/Therapy      Cognitive/Driving   Has confusion and dementia  Not on rivastigmine/exelon  He has missed doses of his medication -  Missed 3 doses last week  Not always using a walker.      Mood      Hallucinations/delusions *  Quetiapine seroquel 50mg  Thinking there are students there who are smoking marijuana cigarettes and sneaking in windows  Has some (possible delusions) about lingering problems from mold.      Sleep      Bladder   Nocturia 2-6/noc  Finasteride proscar 5mg  mirabegron myrbetriq 25mg  tamsulosin flomax 0.4mg     GI/Constipation/GERD      ENDO   Cholecalciferol vitamin d3 50 mcg (2000 units)      Cardio/heart 118/78; last week was 98/65  100% oxygen; pulse 73. 975 temperature fahrenheit  Had an episode of low blood pressure  Was slumped in a chair. May have been in the 70s  He will be seeing his pcp tomorrow.      Vision   Latanoprost xalatan 0.005% opthalmic solution  Timolol timoptic 0.5% ophthalmic solution     Heme   Aspirin 81mg     Other:  Fluticasone flonase 50 mcg/act nasal spray  Ibuprofen advil/motrin 600mg  Ketoconazole cream  Ketoconazole shampoo  Mometasone elocon       Medications     9am 3pm 9pm         Aspirin 81mg 1               Carbidopa-levodopa ER rytary 245mg 3 3 3         Cholecalciferol vitamin d3 50 mcg (2000 units)  1           Finasteride proscar 5mg 1           Fluticasone flonase 50 mcg/act nasal spray As needed           Ibuprofen  advil/motrin 600mg             Ketoconazole nizoral 2% ceam             Ketoconazole nizoral 2% shampoo             Latanoprost xalatan 0.005% opthalmic solution       Both eyes         Mirabegron myrbetriq 25mg 24 hr tablet 1            Mometasone elocon 0.1% cream             Quetiapine seroquel 50mg     1       Ropinirole requip XL 2mg TB24 1               Tamsulosin flomax 0.4mg        1         Timolol timoptic 0.5% ophthalmic solution Both eyes      just right eye                                                      Plan:    Sister is granted medical decision making ability today as Eamon is not able to make decisions all the time and as discussed with Eamon, his sister and nurse that she should be making decisions by default taking in account his opinion as best can be done. For now he will remain in his home with services but this may change in the future    Continue on rytary 3 capsules 3/day    He will see his primary tomorrow to review his blood pressure and help determine if he needs a morning dose of proamatine/midodrine 2.5mg to increase his blood pressure. Not sure if his blood pressure has always been below 100 every morning. May need an order from Dr. John in regards to getting bp readings in his home.     At some point would like to try a cognitive enhancing medication like rivastigmine/exelon patch starting with a daily dose of 4.6mg for one month then 9.5mg daily dose - but this can reduce blood pressure and heart rate.     Sister granted proxy access to [employer] - set up for her a username and password.    Return back in one month.     Continues on quetiapine/seroquel 50mg     Discussed bladder/prostate medication - no change in regimen for now.   Finasteride proscar 5mg  mirabegron myrbetriq 25mg  tamsulosin flomax 0.4mg

## 2021-02-09 NOTE — LETTER
2/9/2021       RE: Eamon Whalen  [address]  [place] [address]     Dear Colleague,    Thank you for referring your patient, Eamon Whalen, to the [facility] [place] at [facility]. Please see a copy of my visit note below.    VIDEO VISIT  Previously used ADOR  Zoom.  Computer and smart phone link  Connected using smart phone iphone - Jose Alfredo's number    Date of Visit: February 9, 2021  Name: Eamon Whalen  Date of Birth 1955  [address]   [place] [id number] [phone] (M)  [phone] (H)  [email or web address]  Has mychart  No clear daughter has set up mychart  Marlin Treviño daughter -   [phone] mobile     younger sister in [place]  [phone]     Zully - visiting once per week to fill medications  [phone]     People involved in care.   Christopher Stephanie His sister - phone and Joe her  - [phone]  Lives a few miles away. Someone is checking on him every day.   Pee Whalen - brother     [email or web address]  [phone]    [email or web address]   Trista phone number is [phone]    Assessment:  (G20) Parkinson disease (H)  (primary encounter diagnosis)  Carbidopa/levodopa rytary 245mg 3 tabs 3/day  Ropinirole requip ER 2mg 24 hr daily    Visit with Jose Alfredo Knutson and Christopher (sister) today  Marlin and Christopher are POA - medical  Brother driss is financial POA   from [place] [employer] is trying to keep    Weekly nursing visit  Home health aide 2/week  PCA - not sure how frequently; not M-F  Not taking rivastigmine/exelon      Gait/Balance/Falls   He continues to be a fall risk  He has had a fall in the past week     Exercise/Therapy      Cognitive/Driving   Has confusion and dementia  Not on rivastigmine/exelon  He has missed doses of his medication -  Missed 3 doses last week  Not always using a walker.      Mood      Hallucinations/delusions  *  Quetiapine seroquel 50mg  Thinking there are students there who are smoking marijuana cigarettes and sneaking in windows  Has some (possible delusions) about lingering problems from mold.      Sleep      Bladder   Nocturia 2-6/noc  Finasteride proscar 5mg  mirabegron myrbetriq 25mg  tamsulosin flomax 0.4mg     GI/Constipation/GERD      ENDO   Cholecalciferol vitamin d3 50 mcg (2000 units)      Cardio/heart 118/78; last week was 98/65  100% oxygen; pulse 73. 975 temperature fahrenheit  Had an episode of low blood pressure  Was slumped in a chair. May have been in the 70s  He will be seeing his pcp tomorrow.      Vision   Latanoprost xalatan 0.005% opthalmic solution  Timolol timoptic 0.5% ophthalmic solution     Heme   Aspirin 81mg     Other:  Fluticasone flonase 50 mcg/act nasal spray  Ibuprofen advil/motrin 600mg  Ketoconazole cream  Ketoconazole shampoo  Mometasone elocon       Medications     9am 3pm 9pm         Aspirin 81mg 1               Carbidopa-levodopa ER rytary 245mg 3 3 3         Cholecalciferol vitamin d3 50 mcg (2000 units)  1           Finasteride proscar 5mg 1           Fluticasone flonase 50 mcg/act nasal spray As needed           Ibuprofen advil/motrin 600mg             Ketoconazole nizoral 2% ceam             Ketoconazole nizoral 2% shampoo             Latanoprost xalatan 0.005% opthalmic solution       Both eyes         Mirabegron myrbetriq 25mg 24 hr tablet 1            Mometasone elocon 0.1% cream             Quetiapine seroquel 50mg     1       Ropinirole requip XL 2mg TB24 1               Tamsulosin flomax 0.4mg        1         Timolol timoptic 0.5% ophthalmic solution Both eyes      just right eye                                                      Plan:    Sister is granted medical decision making ability today as Eamon is not able to make decisions all the time and as discussed with Eamon, his sister and nurse that she should be making decisions by default taking in account his opinion  as best can be done. For now he will remain in his home with services but this may change in the future    Continue on rytary 3 capsules 3/day    He will see his primary tomorrow to review his blood pressure and help determine if he needs a morning dose of proamatine/midodrine 2.5mg to increase his blood pressure. Not sure if his blood pressure has always been below 100 every morning. May need an order from Dr. John in regards to getting bp readings in his home.     At some point would like to try a cognitive enhancing medication like rivastigmine/exelon patch starting with a daily dose of 4.6mg for one month then 9.5mg daily dose - but this can reduce blood pressure and heart rate.     Sister granted proxy access to [employer] - set up for her a username and password.    Return back in one month.     Continues on quetiapine/seroquel 50mg     Discussed bladder/prostate medication - no change in regimen for now.   Finasteride proscar 5mg  mirabegron myrbetriq 25mg  tamsulosin flomax 0.4mg    Medical Decision Making:  #  Chronic progressive medical conditions addressed  Parkinson/hallucinations/psychosis/fall risk  Review and/or interpretation of unique test or documentation from a provider outside of neurology no   Independent historian provided additional details  Yes - family and nursing   Prescription drug management and review of potential side effects and/or monitoring for side effects  yes   Health impacted by social determinants of health  Yes - living situation    I have reviewed the note as documented above.  This accurately captures the substance of my conversation with the patient and total time spent preparing for visit, executing visit and completing visit on the day of the visit:  60 minutes.     Edison Villasenor MD      ------------------------------------------------------------------------------------------------------------------------------------------------------------------------    Video-Visit  "Details    The patient has been notified of following:     \"After a review of the patient s situation, this visit was changed from an in-person visit to a video visit to reduce the risk of COVID-19 exposure.   The patient is being evaluated via a billable video visit.\"    \"This video visit will be conducted via a call between you and your physician/provider. We have found that certain health care needs can be provided without the need for an in-person physical exam.  This service lets us provide the care you need with a video conversation.  If a prescription is necessary we can send it directly to your pharmacy.  If lab work is needed we can place an order for that and you can then stop by our lab to have the test done at a later time.    If during the course of the call the physician/provider feels a video visit is not appropriate, you will not be charged for this service.\"     Patient has given verbal consent for Video visit? Yes    Patient would like the video invitation sent by:     Type of service:  Video Visit    Video Start Time:     Video End Time (time video stopped):     Duration:  minutes - see above    Originating Location (pt. Location):     Distant Location (provider location):  [facility] [place]     Mode of Communication:  Video Conference via play140 (and if not possible then doximity)      Edison Villasenor MD      --------------------------------------------------------------------------------------------------------------    Eamon Whalen is a 65 year old male who is being evaluated via a billable video visit.      Charts reviewed  Consult from  Images reviewed        I have reviewed and updated the patient's Past Medical History, Social History, Family History and Medication List.    ALLERGIES  Cats and Dogs    Lasts visit details if there was a last visit:       14 Review of systems  are negative except for   Patient Active Problem List   Diagnosis     Paralysis agitans " (H)     Wears glasses     Balance problems     Constipation     GERD (gastroesophageal reflux disease)     Urinary urgency     Insomnia     Seborrhea     Lumbago     Hearing loss     Family history of Parkinson's disease     Central retinal vein occlusion     Choroidal nevus     Vision problem -- Right Eye     ED (erectile dysfunction)     Other specified glaucoma     Abnormal electrocardiogram     Bifascicular block     Prolonged Q-T interval on ECG     History of EMG     Encounter for examination for driving license     Parkinson disease (H)     Elevated prostate specific antigen (PSA)     Family history of epilepsy and other diseases of the nervous system     Gastroesophageal reflux disease     Impotence of organic origin     Other abnormalities of gait and mobility     Personal history of other medical treatment (CODE)     Benign neoplasm of choroid     Weakness     Altered mental status     Visual hallucinations     Personal history of other medical treatment        Allergies   Allergen Reactions     Cats      Dogs      Past Surgical History:   Procedure Laterality Date     NO HISTORY OF SURGERY       Past Medical History:   Diagnosis Date     Abnormal electrocardiogram 3/23/2017     Balance problems      Bifascicular block 3/23/2017     Central retinal vein occlusion 4/11/2014     Choroidal nevus 4/11/2014     Constipation      Elevated prostate specific antigen (PSA) 2/20/2018     Family history of Parkinson's disease      GERD (gastroesophageal reflux disease)      Glaucoma 6/4/2015     Hearing loss      History of EMG 2/20/2018    [facility]  Nerve Conduction & EMG Report  Patient:       Eamon Whalen Patient ID:    [id number] Gender:        Male YOB: 1955 Age:           62 Years 2 Months    History & Examination:  62 year old man with history of Parkinson's disease, restless leg syndrome, and paresthesias in feet and legs. Query polyneuropathy. His  clinical examination      Lumbago      Parkinson's disease (H)      Prolonged Q-T interval on ECG 3/23/2017     Urinary urgency      Wears glasses      Social History     Socioeconomic History     Marital status: Single     Spouse name: Not on file     Number of children: Not on file     Years of education: Not on file     Highest education level: Not on file   Occupational History     Occupation: farm     Employer: SELF EMPLOYED.   Social Needs     Financial resource strain: Not on file     Food insecurity     Worry: Not on file     Inability: Not on file     Transportation needs     Medical: Not on file     Non-medical: Not on file   Tobacco Use     Smoking status: Never Smoker     Smokeless tobacco: Never Used     Tobacco comment: cigar sometimes   Substance and Sexual Activity     Alcohol use: Yes     Comment: occ     Drug use: No     Sexual activity: Yes     Partners: Female   Lifestyle     Physical activity     Days per week: Not on file     Minutes per session: Not on file     Stress: Not on file   Relationships     Social connections     Talks on phone: Not on file     Gets together: Not on file     Attends Mandaen service: Not on file     Active member of club or organization: Not on file     Attends meetings of clubs or organizations: Not on file     Relationship status: Not on file     Intimate partner violence     Fear of current or ex partner: Not on file     Emotionally abused: Not on file     Physically abused: Not on file     Forced sexual activity: Not on file   Other Topics Concern     Parent/sibling w/ CABG, MI or angioplasty before 65F 55M? Not Asked   Social History Narrative    1 daughter who is  and lives in [place]    She was pregnant with the 1st child and had some problems in the 6-7 month of pregnancy with     Baby that may have hydrocephalus as there was IUGR. The baby  after 3 months in 2011. She was encouraged her to undergo genetic testing which was negative and they  "are considering having more children.     He met a woman and may  a woman from [place] - who has Ethiopian roots. 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 He met her 3 times. Her name is Tavia and lives in [facility]. They will be visiting within a day or so with her mother. She is 36 yrs old. She is not going back there.     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 Depending on the status of the main house may build new house.         [place] in his home town had parkinson    Germans lived on the other side of town        [facility] -- [place] of [place] 12 miles.         This was way before pesticides        Maternal grandfather     Dax Sorenson - Ivorian name     He would have to be brought out to a rocking chair    \"hardening of there arteries.         mother     Family History   Problem Relation Age of Onset     Heart Disease Father      Dementia Mother      Other - See Comments Daughter         living in [facility]     Other - See Comments Brother      Other - See Comments Brother      Other - See Comments Sister      Other - See Comments Sister      Other - See Comments Sister      Parkinsonism Paternal Uncle      Parkinsonism Other         paternal great grand mother     Other - See Comments Grandchild         mora's son     Other - See Comments Grandchild         mora's son     Current Outpatient Medications   Medication Sig Dispense Refill     aspirin 81 MG EC tablet 81mg tab by mouth daily @8/9am       carbidopa-levodopa ER (RYTARY) 61. MG CPCR per CR capsule 3 capsules by mouth 3/day @9am, 3pm and 9pm 270 capsule 11     cholecalciferol 50 MCG (2000 UT) CAPS 2000 units by mouth daily @8/9am       finasteride (PROSCAR) 5 MG tablet 5mg tab by mouth daily @8/9am 30 tablet 3     fluticasone (FLONASE) 50 MCG/ACT nasal spray Not clear if using 1 spray each nostril daily       " ibuprofen (ADVIL/MOTRIN) 600 MG tablet Take 600 mg by mouth every 6 hours as needed for moderate pain       ketoconazole (NIZORAL) 2 % external cream Apply topically as needed for itching       ketoconazole (NIZORAL) 2 % external shampoo Apply topically daily as needed for itching or irritation       latanoprost (XALATAN) 0.005 % ophthalmic solution One drop in both eyes @ 9pm 2.5 mL 1     mometasone (ELOCON) 0.1 % external cream Apply topically as needed       MYRBETRIQ 25 MG 24 hr tablet TAKE ONE TABLET BY MOUTH ONE TIME DAILY  30 tablet 11     QUEtiapine (SEROQUEL) 25 MG tablet 2 x 25mg tab by mouth every evening @ 9pm       QUEtiapine (SEROQUEL) 50 MG tablet Take 1 tablet (50 mg) by mouth At Bedtime (9pm) 90 tablet 3     rOPINIRole (REQUIP ER) 2 MG 24 hr tablet 2mg tab by mouth every morning @ 8/9 am 30 tablet 5     sildenafil (REVATIO) 20 MG tablet Take 20 mg by mouth as needed       tamsulosin (FLOMAX) 0.4 MG capsule 0.4mg tab by mouth daily @ 9pm 90 capsule 3     timolol maleate (TIMOPTIC) 0.5 % ophthalmic solution 1 drop right eye twice daily @ 9am and 9pm and 1 drop left eye only in morning at 9am           Medications                                                                                                                                                                                                                    Eamon is a 65 year old who is being evaluated via a billable video visit.      How would you like to obtain your AVS? Mychart  If the video visit is dropped, the invitation should be resent by: [email or web address]   Trista phone number is [phone]      Video Start Time:   Video-Visit Details    Type of service:  Video Visit    Video End Time:    Originating Location (pt. Location): Home    Distant Location (provider location):  [facility]     Platform used for Video Visit: Luis Angel        Again, thank you for allowing me to  participate in the care of your patient.      Sincerely,    Edison Villasenor MD

## 2021-02-09 NOTE — PROGRESS NOTES
Eamon is a 65 year old who is being evaluated via a billable video visit.      How would you like to obtain your AVS? Mychart  If the video visit is dropped, the invitation should be resent by: [email or web address]   Trista phone number is [phone]      Video Start Time:   Video-Visit Details    Type of service:  Video Visit    Video End Time:    Originating Location (pt. Location): Home    Distant Location (provider location):  [facility] [place]     Platform used for Video Visit: [employer]

## 2021-02-17 ENCOUNTER — MEDICAL CORRESPONDENCE (OUTPATIENT)
Dept: HEALTH INFORMATION MANAGEMENT | Facility: CLINIC | Age: 66
End: 2021-02-17

## 2021-02-18 ENCOUNTER — TELEPHONE (OUTPATIENT)
Dept: NEUROLOGY | Facility: CLINIC | Age: 66
End: 2021-02-18

## 2021-02-18 NOTE — TELEPHONE ENCOUNTER
Received records from [facility] health care   Records Date of service: 2/11/21  Copy has been sent to scanning and encounter routed to specialty nurse for review.

## 2021-03-15 NOTE — PROGRESS NOTES
VIDEO VISIT - using [employer] to connect via her phone number     Date of Visit: March 23, 2021  Name: Eamon Whalen  Date of Birth 1955  [address]   [place] [id number] [phone] (M)  [phone] (H)  [email or web address]  Has mychart  No clear daughter has set up mychart  Marlin Treviño daughter - alejandro username 1982  [phone] mobile    Christopher Mckeon sister     younger sister in [place]  [phone]     Zully - visiting once per week to fill medications  [phone]     People involved in care.   Christopher Mckeon His sister - phone and Joe her  - [phone]  Lives a few miles away. Someone is checking on him every day.   Pee Whalen - brother     [email or web address]  [phone]     [email or web address] phone number is [phone]    Sister is granted medical decision making ability today as Eamon is not able to make decisions all the time and as discussed with Eamon, his sister and nurse that she should be making decisions by default taking in account his opinion as best can be done. For now he will remain in his home with services but this may change in the future     Continue on rytary 3 capsules 3/day     He will see his primary tomorrow to review his blood pressure and help determine if he needs a morning dose of proamatine/midodrine 2.5mg to increase his blood pressure. Not sure if his blood pressure has always been below 100 every morning. May need an order from Dr. John in regards to getting bp readings in his home.      At some point would like to try a cognitive enhancing medication like rivastigmine/exelon patch starting with a daily dose of 4.6mg for one month then 9.5mg daily dose - but this can reduce blood pressure and heart rate.     Assessment:  (G20) Parkinson disease (H)  (primary encounter diagnosis)  Carbidopa/levodopa rytary 245mg 3 tabs 3/day  Ropinirole requip ER 2mg 24 hr daily     Visit  with Aileen Knutson and Christopher (sister) today  Marlin and Christopher are POA - medical  Brother driss is financial POA   from [email or web address] is trying to keep     Weekly nursing visit  Home health aide 2/week  PCA - not sure how frequently; not M-F  Not taking rivastigmine/exelon      Gait/Balance/Falls   He continues to be a fall risk  He has had a fall in the past week     Exercise/Therapy      Cognitive/Driving   Has confusion and dementia  Not on rivastigmine/exelon  He has missed doses of his medication -  Missed 3 doses last week  Not always using a walker.      Mood      Hallucinations/delusions *  Quetiapine seroquel 50mg at 9pm  Thinking there are students there who are smoking marijuana cigarettes and sneaking in windows  Has some (possible delusions) about lingering problems from mold.      Sleep      Bladder   Nocturia 2-6/noc  Finasteride proscar 5mg daily   mirabegron myrbetriq 25mg daily  tamsulosin flomax 0.4mg - this was stopped     GI/Constipation/GERD   Fleet enema to help his colonoscopy on 3/31/2021     ENDO   Cholecalciferol vitamin d3 125 mcg (5000 units) daily     Cardio/heart 118/78; last week was 98/65  100% oxygen; pulse 73. 975 temperature fahrenheit  Had an episode of low blood pressure  Was slumped in a chair. May have been in the 70s  Had seen his pcp and went off tamsulosin  113/80 today and has been 130s      Vision   Latanoprost xalatan 0.005% opthalmic solution both eyes at night  Timolol timoptic 0.5% ophthalmic solution 1 drop right eye twice daily and 1 drop left eye in morning     Heme   Aspirin 81mg daily - will be on hold for a week - he is getting colonoscopy on 3/31/2021     Other:  Fluticasone flonase 50 mcg/act nasal spray - not using but has it as needed    Ibuprofen advil/motrin 600mg as needed    Ketoconazole cream nizoral 2% cream -using   Ketoconazole shampoo 2%  - using  Mometasone elocon 0.1% cream    cipro prior to procedure        Medications     9am 3pm 9pm          Aspirin 81mg 1               Carbidopa-levodopa ER rytary 245mg 3 3 3         Cholecalciferol vitamin d3 125 mcg (5000 units)  1           Finasteride proscar 5mg 1           Fluticasone flonase 50 mcg/act nasal spray Not using As needed         Ibuprofen advil/motrin 600mg  as needed           Ketoconazole nizoral 2% ceam             Ketoconazole nizoral 2% shampoo             Latanoprost xalatan 0.005% opthalmic solution       Both eyes         Mirabegron myrbetriq 25mg 24 hr tablet 1            Mometasone elocon 0.1% cream             Quetiapine seroquel 50mg     1       Ropinirole requip XL 2mg TB24 1                Tamsulosin flomax 0.4mg  stopped      1         Timolol timoptic 0.5% ophthalmic solution Both eyes      just right eye                                                    Plan:    After colonoscopy would have him start on the rivastigmine patch    Start 1st April using 4.6mg patch daily for one month then increase to 9.5mg patch daily    Will need someone to put on the patch and move the site daily  See this website for more information about applying the patch and monitoring for bladder,  Heart rate and blood pressure issues as well as a rash.     [email or web address]    He granted proxy access to his daughter and relaying information on her username to her.     Medical Decision Making:  #  Chronic progressive medical conditions addressed  Yes - cognitive and parkinson and gi  Review and/or interpretation of unique test or documentation from a provider outside of neurology no   Independent historian provided additional details  Yes - jose alfredo   Prescription drug management and review of potential side effects and/or monitoring for side effects  yes   Health impacted by social determinants of health  Yes - cognitive issues    I have reviewed the note as documented above.  This accurately  "captures the substance of my conversation with the patient and total time spent preparing for visit, executing visit and completing visit on the day of the visit:  30 minutes.     Edison Villasenor MD      ------------------------------------------------------------------------------------------------------------------------------------------------------------------------    Video-Visit Details    The patient has been notified of following:     \"After a review of the patient s situation, this visit was changed from an in-person visit to a video visit to reduce the risk of COVID-19 exposure.   The patient is being evaluated via a billable video visit.\"    \"This video visit will be conducted via a call between you and your physician/provider. We have found that certain health care needs can be provided without the need for an in-person physical exam.  This service lets us provide the care you need with a video conversation.  If a prescription is necessary we can send it directly to your pharmacy.  If lab work is needed we can place an order for that and you can then stop by our lab to have the test done at a later time.    If during the course of the call the physician/provider feels a video visit is not appropriate, you will not be charged for this service.\"     Patient has given verbal consent for Video visit? Yes    Patient would like the video invitation sent by:     Type of service:  Video Visit    Video Start Time:     Video End Time (time video stopped):     Duration:  minutes - see above    Originating Location (pt. Location):     Distant Location (provider location):  [facility]     Mode of Communication:  Video Conference via [employer] (and if not possible then doximity)      Edison Villasenor MD      --------------------------------------------------------------------------------------------------------------    Eamondimitris Whalen is a 65 year old male who is being evaluated via a " billable video visit.      Charts reviewed  Consult from  Images reviewed        I have reviewed and updated the patient's Past Medical History, Social History, Family History and Medication List.    ALLERGIES  Cats and Dogs    Lasts visit details if there was a last visit:       14 Review of systems  are negative except for   Patient Active Problem List   Diagnosis     Paralysis agitans (H)     Wears glasses     Balance problems     Constipation     GERD (gastroesophageal reflux disease)     Urinary urgency     Insomnia     Seborrhea     Lumbago     Hearing loss     Family history of Parkinson's disease     Central retinal vein occlusion     Choroidal nevus     Vision problem -- Right Eye     ED (erectile dysfunction)     Other specified glaucoma     Abnormal electrocardiogram     Bifascicular block     Prolonged Q-T interval on ECG     History of EMG     Encounter for examination for driving license     Parkinson disease (H)     Elevated prostate specific antigen (PSA)     Family history of epilepsy and other diseases of the nervous system     Gastroesophageal reflux disease     Impotence of organic origin     Other abnormalities of gait and mobility     Personal history of other medical treatment (CODE)     Benign neoplasm of choroid     Weakness     Altered mental status     Visual hallucinations     Personal history of other medical treatment        Allergies   Allergen Reactions     Cats      Dogs      Past Surgical History:   Procedure Laterality Date     NO HISTORY OF SURGERY       Past Medical History:   Diagnosis Date     Abnormal electrocardiogram 3/23/2017     Balance problems      Bifascicular block 3/23/2017     Central retinal vein occlusion 4/11/2014     Choroidal nevus 4/11/2014     Constipation      Elevated prostate specific antigen (PSA) 2/20/2018     Family history of Parkinson's disease      GERD (gastroesophageal reflux disease)      Glaucoma 6/4/2015     Hearing loss      History of EMG  2/20/2018    [facility]  Nerve Conduction & EMG Report  Patient:       Eamon Whalen Patient ID:    [id number] Gender:        Male YOB: 1955 Age:           62 Years 2 Months    History & Examination:  62 year old man with history of Parkinson's disease, restless leg syndrome, and paresthesias in feet and legs. Query polyneuropathy. His clinical examination      Lumbago      Parkinson's disease (H)      Prolonged Q-T interval on ECG 3/23/2017     Urinary urgency      Wears glasses      Social History     Socioeconomic History     Marital status: Single     Spouse name: Not on file     Number of children: Not on file     Years of education: Not on file     Highest education level: Not on file   Occupational History     Occupation: farm     Employer: SELF EMPLOYED.   Social Needs     Financial resource strain: Not on file     Food insecurity     Worry: Not on file     Inability: Not on file     Transportation needs     Medical: Not on file     Non-medical: Not on file   Tobacco Use     Smoking status: Never Smoker     Smokeless tobacco: Never Used     Tobacco comment: cigar sometimes   Substance and Sexual Activity     Alcohol use: Yes     Comment: occ     Drug use: No     Sexual activity: Yes     Partners: Female   Lifestyle     Physical activity     Days per week: Not on file     Minutes per session: Not on file     Stress: Not on file   Relationships     Social connections     Talks on phone: Not on file     Gets together: Not on file     Attends Jainism service: Not on file     Active member of club or organization: Not on file     Attends meetings of clubs or organizations: Not on file     Relationship status: Not on file     Intimate partner violence     Fear of current or ex partner: Not on file     Emotionally abused: Not on file     Physically abused: Not on file     Forced sexual activity: Not on file   Other Topics Concern     Parent/sibling w/ CABG, MI  "or angioplasty before 65F 55M? Not Asked   Social History Narrative    1 daughter who is  and lives in [place]    She was pregnant with the 1st child and had some problems in the 6-7 month of pregnancy with     Baby that may have hydrocephalus as there was IUGR. The baby  after 3 months in 2011. She was encouraged her to undergo genetic testing which was negative and they are considering having more children.     He met a woman and may  a woman from [place] - who has [place] roots. 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 He met her 3 times. Her name is Tavia and lives in [facility]. They will be visiting within a day or so with her mother. She is 36 yrs old. She is not going back there.     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 Depending on the status of the main house may build new house.         cheryle in his home town had parkinson    [place] lived [place] [facility] [place].         This was way before pesticides        Maternal grandfather     Dax Sorenson - [place] Islander name     He would have to be brought out to a rocking chair    \"hardening of there arteries.         mother     Family History   Problem Relation Age of Onset     Heart Disease Father      Dementia Mother      Other - See Comments Daughter         living in [facility]     Other - See Comments Brother      Other - See Comments Brother      Other - See Comments Sister      Other - See Comments Sister      Other - See Comments Sister      Parkinsonism Paternal Uncle      Parkinsonism Other         paternal great grand mother     Other - See Comments Grandchild         mora's son     Other - See Comments Grandchild         mora's son     Current Outpatient Medications   Medication Sig Dispense Refill     aspirin 81 MG EC tablet 81mg tab by mouth daily " @8/9am       carbidopa-levodopa ER (RYTARY) 61. MG CPCR per CR capsule 3 capsules by mouth 3/day @9am, 3pm and 9pm 270 capsule 11     cholecalciferol 50 MCG (2000 UT) CAPS 2000 units by mouth daily @8/9am       finasteride (PROSCAR) 5 MG tablet 5mg tab by mouth daily @8/9am 30 tablet 3     fluticasone (FLONASE) 50 MCG/ACT nasal spray Not clear if using 1 spray each nostril daily       ibuprofen (ADVIL/MOTRIN) 600 MG tablet Take 600 mg by mouth every 6 hours as needed for moderate pain       ketoconazole (NIZORAL) 2 % external cream Apply topically as needed for itching       ketoconazole (NIZORAL) 2 % external shampoo Apply topically daily as needed for itching or irritation       latanoprost (XALATAN) 0.005 % ophthalmic solution One drop in both eyes @ 9pm 2.5 mL 1     mirabegron (MYRBETRIQ) 25 MG 24 hr tablet Take 1 tablet (25 mg) by mouth daily 90 tablet 3     mometasone (ELOCON) 0.1 % external cream Apply topically as needed       QUEtiapine (SEROQUEL) 50 MG tablet Take 1 tablet (50 mg) by mouth At Bedtime (9pm) 90 tablet 3     rOPINIRole (REQUIP ER) 2 MG 24 hr tablet 2mg tab by mouth every morning @ 8/9 am 30 tablet 5     sildenafil (REVATIO) 20 MG tablet Take 20 mg by mouth as needed       tamsulosin (FLOMAX) 0.4 MG capsule 0.4mg tab by mouth daily @ 9pm 90 capsule 3     timolol maleate (TIMOPTIC) 0.5 % ophthalmic solution 1 drop right eye twice daily @ 9am and 9pm and 1 drop left eye only in morning at 9am           Medications

## 2021-03-23 ENCOUNTER — VIRTUAL VISIT (OUTPATIENT)
Dept: NEUROLOGY | Facility: CLINIC | Age: 66
End: 2021-03-23
Payer: MEDICAID

## 2021-03-23 DIAGNOSIS — R41.89 COGNITIVE IMPAIRMENT: ICD-10-CM

## 2021-03-23 DIAGNOSIS — G20.A1 PARKINSON DISEASE (H): Primary | ICD-10-CM

## 2021-03-23 PROCEDURE — 99214 OFFICE O/P EST MOD 30 MIN: CPT | Mod: GT | Performed by: PSYCHIATRY & NEUROLOGY

## 2021-03-23 RX ORDER — CIPROFLOXACIN 500 MG/1
TABLET, FILM COATED ORAL
COMMUNITY
Start: 2021-03-20 | End: 2021-06-08

## 2021-03-23 RX ORDER — RIVASTIGMINE 4.6 MG/24H
PATCH, EXTENDED RELEASE TRANSDERMAL
Qty: 30 PATCH | Refills: 11 | Status: SHIPPED | OUTPATIENT
Start: 2021-03-23 | End: 2021-06-25

## 2021-03-23 RX ORDER — RIVASTIGMINE 9.5 MG/24H
PATCH, EXTENDED RELEASE TRANSDERMAL
Qty: 30 PATCH | Refills: 11 | Status: SHIPPED | OUTPATIENT
Start: 2021-03-23 | End: 2021-06-25

## 2021-03-23 NOTE — PROGRESS NOTES
Eamon is a 65 year old who is being evaluated via a billable video visit.      How would you like to obtain your AVS? Mychart  If the video visit is dropped, the invitation should be resent by: [email or web address]  Phone backup:  [phone]    Video Start Time:   Video-Visit Details    Type of service:  Video Visit    Video End Time:    Originating Location (pt. Location): Care facility    Distant Location (provider location):  [facility] [place]     Platform used for Video Visit: [employer]

## 2021-03-23 NOTE — PATIENT INSTRUCTIONS
(G20) Parkinson disease (H)  (primary encounter diagnosis)  Carbidopa/levodopa rytary 245mg 3 tabs 3/day  Ropinirole requip ER 2mg 24 hr daily     Visit with Aileen Knutson and Christopher (sister) today  Marlin and Christopher are POA - medical  Brother driss is financial POA   from [employer] is trying to keep     Weekly nursing visit  Home health aide 2/week  PCA - not sure how frequently; not M-F  Not taking rivastigmine/exelon      Gait/Balance/Falls   He continues to be a fall risk  He has had a fall in the past week     Exercise/Therapy      Cognitive/Driving   Has confusion and dementia  Not on rivastigmine/exelon  He has missed doses of his medication -  Missed 3 doses last week  Not always using a walker.      Mood      Hallucinations/delusions *  Quetiapine seroquel 50mg at 9pm  Thinking there are students there who are smoking marijuana cigarettes and sneaking in windows  Has some (possible delusions) about lingering problems from mold.      Sleep      Bladder   Nocturia 2-6/noc  Finasteride proscar 5mg daily   mirabegron myrbetriq 25mg daily  tamsulosin flomax 0.4mg - this was stopped     GI/Constipation/GERD   Fleet enema to help his colonoscopy on 3/31/2021     ENDO   Cholecalciferol vitamin d3 125 mcg (5000 units) daily     Cardio/heart 118/78; last week was 98/65  100% oxygen; pulse 73. 975 temperature fahrenheit  Had an episode of low blood pressure  Was slumped in a chair. May have been in the 70s  Had seen his pcp and went off tamsulosin  113/80 today and has been 130s      Vision   Latanoprost xalatan 0.005% opthalmic solution both eyes at night  Timolol timoptic 0.5% ophthalmic solution 1 drop right eye twice daily and 1 drop left eye in morning     Heme   Aspirin 81mg daily - will be on hold for a week - he is getting colonoscopy on 3/31/2021     Other:  Fluticasone flonase 50 mcg/act nasal spray - not using but has it as needed    Ibuprofen advil/motrin 600mg as needed    Ketoconazole cream nizoral  2% cream -using   Ketoconazole shampoo 2%  - using  Mometasone elocon 0.1% cream    cipro prior to procedure        Medications     9am 3pm 9pm         Aspirin 81mg 1               Carbidopa-levodopa ER rytary 245mg 3 3 3         Cholecalciferol vitamin d3 125 mcg (5000 units)  1           Finasteride proscar 5mg 1           Fluticasone flonase 50 mcg/act nasal spray Not using As needed         Ibuprofen advil/motrin 600mg  as needed           Ketoconazole nizoral 2% ceam             Ketoconazole nizoral 2% shampoo             Latanoprost xalatan 0.005% opthalmic solution       Both eyes         Mirabegron myrbetriq 25mg 24 hr tablet 1            Mometasone elocon 0.1% cream             Quetiapine seroquel 50mg     1       Ropinirole requip XL 2mg TB24 1                Tamsulosin flomax 0.4mg  stopped      1         Timolol timoptic 0.5% ophthalmic solution Both eyes      just right eye                                                    Plan:    After colonoscopy would have him start on the rivastigmine patch    Start 1st April using 4.6mg patch daily for one month then increase to 9.5mg patch daily    Will need someone to put on the patch and move the site daily  See this website for more information about applying the patch and monitoring for bladder,  Heart rate and blood pressure issues as well as a rash.     [email or web address]

## 2021-03-23 NOTE — LETTER
3/23/2021       RE: Eamon Whalen  [address]  [place] [address]     Dear Colleague,    Thank you for referring your patient, Eamon Whalen, to the [facility] [place] [facility]. Please see a copy of my visit note below.        VIDEO VISIT - using [employer] to connect via her phone number     Date of Visit: March 23, 2021  Name: Eamon Whalen  Date of Birth 1955  [address]   [place] [id number] [phone] (M)  [phone] (H)  [email or web address]  Has mychart  No clear daughter has set up mychart  Marlin Treviño daughter - alejandro username 1982  [phone] mobile    Christopher Mckeon sister     younger sister in [place]  [phone]     Zully - visiting once per week to fill medications  [phone]     People involved in care.   Christopher Mckeon His sister - phone and Joe her  - [phone]  Lives a few miles away. Someone is checking on him every day.   Pee Whalen - brother     [email or web address]  [phone]     [email or web address]   Trista phone number is [phone]    Sister is granted medical decision making ability today as Eamon is not able to make decisions all the time and as discussed with Eamon, his sister and nurse that she should be making decisions by default taking in account his opinion as best can be done. For now he will remain in his home with services but this may change in the future     Continue on rytary 3 capsules 3/day     He will see his primary tomorrow to review his blood pressure and help determine if he needs a morning dose of proamatine/midodrine 2.5mg to increase his blood pressure. Not sure if his blood pressure has always been below 100 every morning. May need an order from Dr. John in regards to getting bp readings in his home.      At some point would like to try a cognitive enhancing medication like  rivastigmine/exelon patch starting with a daily dose of 4.6mg for one month then 9.5mg daily dose - but this can reduce blood pressure and heart rate.     Assessment:  (G20) Parkinson disease (H)  (primary encounter diagnosis)  Carbidopa/levodopa rytary 245mg 3 tabs 3/day  Ropinirole requip ER 2mg 24 hr daily     Visit with Aileen Kntuson and Christopher (sister) today  Marlin and Christopher are POA - medical  Brother driss is financial POA   from [employer] is trying to keep     Weekly nursing visit  Home health aide 2/week  PCA - not sure how frequently; not M-F  Not taking rivastigmine/exelon      Gait/Balance/Falls   He continues to be a fall risk  He has had a fall in the past week     Exercise/Therapy      Cognitive/Driving   Has confusion and dementia  Not on rivastigmine/exelon  He has missed doses of his medication -  Missed 3 doses last week  Not always using a walker.      Mood      Hallucinations/delusions *  Quetiapine seroquel 50mg at 9pm  Thinking there are students there who are smoking marijuana cigarettes and sneaking in windows  Has some (possible delusions) about lingering problems from mold.      Sleep      Bladder   Nocturia 2-6/noc  Finasteride proscar 5mg daily   mirabegron myrbetriq 25mg daily  tamsulosin flomax 0.4mg - this was stopped     GI/Constipation/GERD   Fleet enema to help his colonoscopy on 3/31/2021     ENDO   Cholecalciferol vitamin d3 125 mcg (5000 units) daily     Cardio/heart 118/78; last week was 98/65  100% oxygen; pulse 73. 975 temperature fahrenheit  Had an episode of low blood pressure  Was slumped in a chair. May have been in the 70s  Had seen his pcp and went off tamsulosin  113/80 today and has been 130s      Vision   Latanoprost xalatan 0.005% opthalmic solution both eyes at night  Timolol timoptic 0.5% ophthalmic solution 1 drop right eye twice daily and 1 drop left eye in morning     Heme   Aspirin 81mg daily - will be on hold for a week - he is getting colonoscopy on  3/31/2021     Other:  Fluticasone flonase 50 mcg/act nasal spray - not using but has it as needed    Ibuprofen advil/motrin 600mg as needed    Ketoconazole cream nizoral 2% cream -using   Ketoconazole shampoo 2%  - using  Mometasone elocon 0.1% cream    cipro prior to procedure        Medications     9am 3pm 9pm         Aspirin 81mg 1               Carbidopa-levodopa ER rytary 245mg 3 3 3         Cholecalciferol vitamin d3 125 mcg (5000 units)  1           Finasteride proscar 5mg 1           Fluticasone flonase 50 mcg/act nasal spray Not using As needed         Ibuprofen advil/motrin 600mg  as needed           Ketoconazole nizoral 2% ceam             Ketoconazole nizoral 2% shampoo             Latanoprost xalatan 0.005% opthalmic solution       Both eyes         Mirabegron myrbetriq 25mg 24 hr tablet 1            Mometasone elocon 0.1% cream             Quetiapine seroquel 50mg     1       Ropinirole requip XL 2mg TB24 1                Tamsulosin flomax 0.4mg  stopped      1         Timolol timoptic 0.5% ophthalmic solution Both eyes      just right eye                                                    Plan:    After colonoscopy would have him start on the rivastigmine patch    Start 1st April using 4.6mg patch daily for one month then increase to 9.5mg patch daily    Will need someone to put on the patch and move the site daily  See this website for more information about applying the patch and monitoring for bladder,  Heart rate and blood pressure issues as well as a rash.     [email or web address]    He granted proxy access to his daughter and relaying information on her username to her.     Medical Decision Making:  #  Chronic progressive medical conditions addressed  Yes - cognitive and parkinson and gi  Review and/or interpretation of unique test or documentation from a provider outside of neurology  "no   Independent historian provided additional details  Yes - [place]   Prescription drug management and review of potential side effects and/or monitoring for side effects  yes   Health impacted by social determinants of health  Yes - cognitive issues    I have reviewed the note as documented above.  This accurately captures the substance of my conversation with the patient and total time spent preparing for visit, executing visit and completing visit on the day of the visit:  30 minutes.     Edison Villasenor MD      ------------------------------------------------------------------------------------------------------------------------------------------------------------------------    Video-Visit Details    The patient has been notified of following:     \"After a review of the patient s situation, this visit was changed from an in-person visit to a video visit to reduce the risk of COVID-19 exposure.   The patient is being evaluated via a billable video visit.\"    \"This video visit will be conducted via a call between you and your physician/provider. We have found that certain health care needs can be provided without the need for an in-person physical exam.  This service lets us provide the care you need with a video conversation.  If a prescription is necessary we can send it directly to your pharmacy.  If lab work is needed we can place an order for that and you can then stop by our lab to have the test done at a later time.    If during the course of the call the physician/provider feels a video visit is not appropriate, you will not be charged for this service.\"     Patient has given verbal consent for Video visit? Yes    Patient would like the video invitation sent by:     Type of service:  Video Visit    Video Start Time:     Video End Time (time video stopped):     Duration:  minutes - see above    Originating Location (pt. Location):     Distant Location (provider location):  [facility] " [facility]     Mode of Communication:  Video Conference via [email or web address] (and if not possible then doximity)      Edison Villasenor MD      --------------------------------------------------------------------------------------------------------------    Eamon Whalen is a 65 year old male who is being evaluated via a billable video visit.      Charts reviewed  Consult from  Images reviewed        I have reviewed and updated the patient's Past Medical History, Social History, Family History and Medication List.    ALLERGIES  Cats and Dogs    Lasts visit details if there was a last visit:       14 Review of systems  are negative except for   Patient Active Problem List   Diagnosis     Paralysis agitans (H)     Wears glasses     Balance problems     Constipation     GERD (gastroesophageal reflux disease)     Urinary urgency     Insomnia     Seborrhea     Lumbago     Hearing loss     Family history of Parkinson's disease     Central retinal vein occlusion     Choroidal nevus     Vision problem -- Right Eye     ED (erectile dysfunction)     Other specified glaucoma     Abnormal electrocardiogram     Bifascicular block     Prolonged Q-T interval on ECG     History of EMG     Encounter for examination for driving license     Parkinson disease (H)     Elevated prostate specific antigen (PSA)     Family history of epilepsy and other diseases of the nervous system     Gastroesophageal reflux disease     Impotence of organic origin     Other abnormalities of gait and mobility     Personal history of other medical treatment (CODE)     Benign neoplasm of choroid     Weakness     Altered mental status     Visual hallucinations     Personal history of other medical treatment        Allergies   Allergen Reactions     Cats      Dogs      Past Surgical History:   Procedure Laterality Date     NO HISTORY OF SURGERY       Past Medical History:   Diagnosis Date     Abnormal electrocardiogram 3/23/2017     Balance problems      Bifascicular  block 3/23/2017     Central retinal vein occlusion 4/11/2014     Choroidal nevus 4/11/2014     Constipation      Elevated prostate specific antigen (PSA) 2/20/2018     Family history of Parkinson's disease      GERD (gastroesophageal reflux disease)      Glaucoma 6/4/2015     Hearing loss      History of EMG 2/20/2018    [facility]  Nerve Conduction & EMG Report  Patient:       Eamon Whalen Patient ID:    [id number] Gender:        Male YOB: 1955 Age:           62 Years 2 Months    History & Examination:  62 year old man with history of Parkinson's disease, restless leg syndrome, and paresthesias in feet and legs. Query polyneuropathy. His clinical examination      Lumbago      Parkinson's disease (H)      Prolonged Q-T interval on ECG 3/23/2017     Urinary urgency      Wears glasses      Social History     Socioeconomic History     Marital status: Single     Spouse name: Not on file     Number of children: Not on file     Years of education: Not on file     Highest education level: Not on file   Occupational History     Occupation: farm     Employer: SELF EMPLOYED.   Social Needs     Financial resource strain: Not on file     Food insecurity     Worry: Not on file     Inability: Not on file     Transportation needs     Medical: Not on file     Non-medical: Not on file   Tobacco Use     Smoking status: Never Smoker     Smokeless tobacco: Never Used     Tobacco comment: cigar sometimes   Substance and Sexual Activity     Alcohol use: Yes     Comment: occ     Drug use: No     Sexual activity: Yes     Partners: Female   Lifestyle     Physical activity     Days per week: Not on file     Minutes per session: Not on file     Stress: Not on file   Relationships     Social connections     Talks on phone: Not on file     Gets together: Not on file     Attends [employer] service: Not on file     Active member of club or organization: Not on file     Attends meetings of  "clubs or organizations: Not on file     Relationship status: Not on file     Intimate partner violence     Fear of current or ex partner: Not on file     Emotionally abused: Not on file     Physically abused: Not on file     Forced sexual activity: Not on file   Other Topics Concern     Parent/sibling w/ CABG, MI or angioplasty before 65F 55M? Not Asked   Social History Narrative    1 daughter who is  and lives in [place]    She was pregnant with the 1st child and had some problems in the 6-7 month of pregnancy with     Baby that may have hydrocephalus as there was IUGR. The baby  after 3 months in 2011. She was encouraged her to undergo genetic testing which was negative and they are considering having more children.     He met a woman and may  a woman from [place] - who has [place] roots. 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 He met her 3 times. Her name is Tavia and lives in [facility]. They will be visiting within a day or so with her mother. She is 36 yrs old. She is not going back there.     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 Depending on the status of the main house may build new house.         [place] in his home town had parkinson    [place] lived [place] side of [facility] [place] of [place] miles.         This was way before pesticides        Maternal grandfather     Dax Sorenson - Algerian name     He would have to be brought out to a rocking chair    \"hardening of there arteries.         mother     Family History   Problem Relation Age of Onset     Heart Disease Father      Dementia Mother      Other - See Comments Daughter         living in [facility]     Other - See Comments Brother      Other - See Comments Brother      Other - See Comments Sister      Other - See Comments Sister      Other - See Comments Sister      " Parkinsonism Paternal Uncle      Parkinsonism Other         paternal great grand mother     Other - See Comments Grandchild         mora's son     Other - See Comments Grandchild         mora's son     Current Outpatient Medications   Medication Sig Dispense Refill     aspirin 81 MG EC tablet 81mg tab by mouth daily @8/9am       carbidopa-levodopa ER (RYTARY) 61. MG CPCR per CR capsule 3 capsules by mouth 3/day @9am, 3pm and 9pm 270 capsule 11     cholecalciferol 50 MCG (2000 UT) CAPS 2000 units by mouth daily @8/9am       finasteride (PROSCAR) 5 MG tablet 5mg tab by mouth daily @8/9am 30 tablet 3     fluticasone (FLONASE) 50 MCG/ACT nasal spray Not clear if using 1 spray each nostril daily       ibuprofen (ADVIL/MOTRIN) 600 MG tablet Take 600 mg by mouth every 6 hours as needed for moderate pain       ketoconazole (NIZORAL) 2 % external cream Apply topically as needed for itching       ketoconazole (NIZORAL) 2 % external shampoo Apply topically daily as needed for itching or irritation       latanoprost (XALATAN) 0.005 % ophthalmic solution One drop in both eyes @ 9pm 2.5 mL 1     mirabegron (MYRBETRIQ) 25 MG 24 hr tablet Take 1 tablet (25 mg) by mouth daily 90 tablet 3     mometasone (ELOCON) 0.1 % external cream Apply topically as needed       QUEtiapine (SEROQUEL) 50 MG tablet Take 1 tablet (50 mg) by mouth At Bedtime (9pm) 90 tablet 3     rOPINIRole (REQUIP ER) 2 MG 24 hr tablet 2mg tab by mouth every morning @ 8/9 am 30 tablet 5     sildenafil (REVATIO) 20 MG tablet Take 20 mg by mouth as needed       tamsulosin (FLOMAX) 0.4 MG capsule 0.4mg tab by mouth daily @ 9pm 90 capsule 3     timolol maleate (TIMOPTIC) 0.5 % ophthalmic solution 1 drop right eye twice daily @ 9am and 9pm and 1 drop left eye only in morning at 9am           Medications                                                                                                                                                                                                                     Eamon is a 65 year old who is being evaluated via a billable video visit.      How would you like to obtain your AVS? Mychart  If the video visit is dropped, the invitation should be resent by: [email or web address]  Phone backup:  [phone]    Video Start Time:   Video-Visit Details    Type of service:  Video Visit    Video End Time:    Originating Location (pt. Location): Care facility    Distant Location (provider location):  [facility] [place]     Platform used for Video Visit: BladimirWell      Again, thank you for allowing me to participate in the care of your patient.      Sincerely,    Edison Villasenor MD

## 2021-03-24 ENCOUNTER — TELEPHONE (OUTPATIENT)
Dept: NEUROLOGY | Facility: CLINIC | Age: 66
End: 2021-03-24

## 2021-03-24 ENCOUNTER — TELEPHONE (OUTPATIENT)
Dept: NEUROLOGY | Facility: CLINIC | Age: 66
End: 2021-03-24
Payer: MEDICAID

## 2021-03-24 NOTE — TELEPHONE ENCOUNTER
Central Prior Authorization Team   Phone: [phone]      [place] Initiation    Medication: rivastigmine (EXELON) 4.6 MG/24HR 24 hr patch  Insurance Company: [place] Medicaid ([facility]) - Phone [phone] Fax [phone]  Pharmacy Filling the Rx: [id number] [place], [place] - [address] [place] [address]  Filling Pharmacy Phone: [phone]  Filling Pharmacy Fax:    Start Date: 3/24/2021

## 2021-03-24 NOTE — TELEPHONE ENCOUNTER
Central Prior Authorization Team   Phone: [phone]      PA Initiation    Medication: rivastigmine (EXELON) 9.5 MG/24HR 24 hr patch  Insurance Company: [place] Medicaid ([facility]) - Phone [phone] Fax [phone]  Pharmacy Filling the Rx: [id number] [place], [place] - [address] [place] [address]  Filling Pharmacy Phone: [phone]  Filling Pharmacy Fax:    Start Date: 3/24/2021

## 2021-03-26 NOTE — TELEPHONE ENCOUNTER
PRIOR AUTHORIZATION DENIED    Medication: rivastigmine (EXELON) 4.6 MG/24HR 24 hr patch    Denial Date: 3/24/2021    Denial Rational:   Covered alternatives can be found at  [email or web address]            Appeal Information:

## 2021-03-26 NOTE — TELEPHONE ENCOUNTER
The preferred medication alternatives that Eamon's insurance is requesting are. They would like him to try both before trying rivastigmine.  DONEPEZIL TABLET (ORAL)  MEMANTINE TABLET (ORAL)

## 2021-03-26 NOTE — TELEPHONE ENCOUNTER
PRIOR AUTHORIZATION DENIED    Medication: rivastigmine (EXELON) 9.5 MG/24HR 24 hr patch    Denial Date: 3/24/2021    Denial Rational:   Covered alternatives can be found at  [email or web address]          Appeal Information:

## 2021-03-29 NOTE — TELEPHONE ENCOUNTER
Medication Appeal Initiation    We have initiated an appeal for the requested medication:    Medication: rivastigmine (EXELON) 9.5 MG/24HR 24 hr patch  Appeal Start Date:  3/29/2021  Insurance Company: [place] Medicaid ([facility]) - Phone [phone] Fax [phone]  Comments:  Appeal has been initiated.  I have faxed original denial letter along with Letter of Medical Necessity (letters tab) to [employer] - Attn: , fax# [phone]. Marked for urgent review.

## 2021-03-29 NOTE — TELEPHONE ENCOUNTER
Medication Appeal Initiation    We have initiated an appeal for the requested medication:    Medication: rivastigmine (EXELON) 4.6 MG/24HR 24 hr patch  Appeal Start Date:  3/29/2021  Insurance Company: [place] Medicaid ([facility]) - Phone [phone] Fax [phone]  Comments:  Appeal has been initiated.  I have faxed original denial letter along with Letter of Medical Necessity (letters tab) to [employer] - Attn: , fax# [phone]. Marked for urgent review.

## 2021-03-30 DIAGNOSIS — F22 DELUSIONAL DISORDER (H): ICD-10-CM

## 2021-03-30 DIAGNOSIS — R41.89 COGNITIVE CHANGE: Primary | ICD-10-CM

## 2021-03-30 RX ORDER — DONEPEZIL HYDROCHLORIDE 5 MG/1
TABLET, FILM COATED ORAL
Qty: 30 TABLET | Refills: 11 | Status: SHIPPED | OUTPATIENT
Start: 2021-03-30 | End: 2021-04-23 | Stop reason: DRUGHIGH

## 2021-03-30 NOTE — TELEPHONE ENCOUNTER
"Plan:   Dr. Villasenor has prescribed donepzil 5 mg tablets as a covered alternative to Rivastigmine which is not covered by Eamon's insurance.    I reviewed the following side effects with Christopher and asked her to notify us if Eamon experiences or reports any of these to her.  Possible side effects of Donepezil (Aricept):  1. Allergic reaction: Itching or hives, swelling in your face or hands, swelling or tingling in your mouth or throat, chest tightness, trouble breathing   2. Bloody or black, tarry stools   3. Change in how much or how often you urinate   4. Chest pain, slow or uneven heartbeat, trouble breathing   5. Lightheadedness, dizziness, fainting   6. Seizures   7. Severe stomach pain   8. Unusual bleeding, bruising, or weakness   9. Vomiting of blood or material that looks like coffee grounds  10. Mild nausea, vomiting, or diarrhea   11. Weight loss  Eamon will be getting his prostate biopsy today. She wanted to let Dr. Villasenor know that she does not think he will be handle anything (cemotherapy, radiation) if it comes back positive for cancer. She feels the urologist is \"pushy\" and is afraid he will push Eamon towards a treatment if it is positive.  She is thinking about making calls and placing Eamon on wait lists for nursing homes. Some days he seems like he needs a lot of extra care and other days he does okay. She also asks where he can get the vaccine.    Plan/recommendation:  1. Eamon will start Donepezil next Tuesday    2. Christopher will get Eamon on some wait lists. If he is next on the wait list and they want to defer to the next person they can do this without losing his place on the wait list.    3. I will send a [employer] message with vaccine location information. Christopher politely declined me sending a summary of Donepezil's side effects. She always gets this from the pharmacy.    4. If the prostate biopsy shows something Christopher will have a care conference to discuss with family and Eamon on next steps that will be " taken.

## 2021-03-30 NOTE — TELEPHONE ENCOUNTER
MEDICATION APPEAL DENIED    Medication: rivastigmine (EXELON) 9.5 MG/24HR 24 hr patch    Denial Date: 3/30/2021    Denial Rational:               Second Level Appeal Information:   Second level appeals will be managed by the clinic staff and provider. Please contact the [employer] Prior Authorization Team if additional information about the denial is needed.

## 2021-03-30 NOTE — TELEPHONE ENCOUNTER
MEDICATION APPEAL DENIED    Medication: rivastigmine (EXELON) 4.6 MG/24HR 24 hr patch    Denial Date: 3/30/2021    Denial Rational:         Second Level Appeal Information:   Second level appeals will be managed by the clinic staff and provider. Please contact the [employer] Prior Authorization Team if additional information about the denial is needed.

## 2021-04-15 ENCOUNTER — TELEPHONE (OUTPATIENT)
Dept: NEUROLOGY | Facility: CLINIC | Age: 66
End: 2021-04-15

## 2021-04-15 NOTE — TELEPHONE ENCOUNTER
[facility]    Phone Message    May a detailed message be left on voicemail: yes     Reason for Call: Other: [place] calling to state that patient had no missed dosas this week but did have 2 missed dosas last week plus a fall. Please call to discuss if needed thank you.      Action Taken: Message routed to:  [facility] neurosurgery    Travel Screening: Not Applicable

## 2021-04-23 ENCOUNTER — TELEPHONE (OUTPATIENT)
Dept: NEUROLOGY | Facility: CLINIC | Age: 66
End: 2021-04-23

## 2021-04-23 DIAGNOSIS — R41.89 COGNITIVE CHANGE: ICD-10-CM

## 2021-04-23 DIAGNOSIS — F22 DELUSIONAL DISORDER (H): ICD-10-CM

## 2021-04-23 RX ORDER — DONEPEZIL HYDROCHLORIDE 10 MG/1
10 TABLET, FILM COATED ORAL AT BEDTIME
Qty: 90 TABLET | Refills: 3 | Status: SHIPPED | OUTPATIENT
Start: 2021-04-23 | End: 2023-01-01

## 2021-04-23 NOTE — TELEPHONE ENCOUNTER
[facility]    Phone Message    May a detailed message be left on voicemail: yes     Reason for Call: Medication Question or concern regarding medication   Prescription Clarification  Name of Medication: donepezil (ARICEPT) 10 MG tablet  Prescribing Provider: Dr Villasenor   Pharmacy: [id number] ([place] [place] - [address]   What on the order needs clarification? Pharmacy calling in they have the 5MG ready with a 15 day supply, as they where ringing up the pt the 10mg rx was submitted, pt got the 5 mg and not the 10mg , please call back to discuss further           Action Taken: Message routed to:  [facility] Neurology    Travel Screening: Not Applicable

## 2021-04-26 NOTE — TELEPHONE ENCOUNTER
Called [facility] and spoke to  Rodriguez (pharmacist). Rodriguez stated that the system canceled his Donepezil 5 mg tablets prescription as they were checking him out for it. This occurred from the Donepezil 10 mg prescription being received. They gave him the prescription but need the order for the 5 mg tablets in there system to show they dispensed an active prescription to him.    I confirmed the below prescription that was active prior to the Donepezil 10 mg prescription (see below). She will be able to use the old prescription to fix their computer records.   donepezil (ARICEPT) 5 MG tablet (Discontinued) 30 tablet 11 3/30/2021 2021 --   Simg tab by mouth daily x 1 month then 10mg daily

## 2021-04-29 ENCOUNTER — TELEPHONE (OUTPATIENT)
Dept: NEUROLOGY | Facility: CLINIC | Age: 66
End: 2021-04-29

## 2021-04-29 NOTE — TELEPHONE ENCOUNTER
[facility] Call Center    Phone Message    May a detailed message be left on voicemail: no     Reason for Call: Other: Madison calling to inform Dr. Villasenor that Eamon missed 2 doses of his pm meds in the last 2 weeks.    Action Taken: Message routed to:  [facility]    Travel Screening: Not Applicable

## 2021-05-10 ENCOUNTER — TELEPHONE (OUTPATIENT)
Dept: NEUROLOGY | Facility: CLINIC | Age: 66
End: 2021-05-10

## 2021-05-10 NOTE — TELEPHONE ENCOUNTER
[facility] Call Center    Phone Message    May a detailed message be left on voicemail: yes     Reason for Call: Other: Madison calling with [facility] looking to update Dr. Villasenor and care team. States that it was brought to their attention that patient was hospitalized this weekend and patients family found him on the floor. States that they do not know the discharge date. States that it seems like patient may go to short term rehab after hospitalization. States that they will need to do a transfer of services from home health to hospital. States that patient is scheduled on 5/20 for re-certification and states that if patient is not home by then they will most likely discharge patient.     Aileen states that she will call back with an update when she receives more information.     Please advise and call Aileen back with any questions or concerns.     Action Taken: Other: [facility]    Travel Screening: Not Applicable

## 2021-06-07 PROBLEM — R29.6 REPEATED FALLS: Status: ACTIVE | Noted: 2021-05-19

## 2021-06-07 PROBLEM — G31.83 LEWY BODY DEMENTIA (H): Status: ACTIVE | Noted: 2021-05-19

## 2021-06-07 PROBLEM — N40.0 BENIGN PROSTATIC HYPERPLASIA WITHOUT URINARY OBSTRUCTION: Status: ACTIVE | Noted: 2021-05-20

## 2021-06-07 PROBLEM — F02.80 LEWY BODY DEMENTIA (H): Status: ACTIVE | Noted: 2021-05-19

## 2021-06-07 PROBLEM — Z92.89 PERSONAL HISTORY OF OTHER MEDICAL TREATMENT: Status: ACTIVE | Noted: 2018-02-20

## 2021-06-07 RX ORDER — BISACODYL 5 MG
TABLET, DELAYED RELEASE (ENTERIC COATED) ORAL
COMMUNITY
Start: 2021-04-14 | End: 2021-06-25

## 2021-06-07 RX ORDER — KETOCONAZOLE 20 MG/G
1 CREAM TOPICAL
COMMUNITY
Start: 2021-05-20 | End: 2021-06-08

## 2021-06-07 RX ORDER — LEVODOPA AND CARBIDOPA 245; 61.25 MG/1; MG/1
2 CAPSULE, EXTENDED RELEASE ORAL 3 TIMES DAILY
COMMUNITY
Start: 2021-05-20 | End: 2021-06-08

## 2021-06-07 RX ORDER — QUETIAPINE FUMARATE 100 MG/1
100 TABLET, FILM COATED ORAL
COMMUNITY
Start: 2021-05-20 | End: 2021-06-25

## 2021-06-07 RX ORDER — TAMSULOSIN HYDROCHLORIDE 0.4 MG/1
0.4 CAPSULE ORAL
COMMUNITY
Start: 2021-05-20 | End: 2021-06-25

## 2021-06-07 RX ORDER — LANOLIN ALCOHOL/MO/W.PET/CERES
3 CREAM (GRAM) TOPICAL
COMMUNITY
Start: 2021-05-20 | End: 2021-06-25

## 2021-06-07 RX ORDER — QUETIAPINE FUMARATE 25 MG/1
25 TABLET, FILM COATED ORAL
COMMUNITY
Start: 2021-05-20 | End: 2021-06-25

## 2021-06-07 RX ORDER — ASPIRIN 325 MG
325 TABLET ORAL
COMMUNITY
Start: 2021-05-20 | End: 2021-06-25

## 2021-06-07 RX ORDER — AMOXICILLIN 250 MG
1 CAPSULE ORAL
COMMUNITY
Start: 2021-05-20 | End: 2021-06-25

## 2021-06-08 RX ORDER — LEVODOPA AND CARBIDOPA 245; 61.25 MG/1; MG/1
CAPSULE, EXTENDED RELEASE ORAL
Qty: 540 CAPSULE | Refills: 3 | COMMUNITY
Start: 2021-06-08 | End: 2021-06-25

## 2021-06-08 NOTE — PROGRESS NOTES
VIDEO VISIT    Date of Visit: June 25, 2021  Name: Eamon Whalen  Date of Birth 1955  [address]   [place] [id number] [phone] (M)  [phone] (H)  [email or web address]  Has mychart  No clear daughter has set up mychart  Marlin Treviño daughter - alejandro username 1982  [phone] mobile     Christopher Mckeon sister     younger sister in [place]  [phone]      Zully - visiting once per week to fill medications  [phone]     People involved in care.   Christopher Mckeon His sister - phone and Joe her  - [phone]  Lives a few miles away. Someone is checking on him every day.   Pee Whalen - brother     [email or web address] [email or web address]  [phone]     [email or web address]   Trista phone number is [phone]    Verify [phone],     [email or web address]    [facility]  [address]  [id number]    [facility]    Nurse Thi Star    Sister is granted medical decision making ability today as Eamon is not able to make decisions all the time and as discussed with Eamon, his sister and nurse that she should be making decisions by default taking in account his opinion as best can be done. For now he will remain in his home with services but this may change in the future    Assessment:  (G20) Parkinson disease (H)  (primary encounter diagnosis)  Carbidopa/levodopa rytary 245mg 2 tabs 3/day  Ropinirole requip ER 2mg 24 hr daily - stopped    Bit more tremor  Bit of drooling    he has had falls - on arising and when trying to sit down  Started physical therapy yesterday      Jose Alfredo Knutson and Christopher (sister)   Marlin and Christopher are POA - medical  Brother driss is financial POA   from [employer] is trying to keep     Weekly nursing visit  Home health aide 2/week  PCA - not sure how frequently; not M-F      Gait/Balance/Falls   He continues to be a fall risk  He has had a fall in the  past week     Exercise/Therapy      Cognitive/Driving   Has confusion and dementia  Donepezil aricept 10mg daily  Rivastigmine/exelon - not using  Not always using a walker.      Mood   Citalopram celexa 10mg started a week or so ago.     [facility] providers came by     Hallucinations/delusions   Quetiapine seroquel 100mg at 9pm  Quetiapine seroquel 25mg 2/day as needed    Thinking there are students there who are smoking marijuana cigarettes and sneaking in windows  Has some (possible delusions) about lingering problems from mold.      Sleep      Bladder   Nocturia 2-6/noc  Finasteride proscar 5mg daily   mirabegron myrbetriq 25mg daily - not taking  tamsulosin flomax 0.4mg - daily    Had a recent bladder infection     GI/Constipation/GERD   Fleet enema to help his colonoscopy on 3/31/2021     ENDO   Cholecalciferol vitamin d3 50 mcg (2000 units) daily     Cardio/heart 118/78; last week was 98/65  100% oxygen; pulse 73. 975 temperature fahrenheit  Had an episode of low blood pressure  Was slumped in a chair. May have been in the 70s  Had seen his pcp and went off tamsulosin  113/80 today and has been 130s      Vision   Latanoprost xalatan 0.005% opthalmic solution both eyes at night  Timolol timoptic 0.5% ophthalmic solution 1 drop right eye twice daily and 1 drop left eye in morning     Heme   Aspirin 325mg daily      Other:  Fluticasone flonase 50 mcg/act nasal spray - not using      ACETAMINOPHEN TYLENOL 325MG as needed     Ketoconazole cream nizoral 2% cream -using   Ketoconazole shampoo 2%  - using  Mometasone elocon 0.1% cream         Medications     9am 3pm 9pm         Acetaminophen tylenol 325mg  2 As needed       ASpirin 325mg 1       Carbidopa-levodopa ER rytary 245mg 2 2 2         Cholecalciferol vitamin d3 125 mcg (5000 units)  1           Citalopram celexa 10mg 1       Donepezil aricept 10mg   1     Finasteride proscar 5mg 1           Ketoconazole nizoral 2% cream daily            Ketoconazole  nizoral 2% shampoo 2-3/week            Latanoprost xalatan 0.005% opthalmic solution       Both eyes         Melatonin 3mg    1     Mirabegron myrbetriq 25mg 24 hr tablet Not taking            Mometasone elocon 0.1% cream  not taking           Quetiapine seroquel 100mg   1     Quetiapine seroquel 25mg  2/day as needed       Rivastigmine exelon 4.6mg/2r hr patch Never started       Ropinirole requip XL 2mg TB24 stopped              Senna-docusate Senokot-S 8.6-50 1  1     Tamsulosin flomax 0.4mg       1         Timolol timoptic 0.5% ophthalmic solution Both eyes      just right eye                                                      Plan:    He is doing much better with  His medication changes - still having some mood issues/behavioral problems and clearly needs ongoing care as he is unable to manage his parkinson to the degree that is required.          Medications     9am 3pm 9pm         Acetaminophen tylenol 325mg  2 As needed       ASpirin 325mg 1       Carbidopa-levodopa ER rytary 245mg 2 2 2         Cholecalciferol vitamin d3 125 mcg (5000 units)  1           Citalopram celexa 10mg 1       Donepezil aricept 10mg   1     Finasteride proscar 5mg 1           Ketoconazole nizoral 2% cream daily            Ketoconazole nizoral 2% shampoo 2-3/week            Latanoprost xalatan 0.005% opthalmic solution       Both eyes         Melatonin 3mg    1     Quetiapine seroquel 100mg   1     Quetiapine seroquel 25mg  2/day as needed       Senna-docusate Senokot-S 8.6-50 1  1     Tamsulosin flomax 0.4mg       1         Timolol timoptic 0.5% ophthalmic solution Both eyes      just right eye                                                     Medical Decision Making:  #  Chronic progressive medical conditions addressed  Confusion, delusions, parkinson, etc.   Review and/or interpretation of unique test or documentation from a provider outside of neurology no   Independent historian provided additional details  Yes -= nurse  "  Prescription drug management and review of potential side effects and/or monitoring for side effects  yes   Health impacted by social determinants of health  Yes - facility    I have reviewed the note as documented above.  This accurately captures the substance of my conversation with the patient and total time spent preparing for visit, executing visit and completing visit on the day of the visit:  30 minutes.     Video visit  Start time of video visit 3pm   End of video visit:  330pm    Edison Villasenor MD      ------------------------------------------------------------------------------------------------------------------------------------------------------------------------    Video-Visit Details    The patient has been notified of following:     \"After a review of the patient s situation, this visit was changed from an in-person visit to a video visit to reduce the risk of COVID-19 exposure.   The patient is being evaluated via a billable video visit.\"    \"This video visit will be conducted via a call between you and your physician/provider. We have found that certain health care needs can be provided without the need for an in-person physical exam.  This service lets us provide the care you need with a video conversation.  If a prescription is necessary we can send it directly to your pharmacy.  If lab work is needed we can place an order for that and you can then stop by our lab to have the test done at a later time.    If during the course of the call the physician/provider feels a video visit is not appropriate, you will not be charged for this service.\"     Patient has given verbal consent for Video visit? Yes    Patient would like the video invitation sent by:     Type of service:  Video Visit    Video Start Time:     Video End Time (time video stopped):     Duration:  minutes - see above    Originating Location (pt. Location):     Distant Location (provider location):  [facility] " [facility]     Mode of Communication:  Video Conference via Panna (and if not possible then doximity)      Edison Villasenor MD      --------------------------------------------------------------------------------------------------------------    Eamon Whalen is a 65 year old male who is being evaluated via a billable video visit.      Charts reviewed  Consult from  Images reviewed        I have reviewed and updated the patient's Past Medical History, Social History, Family History and Medication List.    ALLERGIES  Cats and Dogs    Lasts visit details if there was a last visit:       14 Review of systems  are negative except for   Patient Active Problem List   Diagnosis     Paralysis agitans (H)     Wears glasses     Balance problems     Constipation     GERD (gastroesophageal reflux disease)     Urinary urgency     Insomnia     Seborrhea     Lumbago     Hearing loss     Family history of Parkinson's disease     Central retinal vein occlusion     Choroidal nevus     Vision problem -- Right Eye     ED (erectile dysfunction)     Other specified glaucoma     Abnormal electrocardiogram     Bifascicular block     Prolonged Q-T interval on ECG     History of EMG     Encounter for examination for driving license     Parkinson disease (H)     Elevated prostate specific antigen (PSA)     Family history of epilepsy and other diseases of the nervous system     Gastroesophageal reflux disease     Impotence of organic origin     Other abnormalities of gait and mobility     Personal history of other medical treatment (CODE)     Benign neoplasm of choroid     Weakness     Altered mental status     Visual hallucinations     Personal history of other medical treatment     Benign prostatic hyperplasia without urinary obstruction     Lewy body dementia (H)     Repeated falls        Allergies   Allergen Reactions     Cats      Dogs      Past Surgical History:   Procedure Laterality Date     COLONOSCOPY      3/31/2021     NO HISTORY  OF SURGERY       Past Medical History:   Diagnosis Date     Abnormal electrocardiogram 3/23/2017     Balance problems      Bifascicular block 3/23/2017     Central retinal vein occlusion 4/11/2014     Choroidal nevus 4/11/2014     Constipation      Elevated prostate specific antigen (PSA) 2/20/2018     Family history of Parkinson's disease      GERD (gastroesophageal reflux disease)      Glaucoma 6/4/2015     Hearing loss      History of EMG 2/20/2018    [facility]  Nerve Conduction & EMG Report  Patient:       Eamon Whalen Patient ID:    [id number] Gender:        Male YOB: 1955 Age:           62 Years 2 Months    History & Examination:  62 year old man with history of Parkinson's disease, restless leg syndrome, and paresthesias in feet and legs. Query polyneuropathy. His clinical examination      Lumbago      Parkinson's disease (H)      Prolonged Q-T interval on ECG 3/23/2017     Urinary urgency      Wears glasses      Social History     Socioeconomic History     Marital status: Single     Spouse name: Not on file     Number of children: Not on file     Years of education: Not on file     Highest education level: Not on file   Occupational History     Occupation: farm     Employer: SELF EMPLOYED.   Social Needs     Financial resource strain: Not on file     Food insecurity     Worry: Not on file     Inability: Not on file     Transportation needs     Medical: Not on file     Non-medical: Not on file   Tobacco Use     Smoking status: Never Smoker     Smokeless tobacco: Never Used     Tobacco comment: cigar sometimes   Substance and Sexual Activity     Alcohol use: Yes     Comment: occ     Drug use: No     Sexual activity: Yes     Partners: Female   Lifestyle     Physical activity     Days per week: Not on file     Minutes per session: Not on file     Stress: Not on file   Relationships     Social connections     Talks on phone: Not on file     Gets together:  "Not on file     Attends [employer] service: Not on file     Active member of club or organization: Not on file     Attends meetings of clubs or organizations: Not on file     Relationship status: Not on file     Intimate partner violence     Fear of current or ex partner: Not on file     Emotionally abused: Not on file     Physically abused: Not on file     Forced sexual activity: Not on file   Other Topics Concern     Parent/sibling w/ CABG, MI or angioplasty before 65F 55M? Not Asked   Social History Narrative    1 daughter who is  and lives in [place]    She was pregnant with the 1st child and had some problems in the 6-7 month of pregnancy with     Baby that may have hydrocephalus as there was IUGR. The baby  after 3 months in 2011. She was encouraged her to undergo genetic testing which was negative and they are considering having more children.     He met a woman and may  a woman from [place] - who has Palestinian roots. 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 He met her 3 times. Her name is Tavia and lives in [facility]. They will be visiting within a day or so with her mother. She is 36 yrs old. She is not going back there.     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 Depending on the status of the main house may build new house.         [place] in his home town had parkinson    [place] lived [place] side of [facility] [place].         This was way before pesticides        Maternal grandfather     Dax Sorenson - Colombian name     He would have to be brought out to a rocking chair    \"hardening of there arteries.         mother     Family History   Problem Relation Age of Onset     Heart Disease Father      Dementia Mother      Other - See Comments Daughter         living in [facility]     Other - See Comments Brother      Other - " See Comments Brother      Other - See Comments Sister      Other - See Comments Sister      Other - See Comments Sister      Parkinsonism Paternal Uncle      Parkinsonism Other         paternal great grand mother     Other - See Comments Grandchild         mora's son     Other - See Comments Grandchild         mora's son     Current Outpatient Medications   Medication Sig Dispense Refill     aspirin (ASA) 325 MG tablet Take 325 mg by mouth       aspirin 81 MG EC tablet 81mg tab by mouth daily @8/9am       bisacodyl (DULCOLAX) 5 MG EC tablet        carbidopa-levodopa ER (RYTARY) 61. MG CPCR per CR capsule Take 2 capsules by mouth 3 times daily       carbidopa-levodopa ER (RYTARY) 61. MG CPCR per CR capsule 3 capsules by mouth 3/day @9am, 3pm and 9pm 270 capsule 11     cholecalciferol (VITAMIN D3) 125 mcg (5000 units) capsule 125 mcg = 5000 International units by mouth of Vitamin D3 daily at 9am       cholecalciferol 50 MCG (2000 UT) CAPS Take 2,000 Units by mouth       ciprofloxacin (CIPRO) 500 MG tablet TAKE 2 TABLETS BY MOUTH 1 HOUR PRIOR TO PROCEDURE       donepezil (ARICEPT) 10 MG tablet Take 1 tablet (10 mg) by mouth At Bedtime 90 tablet 3     finasteride (PROSCAR) 5 MG tablet 5mg tab by mouth daily @8/9am 30 tablet 3     fluticasone (FLONASE) 50 MCG/ACT nasal spray Not clear if using 1 spray each nostril daily       ibuprofen (ADVIL/MOTRIN) 600 MG tablet Take 600 mg by mouth every 6 hours as needed for moderate pain       ketoconazole (NIZORAL) 2 % external cream Apply 1 Application topically       ketoconazole (NIZORAL) 2 % external cream Apply topically as needed for itching       ketoconazole (NIZORAL) 2 % external shampoo Apply topically daily as needed for itching or irritation       latanoprost (XALATAN) 0.005 % ophthalmic solution One drop in both eyes @ 9pm 2.5 mL 1     melatonin 3 MG tablet Take 3 mg by mouth       mirabegron (MYRBETRIQ) 25 MG 24 hr tablet Take 1 tablet (25 mg) by mouth  daily 90 tablet 3     mometasone (ELOCON) 0.1 % external cream Apply topically as needed       QUEtiapine (SEROQUEL) 100 MG tablet Take 100 mg by mouth       QUEtiapine (SEROQUEL) 25 MG tablet Take 25 mg by mouth       QUEtiapine (SEROQUEL) 50 MG tablet Take 1 tablet (50 mg) by mouth At Bedtime (9pm) 90 tablet 3     rivastigmine (EXELON) 4.6 MG/24HR 24 hr patch Start 1st April using 4.6mg patch daily for one month then increase to 9.5mg patch daily 30 patch 11     rivastigmine (EXELON) 9.5 MG/24HR 24 hr patch Start 1st April using 4.6mg patch daily for one month then increase to 9.5mg patch daily 30 patch 11     rOPINIRole (REQUIP ER) 2 MG 24 hr tablet 2mg tab by mouth every morning @ 8/9 am 30 tablet 5     senna-docusate (SENOKOT-S/PERICOLACE) 8.6-50 MG tablet Take 1 tablet by mouth       tamsulosin (FLOMAX) 0.4 MG capsule Take 0.4 mg by mouth       timolol maleate (TIMOPTIC) 0.5 % ophthalmic solution 1 drop right eye twice daily @ 9am and 9pm and 1 drop left eye only in morning at 9am           Medications                                                                                                                                                                                                                   Unknown if ever smoked

## 2021-06-10 ENCOUNTER — TELEPHONE (OUTPATIENT)
Dept: NEUROLOGY | Facility: CLINIC | Age: 66
End: 2021-06-10

## 2021-06-10 NOTE — TELEPHONE ENCOUNTER
MTM referral from: [facility] visit (referral by provider)    MTM referral outreach attempt #2 on Fatuma 10, 2021 at 10:44 AM      Outcome: Patient not reachable after several attempts, will route to MTM Pharmacist/Provider as an FYI. Thank you for the referral.    Abiodun Beltran, MTM coordinator

## 2021-06-14 NOTE — TELEPHONE ENCOUNTER
Updated P/A Approval Letter. The different information is the P/A is approved for a New Location (Pharmacy). Previous approval was for [facility]. The approval is now for [facility].  It comes along with a new P/A# that is needed for processing. Pharmacy is aware, as they are who initiated the change.     P/A# [id number]  P/A effective dates: 06/11/2021-08/30/2021  P/A approved for location: [facility] [id number] [place], [place]

## 2021-06-25 ENCOUNTER — VIRTUAL VISIT (OUTPATIENT)
Dept: NEUROLOGY | Facility: CLINIC | Age: 66
End: 2021-06-25
Payer: MEDICAID

## 2021-06-25 DIAGNOSIS — G20.A1 PARKINSON DISEASE (H): Primary | ICD-10-CM

## 2021-06-25 PROCEDURE — 99214 OFFICE O/P EST MOD 30 MIN: CPT | Mod: 95 | Performed by: PSYCHIATRY & NEUROLOGY

## 2021-06-25 RX ORDER — LEVODOPA AND CARBIDOPA 245; 61.25 MG/1; MG/1
CAPSULE, EXTENDED RELEASE ORAL
Qty: 540 CAPSULE | Refills: 3 | COMMUNITY
Start: 2021-06-25 | End: 2023-01-01

## 2021-06-25 RX ORDER — TAMSULOSIN HYDROCHLORIDE 0.4 MG/1
0.4 CAPSULE ORAL DAILY
COMMUNITY
Start: 2021-06-25

## 2021-06-25 RX ORDER — CITALOPRAM HYDROBROMIDE 10 MG/1
10 TABLET ORAL DAILY
COMMUNITY
End: 2022-01-01

## 2021-06-25 RX ORDER — QUETIAPINE FUMARATE 100 MG/1
100 TABLET, FILM COATED ORAL AT BEDTIME
COMMUNITY
Start: 2021-06-25 | End: 2022-01-01

## 2021-06-25 RX ORDER — AMOXICILLIN 250 MG
1 CAPSULE ORAL 2 TIMES DAILY
COMMUNITY
Start: 2021-06-25

## 2021-06-25 RX ORDER — ASPIRIN 325 MG
325 TABLET ORAL DAILY
COMMUNITY
Start: 2021-06-25 | End: 2021-09-28

## 2021-06-25 RX ORDER — ACETAMINOPHEN 325 MG/1
650 TABLET ORAL EVERY 4 HOURS PRN
COMMUNITY

## 2021-06-25 RX ORDER — LANOLIN ALCOHOL/MO/W.PET/CERES
3 CREAM (GRAM) TOPICAL AT BEDTIME
COMMUNITY
Start: 2021-06-25 | End: 2021-09-28

## 2021-06-25 RX ORDER — QUETIAPINE FUMARATE 25 MG/1
25 TABLET, FILM COATED ORAL 2 TIMES DAILY PRN
COMMUNITY
Start: 2021-06-25 | End: 2022-01-01

## 2021-06-25 NOTE — LETTER
6/25/2021       RE: Eamon Whalen  [address]  [place] [address]     Dear Colleague,    Thank you for referring your patient, Eamon Whalen, to the [facility] NEUROLOGY CLINIC [place] [facility]. Please see a copy of my visit note below.        VIDEO VISIT    Date of Visit: June 25, 2021  Name: Eamon Whalen  Date of Birth 1955  [address]   [place] [id number] [phone] (M)  [phone] (H)  [email or web address]  Has mychart  No clear daughter has set up mychart  Marlin Treviño daughter - alejandro username 1982  [phone] mobile     Christopher Mckeon sister     younger sister in [place]  [phone]     Zully - visiting once per week to fill medications  [phone]     People involved in care.   Christopher Mckeon His sister - phone and Joe her  - [phone]  Lives a few miles away. Someone is checking on him every day.   Pee Whalen - brother     [email or web address]  [phone]     [email or web address]   Trista phone number is [phone]    Verify [phone],     [email or web address]    [address]  [address]    [facility]    Nurse Marcelluslis Graves    Sister is granted medical decision making ability today as Eamon is not able to make decisions all the time and as discussed with Eamon, his sister and nurse that she should be making decisions by default taking in account his opinion as best can be done. For now he will remain in his home with services but this may change in the future    Assessment:  (G20) Parkinson disease (H)  (primary encounter diagnosis)  Carbidopa/levodopa rytary 245mg 2 tabs 3/day  Ropinirole requip ER 2mg 24 hr daily - stopped    Bit more tremor  Bit of drooling    he has had falls - on arising and when trying to sit down  Started physical therapy yesterday      Eamon  Aileen and Christopher (sister)   Marlin and Christopher are POA - medical  Brother driss is financial POA   from [employer] is trying to keep     Weekly nursing visit  Home health aide 2/week  PCA - not sure how frequently; not M-F      Gait/Balance/Falls   He continues to be a fall risk  He has had a fall in the past week     Exercise/Therapy      Cognitive/Driving   Has confusion and dementia  Donepezil aricept 10mg daily  Rivastigmine/exelon - not using  Not always using a walker.      Mood   Citalopram celexa 10mg started a week or so ago.     [facility] providers came by     Hallucinations/delusions   Quetiapine seroquel 100mg at 9pm  Quetiapine seroquel 25mg 2/day as needed    Thinking there are students there who are smoking marijuana cigarettes and sneaking in windows  Has some (possible delusions) about lingering problems from mold.      Sleep      Bladder   Nocturia 2-6/noc  Finasteride proscar 5mg daily   mirabegron myrbetriq 25mg daily - not taking  tamsulosin flomax 0.4mg - daily    Had a recent bladder infection     GI/Constipation/GERD   Fleet enema to help his colonoscopy on 3/31/2021     ENDO   Cholecalciferol vitamin d3 50 mcg (2000 units) daily     Cardio/heart 118/78; last week was 98/65  100% oxygen; pulse 73. 975 temperature fahrenheit  Had an episode of low blood pressure  Was slumped in a chair. May have been in the 70s  Had seen his pcp and went off tamsulosin  113/80 today and has been 130s      Vision   Latanoprost xalatan 0.005% opthalmic solution both eyes at night  Timolol timoptic 0.5% ophthalmic solution 1 drop right eye twice daily and 1 drop left eye in morning     Heme   Aspirin 325mg daily      Other:  Fluticasone flonase 50 mcg/act nasal spray - not using      ACETAMINOPHEN TYLENOL 325MG as needed     Ketoconazole cream nizoral 2% cream -using   Ketoconazole shampoo 2%  - using  Mometasone elocon 0.1% cream         Medications     9am 3pm 9pm         Acetaminophen tylenol 325mg   2 As needed       ASpirin 325mg 1       Carbidopa-levodopa ER rytary 245mg 2 2 2         Cholecalciferol vitamin d3 125 mcg (5000 units)  1           Citalopram celexa 10mg 1       Donepezil aricept 10mg   1     Finasteride proscar 5mg 1           Ketoconazole nizoral 2% cream daily            Ketoconazole nizoral 2% shampoo 2-3/week            Latanoprost xalatan 0.005% opthalmic solution       Both eyes         Melatonin 3mg    1     Mirabegron myrbetriq 25mg 24 hr tablet Not taking            Mometasone elocon 0.1% cream  not taking           Quetiapine seroquel 100mg   1     Quetiapine seroquel 25mg  2/day as needed       Rivastigmine exelon 4.6mg/2r hr patch Never started       Ropinirole requip XL 2mg TB24 stopped              Senna-docusate Senokot-S 8.6-50 1  1     Tamsulosin flomax 0.4mg       1         Timolol timoptic 0.5% ophthalmic solution Both eyes      just right eye                                                      Plan:    He is doing much better with  His medication changes - still having some mood issues/behavioral problems and clearly needs ongoing care as he is unable to manage his parkinson to the degree that is required.          Medications     9am 3pm 9pm         Acetaminophen tylenol 325mg  2 As needed       ASpirin 325mg 1       Carbidopa-levodopa ER rytary 245mg 2 2 2         Cholecalciferol vitamin d3 125 mcg (5000 units)  1           Citalopram celexa 10mg 1       Donepezil aricept 10mg   1     Finasteride proscar 5mg 1           Ketoconazole nizoral 2% cream daily            Ketoconazole nizoral 2% shampoo 2-3/week            Latanoprost xalatan 0.005% opthalmic solution       Both eyes         Melatonin 3mg    1     Quetiapine seroquel 100mg   1     Quetiapine seroquel 25mg  2/day as needed       Senna-docusate Senokot-S 8.6-50 1  1     Tamsulosin flomax 0.4mg       1         Timolol timoptic 0.5% ophthalmic solution Both eyes      just right eye                                    "                  Medical Decision Making:  #  Chronic progressive medical conditions addressed  Confusion, delusions, parkinson, etc.   Review and/or interpretation of unique test or documentation from a provider outside of neurology no   Independent historian provided additional details  Yes -= nurse   Prescription drug management and review of potential side effects and/or monitoring for side effects  yes   Health impacted by social determinants of health  Yes - facility    I have reviewed the note as documented above.  This accurately captures the substance of my conversation with the patient and total time spent preparing for visit, executing visit and completing visit on the day of the visit:  30 minutes.     Video visit  Start time of video visit 3pm   End of video visit:  330pm    Edison Villasenor MD      ------------------------------------------------------------------------------------------------------------------------------------------------------------------------    Video-Visit Details    The patient has been notified of following:     \"After a review of the patient s situation, this visit was changed from an in-person visit to a video visit to reduce the risk of COVID-19 exposure.   The patient is being evaluated via a billable video visit.\"    \"This video visit will be conducted via a call between you and your physician/provider. We have found that certain health care needs can be provided without the need for an in-person physical exam.  This service lets us provide the care you need with a video conversation.  If a prescription is necessary we can send it directly to your pharmacy.  If lab work is needed we can place an order for that and you can then stop by our lab to have the test done at a later time.    If during the course of the call the physician/provider feels a video visit is not appropriate, you will not be charged for this service.\"     Patient has given verbal consent for Video visit? " Yes    Patient would like the video invitation sent by:     Type of service:  Video Visit    Video Start Time:     Video End Time (time video stopped):     Duration:  minutes - see above    Originating Location (pt. Location):     Distant Location (provider location):  [facility] NEUROLOGY [facility]IC [place]     Mode of Communication:  Video Conference via [employer] (and if not possible then doximity)      Edison Villasenor MD      --------------------------------------------------------------------------------------------------------------    Eamon Whalen is a 65 year old male who is being evaluated via a billable video visit.      Charts reviewed  Consult from  Images reviewed        I have reviewed and updated the patient's Past Medical History, Social History, Family History and Medication List.    ALLERGIES  Cats and Dogs    Lasts visit details if there was a last visit:       14 Review of systems  are negative except for   Patient Active Problem List   Diagnosis     Paralysis agitans (H)     Wears glasses     Balance problems     Constipation     GERD (gastroesophageal reflux disease)     Urinary urgency     Insomnia     Seborrhea     Lumbago     Hearing loss     Family history of Parkinson's disease     Central retinal vein occlusion     Choroidal nevus     Vision problem -- Right Eye     ED (erectile dysfunction)     Other specified glaucoma     Abnormal electrocardiogram     Bifascicular block     Prolonged Q-T interval on ECG     History of EMG     Encounter for examination for driving license     Parkinson disease (H)     Elevated prostate specific antigen (PSA)     Family history of epilepsy and other diseases of the nervous system     Gastroesophageal reflux disease     Impotence of organic origin     Other abnormalities of gait and mobility     Personal history of other medical treatment (CODE)     Benign neoplasm of choroid     Weakness     Altered mental status     Visual hallucinations      Personal history of other medical treatment     Benign prostatic hyperplasia without urinary obstruction     Lewy body dementia (H)     Repeated falls        Allergies   Allergen Reactions     Cats      Dogs      Past Surgical History:   Procedure Laterality Date     COLONOSCOPY      3/31/2021     NO HISTORY OF SURGERY       Past Medical History:   Diagnosis Date     Abnormal electrocardiogram 3/23/2017     Balance problems      Bifascicular block 3/23/2017     Central retinal vein occlusion 4/11/2014     Choroidal nevus 4/11/2014     Constipation      Elevated prostate specific antigen (PSA) 2/20/2018     Family history of Parkinson's disease      GERD (gastroesophageal reflux disease)      Glaucoma 6/4/2015     Hearing loss      History of EMG 2/20/2018    [facility]  Nerve Conduction & EMG Report  Patient:       Eamon Whalen Patient ID:    [id number] Gender:        Male YOB: 1955 Age:           62 Years 2 Months    History & Examination:  62 year old man with history of Parkinson's disease, restless leg syndrome, and paresthesias in feet and legs. Query polyneuropathy. His clinical examination      Lumbago      Parkinson's disease (H)      Prolonged Q-T interval on ECG 3/23/2017     Urinary urgency      Wears glasses      Social History     Socioeconomic History     Marital status: Single     Spouse name: Not on file     Number of children: Not on file     Years of education: Not on file     Highest education level: Not on file   Occupational History     Occupation: farm     Employer: SELF EMPLOYED.   Social Needs     Financial resource strain: Not on file     Food insecurity     Worry: Not on file     Inability: Not on file     Transportation needs     Medical: Not on file     Non-medical: Not on file   Tobacco Use     Smoking status: Never Smoker     Smokeless tobacco: Never Used     Tobacco comment: cigar sometimes   Substance and Sexual Activity      Alcohol use: Yes     Comment: occ     Drug use: No     Sexual activity: Yes     Partners: Female   Lifestyle     Physical activity     Days per week: Not on file     Minutes per session: Not on file     Stress: Not on file   Relationships     Social connections     Talks on phone: Not on file     Gets together: Not on file     Attends Roman [employer] service: Not on file     Active member of club or organization: Not on file     Attends meetings of clubs or organizations: Not on file     Relationship status: Not on file     Intimate partner violence     Fear of current or ex partner: Not on file     Emotionally abused: Not on file     Physically abused: Not on file     Forced sexual activity: Not on file   Other Topics Concern     Parent/sibling w/ CABG, MI or angioplasty before 65F 55M? Not Asked   Social History Narrative    1 daughter who is  and lives in [place]    She was pregnant with the 1st child and had some problems in the 6-7 month of pregnancy with     Baby that may have hydrocephalus as there was IUGR. The baby  after 3 months in 2011. She was encouraged her to undergo genetic testing which was negative and they are considering having more children.     He met a woman and may  a woman from [place] - who has [place] roots. 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 He met her 3 times. Her name is Tavia and lives in [facility]. They will be visiting within a day or so with her mother. She is 36 yrs old. She is not going back there.     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 Depending on the status of the main house may build new house.         [place] in his home town had parkinson    [place] lived [place] of [facility] [place] of [place] 12 miles.         This was way before pesticides        Maternal grandfather     Dax Sorenson - [place] name   "   He would have to be brought out to a rocking chair    \"hardening of there arteries.         mother     Family History   Problem Relation Age of Onset     Heart Disease Father      Dementia Mother      Other - See Comments Daughter         living in [facility]     Other - See Comments Brother      Other - See Comments Brother      Other - See Comments Sister      Other - See Comments Sister      Other - See Comments Sister      Parkinsonism Paternal Uncle      Parkinsonism Other         paternal great grand mother     Other - See Comments Grandchild         mora's son     Other - See Comments Grandchild         mora's son     Current Outpatient Medications   Medication Sig Dispense Refill     aspirin (ASA) 325 MG tablet Take 325 mg by mouth       aspirin 81 MG EC tablet 81mg tab by mouth daily @8/9am       bisacodyl (DULCOLAX) 5 MG EC tablet        carbidopa-levodopa ER (RYTARY) 61. MG CPCR per CR capsule Take 2 capsules by mouth 3 times daily       carbidopa-levodopa ER (RYTARY) 61. MG CPCR per CR capsule 3 capsules by mouth 3/day @9am, 3pm and 9pm 270 capsule 11     cholecalciferol (VITAMIN D3) 125 mcg (5000 units) capsule 125 mcg = 5000 International units by mouth of Vitamin D3 daily at 9am       cholecalciferol 50 MCG (2000 UT) CAPS Take 2,000 Units by mouth       ciprofloxacin (CIPRO) 500 MG tablet TAKE 2 TABLETS BY MOUTH 1 HOUR PRIOR TO PROCEDURE       donepezil (ARICEPT) 10 MG tablet Take 1 tablet (10 mg) by mouth At Bedtime 90 tablet 3     finasteride (PROSCAR) 5 MG tablet 5mg tab by mouth daily @8/9am 30 tablet 3     fluticasone (FLONASE) 50 MCG/ACT nasal spray Not clear if using 1 spray each nostril daily       ibuprofen (ADVIL/MOTRIN) 600 MG tablet Take 600 mg by mouth every 6 hours as needed for moderate pain       ketoconazole (NIZORAL) 2 % external cream Apply 1 Application topically       ketoconazole (NIZORAL) 2 % external cream Apply topically as needed for itching       " ketoconazole (NIZORAL) 2 % external shampoo Apply topically daily as needed for itching or irritation       latanoprost (XALATAN) 0.005 % ophthalmic solution One drop in both eyes @ 9pm 2.5 mL 1     melatonin 3 MG tablet Take 3 mg by mouth       mirabegron (MYRBETRIQ) 25 MG 24 hr tablet Take 1 tablet (25 mg) by mouth daily 90 tablet 3     mometasone (ELOCON) 0.1 % external cream Apply topically as needed       QUEtiapine (SEROQUEL) 100 MG tablet Take 100 mg by mouth       QUEtiapine (SEROQUEL) 25 MG tablet Take 25 mg by mouth       QUEtiapine (SEROQUEL) 50 MG tablet Take 1 tablet (50 mg) by mouth At Bedtime (9pm) 90 tablet 3     rivastigmine (EXELON) 4.6 MG/24HR 24 hr patch Start 1st April using 4.6mg patch daily for one month then increase to 9.5mg patch daily 30 patch 11     rivastigmine (EXELON) 9.5 MG/24HR 24 hr patch Start 1st April using 4.6mg patch daily for one month then increase to 9.5mg patch daily 30 patch 11     rOPINIRole (REQUIP ER) 2 MG 24 hr tablet 2mg tab by mouth every morning @ 8/9 am 30 tablet 5     senna-docusate (SENOKOT-S/PERICOLACE) 8.6-50 MG tablet Take 1 tablet by mouth       tamsulosin (FLOMAX) 0.4 MG capsule Take 0.4 mg by mouth       timolol maleate (TIMOPTIC) 0.5 % ophthalmic solution 1 drop right eye twice daily @ 9am and 9pm and 1 drop left eye only in morning at 9am           Jyotsna Knutson is a 65 year old who is being evaluated via a billable video visit.      How would you like to obtain your AVS? MyChart  If the video visit is dropped, the invitation should be resent by: Send to e-mail at: [email or web address]      Video Start Time:   Video-Visit Details    Type of service:  Video Visit    Video End Time:    Originating Location (pt. Location): Home    Distant  Location (provider location):  [facility] NEUROLOGY [facility]     Platform used for Video Visit: Luis Angel      Again, thank you for allowing me to participate in the care of your patient.      Sincerely,    Edison Villasenor MD

## 2021-06-25 NOTE — PATIENT INSTRUCTIONS
G20) Parkinson disease (H)  (primary encounter diagnosis)  Carbidopa/levodopa rytary 245mg 2 tabs 3/day  Ropinirole requip ER 2mg 24 hr daily - stopped    Bit more tremor  Bit of drooling    he has had falls - on arising and when trying to sit down  Started physical therapy yesterday      Aileen Knutson and Christopher (sister)   Marlin and Christopher are POA - medical  Brother driss is financial POA   from [place] is trying to keep     Weekly nursing visit  Home health aide 2/week  PCA - not sure how frequently; not M-F      Gait/Balance/Falls   He continues to be a fall risk  He has had a fall in the past week     Exercise/Therapy      Cognitive/Driving   Has confusion and dementia  Donepezil aricept 10mg daily  Rivastigmine/exelon - not using  Not always using a walker.      Mood   Citalopram celexa 10mg started a week or so ago.     [facility] providers came by     Hallucinations/delusions   Quetiapine seroquel 100mg at 9pm  Quetiapine seroquel 25mg 2/day as needed    Thinking there are students there who are smoking marijuana cigarettes and sneaking in windows  Has some (possible delusions) about lingering problems from mold.      Sleep      Bladder   Nocturia 2-6/noc  Finasteride proscar 5mg daily   mirabegron myrbetriq 25mg daily - not taking  tamsulosin flomax 0.4mg - daily    Had a recent bladder infection     GI/Constipation/GERD   Fleet enema to help his colonoscopy on 3/31/2021     ENDO   Cholecalciferol vitamin d3 50 mcg (2000 units) daily     Cardio/heart 118/78; last week was 98/65  100% oxygen; pulse 73. 975 temperature fahrenheit  Had an episode of low blood pressure  Was slumped in a chair. May have been in the 70s  Had seen his pcp and went off tamsulosin  113/80 today and has been 130s      Vision   Latanoprost xalatan 0.005% opthalmic solution both eyes at night  Timolol timoptic 0.5% ophthalmic solution 1 drop right eye twice daily and 1 drop left eye in morning     Heme   Aspirin 325mg daily       Other:  Fluticasone flonase 50 mcg/act nasal spray - not using      ACETAMINOPHEN TYLENOL 325MG as needed     Ketoconazole cream nizoral 2% cream -using   Ketoconazole shampoo 2%  - using  Mometasone elocon 0.1% cream         Medications     9am 3pm 9pm         Acetaminophen tylenol 325mg  2 As needed       ASpirin 325mg 1       Carbidopa-levodopa ER rytary 245mg 2 2 2         Cholecalciferol vitamin d3 125 mcg (5000 units)  1           Citalopram celexa 10mg 1       Donepezil aricept 10mg   1     Finasteride proscar 5mg 1           Ketoconazole nizoral 2% cream daily            Ketoconazole nizoral 2% shampoo 2-3/week            Latanoprost xalatan 0.005% opthalmic solution       Both eyes         Melatonin 3mg    1     Mirabegron myrbetriq 25mg 24 hr tablet Not taking            Mometasone elocon 0.1% cream  not taking           Quetiapine seroquel 100mg   1     Quetiapine seroquel 25mg  2/day as needed       Rivastigmine exelon 4.6mg/2r hr patch Never started       Ropinirole requip XL 2mg TB24 stopped              Senna-docusate Senokot-S 8.6-50 1  1     Tamsulosin flomax 0.4mg       1         Timolol timoptic 0.5% ophthalmic solution Both eyes      just right eye                                                      Plan:    He is doing much better with  His medication changes - still having some mood issues/behavioral problems and clearly needs ongoing care as he is unable to manage his parkinson to the degree that is required.          Medications     9am 3pm 9pm         Acetaminophen tylenol 325mg  2 As needed       ASpirin 325mg 1       Carbidopa-levodopa ER rytary 245mg 2 2 2         Cholecalciferol vitamin d3 125 mcg (5000 units)  1           Citalopram celexa 10mg 1       Donepezil aricept 10mg   1     Finasteride proscar 5mg 1           Ketoconazole nizoral 2% cream daily            Ketoconazole nizoral 2% shampoo 2-3/week            Latanoprost xalatan 0.005% opthalmic solution       Both eyes          Melatonin 3mg    1     Quetiapine seroquel 100mg   1     Quetiapine seroquel 25mg  2/day as needed       Senna-docusate Senokot-S 8.6-50 1  1     Tamsulosin flomax 0.4mg       1         Timolol timoptic 0.5% ophthalmic solution Both eyes      just right eye

## 2021-06-25 NOTE — PROGRESS NOTES
Eamon is a 65 year old who is being evaluated via a billable video visit.      How would you like to obtain your AVS? MyChart  If the video visit is dropped, the invitation should be resent by: Send to e-mail at: [email or web address]      Video Start Time:   Video-Visit Details    Type of service:  Video Visit    Video End Time:    Originating Location (pt. Location): Home    Distant Location (provider location):  [facility] NEUROLOGY CLINIC [place]     Platform used for Video Visit: [employer]

## 2021-09-17 NOTE — PROGRESS NOTES
VIDEO VISIT    Date of Visit: September 28, 2021  Name: Eamon Whalen  Date of Birth 1955    [address]   [place] [id number] [phone] (M)  [phone] (H)  [email or web address]  Has mychart  No clear daughter has set up mychart  Marlin Treviño daughter - alejandro username 1982  [phone] mobile     Christopher Mckeon sister     younger sister in [place]  [phone]     Zully - visiting once per week to fill medications  [phone]     People involved in care.   Christopher Mckeon His sister - phone and Joe her  - [phone]  Lives a few miles away. Someone is checking on him every day.   Pee Brigidode - brother     [email or web address] [email or web address]  [phone]     [email or web address]   Trista phone number is [phone]     Verify [phone],      [email or web address]     [address]  [address]     [facility] physicians  KAYY Teran, VERONICA  [email or web address]  [facility], [facility]  [facility]     Nurse Marcelluslis Star     Sister is granted medical decision making ability today as Eamon is not able to make decisions all the time and as discussed with Eamon, his sister and nurse that she should be making decisions by default taking in account his opinion as best can be done. For now he will remain in his home with services but this may change in the future    Verify [phone] ext [phone], email link to [email or web address]    Assessment:  (G20) Parkinson disease (H)  (primary encounter diagnosis) - duration at least 13 years  Carbidopa/levodopa rytary 245mg 2 tabs 3/day at 8am, 2pm and 8pm  Ropinirole requip ER 2mg 24 hr daily - stopped     Bit more tremor  Bit of drooling     he has had falls - on arising and when trying to sit down  Started physical therapy yesterday      Jose Alfredo Knutson and Christopher (sister)    Marlin and Christopher are POA - medical  Brother driss is financial POA   from [place]      Weekly nursing visit  Home health aide 2/week  PCA - not sure how frequently; not M-F      Gait/Balance/Falls   He continues to be a fall risk     Exercise/Therapy      Cognitive/Driving   Has confusion and dementia  Donepezil aricept 10mg daily  Rivastigmine/exelon - not using  Not always using a walker.      Mood   Citalopram celexa 10mg      [facility] providers came by     Hallucinations/delusions   Quetiapine seroquel 100mg at 9pm  Quetiapine seroquel 25mg 2/day as needed     Thinking there are students there who are smoking marijuana cigarettes and sneaking in windows  Has some (possible delusions) about lingering problems from mold.     seroquel working and they are managing the delusions with distraction and calming strategies, etc.   Interventions are helpful      Sleep   Melatonin 5mg at bedtime     Bladder   Nocturia 2-6/noc  Finasteride proscar 5mg daily   mirabegron myrbetriq 25mg daily - not taking  tamsulosin flomax 0.4mg - daily     Had a recent bladder infection     GI/Constipation/GERD   Fleet enema to help his colonoscopy on 3/31/2021     ENDO   Cholecalciferol vitamin d3 50 mcg (2000 units) daily     Cardio/heart 118/78; last week was 98/65  100% oxygen; pulse 73. 975 temperature fahrenheit  Had an episode of low blood pressure  Was slumped in a chair. May have been in the 70s  Had seen his pcp and went off tamsulosin  113/80 today and has been 130s      Vision   Latanoprost xalatan 0.005% opthalmic solution both eyes at night  Timolol timoptic 0.5% ophthalmic solution 1 drop right eye twice daily and 1 drop left eye in morning     Heme   Aspirin 81mg daily     Other:  Fluticasone flonase 50 mcg/act nasal spray - not using      ACETAMINOPHEN TYLENOL 325MG as needed     Ketoconazole cream nizoral 2% cream -using   Ketoconazole shampoo 2%  - using  Mometasone elocon 0.1%  cream          Medications     9am 3pm 9pm      Acetaminophen tylenol 325mg  2 As needed         Carbidopa-levodopa ER rytary 245mg 2 2 2      Cholecalciferol vitamin d3 50 mcg (2000 units) See below         Citalopram celexa 10mg 1         Donepezil aricept 10mg     1     Finasteride proscar 5mg 1         Ketoconazole nizoral 2% cream daily          Ketoconazole nizoral 2% shampoo 2-3/week          Latanoprost xalatan 0.005% opthalmic solution       Both eyes      Melatonin 5mg   1    Quetiapine seroquel 100mg     1     Quetiapine seroquel 25mg  2/day as needed         Senna-docusate Senokot-S 8.6-50 1   1     SM ASpirin 81mg low dose 1      Tamsulosin flomax 0.4mg        1      Timolol timoptic 0.5% ophthalmic solution Both eyes    right eye      Vitamin D3 10mcg 400 units 5            Vitamin D3 25mcg 1000 units  see above                                          Plan:    Would not increase his rytary as it may increase the hallucinations/delusions without great motor benefit.    He is getting citalopram from Lesa Randle ([facility]adeen) and he may want to try a bit more of this dose. He is on 10mg and could possibly go to 15mg or 20mg.     APOORVA Young, VERONICA  [email or web address]  [facility], [facility]  [facility]    Delusions are being managed with medications and interventions.   Spent a fair amount of time trying to redirect and reassure him as he was fixed on discussing rytary. He appears medically stable at this point.     Return back to see Roshni Gonzales virtual visit in 3-6 months    Medical Decision Making:  #  Chronic progressive medical conditions addressed  Yes delusion  Review and/or interpretation of unique test or documentation from a provider outside of neurology no   Independent historian provided additional details  Yes - nurse   Prescription drug management and review of potential side effects and/or monitoring for side effects   "yes   Health impacted by social determinants of health  Yes - facility.     I have reviewed the note as documented above.  This accurately captures the substance of my conversation with the patient and total time spent preparing for visit, executing visit and completing visit on the day of the visit:  30 minutes.     Video visit: 1037am - 11am        Edison Villasenor MD      ------------------------------------------------------------------------------------------------------------------------------------------------------------------------    Video-Visit Details    The patient has been notified of following:     \"After a review of the patient s situation, this visit was changed from an in-person visit to a video visit to reduce the risk of COVID-19 exposure.   The patient is being evaluated via a billable video visit.\"    \"This video visit will be conducted via a call between you and your physician/provider. We have found that certain health care needs can be provided without the need for an in-person physical exam.  This service lets us provide the care you need with a video conversation.  If a prescription is necessary we can send it directly to your pharmacy.  If lab work is needed we can place an order for that and you can then stop by our lab to have the test done at a later time.    If during the course of the call the physician/provider feels a video visit is not appropriate, you will not be charged for this service.\"     Patient has given verbal consent for Video visit? Yes    Patient would like the video invitation sent by:     Type of service:  Video Visit    Video Start Time:     Video End Time (time video stopped):     Duration:  minutes - see above    Originating Location (pt. Location):     Distant Location (provider location):  [facility] NEUROLOGY [facility]     Mode of Communication:  Video Conference via [employer] (and if not possible then doximity)      Edison Villasenor" MD      --------------------------------------------------------------------------------------------------------------    Eamon Whalen is a 65 year old male who is being evaluated via a billable video visit.      Charts reviewed  Consult from  Images reviewed        I have reviewed and updated the patient's Past Medical History, Social History, Family History and Medication List.    ALLERGIES  Cats and Dogs    Lasts visit details if there was a last visit:       14 Review of systems  are negative except for   Patient Active Problem List   Diagnosis     Paralysis agitans (H)     Wears glasses     Balance problems     Constipation     GERD (gastroesophageal reflux disease)     Urinary urgency     Insomnia     Seborrhea     Lumbago     Hearing loss     Family history of Parkinson's disease     Central retinal vein occlusion     Choroidal nevus     Vision problem -- Right Eye     ED (erectile dysfunction)     Other specified glaucoma     Abnormal electrocardiogram     Bifascicular block     Prolonged Q-T interval on ECG     History of EMG     Encounter for examination for driving license     Parkinson disease (H)     Elevated prostate specific antigen (PSA)     Family history of epilepsy and other diseases of the nervous system     Gastroesophageal reflux disease     Impotence of organic origin     Other abnormalities of gait and mobility     Personal history of other medical treatment (CODE)     Benign neoplasm of choroid     Weakness     Altered mental status     Visual hallucinations     Personal history of other medical treatment     Benign prostatic hyperplasia without urinary obstruction     Lewy body dementia (H)     Repeated falls        Allergies   Allergen Reactions     Cats      Dogs      Past Surgical History:   Procedure Laterality Date     COLONOSCOPY      3/31/2021     NO HISTORY OF SURGERY       Past Medical History:   Diagnosis Date     Abnormal electrocardiogram 3/23/2017     Balance problems       Bifascicular block 3/23/2017     Central retinal vein occlusion 2014     Choroidal nevus 2014     Constipation      Elevated prostate specific antigen (PSA) 2018     Family history of Parkinson's disease      GERD (gastroesophageal reflux disease)      Glaucoma 2015     Hearing loss      History of EMG 2018    [facility]  Nerve Conduction & EMG Report  Patient:       Eamon Whalen Patient ID:    [id number] Gender:        Male YOB: 1955 Age:           62 Years 2 Months    History & Examination:  62 year old man with history of Parkinson's disease, restless leg syndrome, and paresthesias in feet and legs. Query polyneuropathy. His clinical examination      Lumbago      Parkinson's disease (H)      Prolonged Q-T interval on ECG 3/23/2017     Urinary urgency      Wears glasses      Social History     Socioeconomic History     Marital status: Single     Spouse name: Not on file     Number of children: Not on file     Years of education: Not on file     Highest education level: Not on file   Occupational History     Occupation: farm     Employer: SELF EMPLOYED.   Tobacco Use     Smoking status: Never Smoker     Smokeless tobacco: Never Used     Tobacco comment: cigar sometimes   Substance and Sexual Activity     Alcohol use: Yes     Comment: occ     Drug use: No     Sexual activity: Yes     Partners: Female   Other Topics Concern     Parent/sibling w/ CABG, MI or angioplasty before 65F 55M? Not Asked   Social History Narrative    1 daughter who is  and lives in [place]    She was pregnant with the 1st child and had some problems in the 6-7 month of pregnancy with     Baby that may have hydrocephalus as there was IUGR. The baby  after 3 months in 2011. She was encouraged her to undergo genetic testing which was negative and they are considering having more children.     He met a woman and may  a woman from [place] - who has  "Swazi roots. 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 He met her 3 times. Her name is Tavia and lives in [facility]. They will be visiting within a day or so with her mother. She is 36 yrs old. She is not going back there.     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 Depending on the status of the main house may build new house.         norwesolit[place]ians in his home town had parkinson    Germans lived on the other side of town        [facility] -- [place] of [place] 12 miles.         This was way before pesticides        Maternal grandfather     Dax Sorenson - Tunisian name     He would have to be brought out to a rocking chair    \"hardening of there arteries.         mother     Social Determinants of Health     Financial Resource Strain:      Difficulty of Paying Living Expenses:    Food Insecurity:      Worried About Running Out of Food in the Last Year:      Ran Out of Food in the Last Year:    Transportation Needs:      Lack of Transportation (Medical):      Lack of Transportation (Non-Medical):    Physical Activity:      Days of Exercise per Week:      Minutes of Exercise per Session:    Stress:      Feeling of Stress :    Social Connections:      Frequency of Communication with Friends and Family:      Frequency of Social Gatherings with Friends and Family:      Attends Judaism Services:      Active Member of Clubs or Organizations:      Attends Club or Organization Meetings:      Marital Status:    Intimate Partner Violence:      Fear of Current or Ex-Partner:      Emotionally Abused:      Physically Abused:      Sexually Abused:      Family History   Problem Relation Age of Onset     Heart Disease Father      Dementia Mother      Other - See Comments Daughter         living in [facility]     Other - See Comments Brother      Other - See Comments Brother      Other - See Comments Sister  "     Other - See Comments Sister      Other - See Comments Sister      Parkinsonism Paternal Uncle      Parkinsonism Other         paternal great grand mother     Other - See Comments Grandchild         mora's son     Other - See Comments Grandchild         mora's son     Current Outpatient Medications   Medication Sig Dispense Refill     acetaminophen (TYLENOL) 325 MG tablet Take 650 mg by mouth every 4 hours as needed for mild pain       aspirin (ASA) 325 MG tablet Take 1 tablet (325 mg) by mouth daily       carbidopa-levodopa ER (RYTARY) 61. MG CPCR per CR capsule 2 capsules by mouth 3/day at 8am, 2pm and 8pm = 6/day 540 capsule 3     cholecalciferol 50 MCG (2000 UT) CAPS Take 2,000 Units by mouth       citalopram (CELEXA) 10 MG tablet Take 10 mg by mouth daily       donepezil (ARICEPT) 10 MG tablet Take 1 tablet (10 mg) by mouth At Bedtime 90 tablet 3     finasteride (PROSCAR) 5 MG tablet 5mg tab by mouth daily @8/9am 30 tablet 3     ketoconazole (NIZORAL) 2 % external cream Apply topically as needed for itching       ketoconazole (NIZORAL) 2 % external shampoo Apply topically daily as needed for itching or irritation       latanoprost (XALATAN) 0.005 % ophthalmic solution One drop in both eyes @ 9pm 2.5 mL 1     melatonin 3 MG tablet Take 1 tablet (3 mg) by mouth At Bedtime       QUEtiapine (SEROQUEL) 100 MG tablet Take 1 tablet (100 mg) by mouth At Bedtime       QUEtiapine (SEROQUEL) 25 MG tablet Take 25 mg by mouth 2 times daily as needed        senna-docusate (SENOKOT-S/PERICOLACE) 8.6-50 MG tablet Take 1 tablet by mouth 2 times daily       tamsulosin (FLOMAX) 0.4 MG capsule Take 1 capsule (0.4 mg) by mouth daily       timolol maleate (TIMOPTIC) 0.5 % ophthalmic solution 1 drop right eye twice daily @ 9am and 9pm and 1 drop left eye only in morning at 9am           Medications

## 2021-09-19 ENCOUNTER — HEALTH MAINTENANCE LETTER (OUTPATIENT)
Age: 66
End: 2021-09-19

## 2021-09-28 ENCOUNTER — VIRTUAL VISIT (OUTPATIENT)
Dept: NEUROLOGY | Facility: CLINIC | Age: 66
End: 2021-09-28
Payer: MEDICAID

## 2021-09-28 DIAGNOSIS — G20.A1 PARKINSON DISEASE (H): Primary | ICD-10-CM

## 2021-09-28 PROCEDURE — 99214 OFFICE O/P EST MOD 30 MIN: CPT | Mod: 95 | Performed by: PSYCHIATRY & NEUROLOGY

## 2021-09-28 RX ORDER — OMEGA-3S/DHA/EPA/FISH OIL/D3 300MG-1000
CAPSULE ORAL
COMMUNITY
Start: 2021-08-31

## 2021-09-28 RX ORDER — ASPIRIN 81 MG/1
TABLET, DELAYED RELEASE ORAL
COMMUNITY
Start: 2021-08-31 | End: 2022-01-01

## 2021-09-28 RX ORDER — CHOLECALCIFEROL (VITAMIN D3) 25 MCG
TABLET ORAL
COMMUNITY
Start: 2021-07-23 | End: 2021-09-28

## 2021-09-28 NOTE — PATIENT INSTRUCTIONS
Assessment:  (G20) Parkinson disease (H)  (primary encounter diagnosis) - duration at least 13 years  Carbidopa/levodopa rytary 245mg 2 tabs 3/day at 8am, 2pm and 8pm  Ropinirole requip ER 2mg 24 hr daily - stopped     Bit more tremor  Bit of drooling     he has had falls - on arising and when trying to sit down  Started physical therapy yesterday      Eamon, Aileen and Christopher (sister)   Marlin and Christopher are POA - medical  Brother driss is financial POA   from [place]      Weekly nursing visit  Home health aide 2/week  PCA - not sure how frequently; not M-F      Gait/Balance/Falls   He continues to be a fall risk     Exercise/Therapy      Cognitive/Driving   Has confusion and dementia  Donepezil aricept 10mg daily  Rivastigmine/exelon - not using  Not always using a walker.      Mood   Citalopram celexa 10mg      [facility] providers came by     Hallucinations/delusions   Quetiapine seroquel 100mg at 9pm  Quetiapine seroquel 25mg 2/day as needed     Thinking there are students there who are smoking marijuana cigarettes and sneaking in windows  Has some (possible delusions) about lingering problems from mold.     seroquel working and they are managing the delusions with distraction and calming strategies, etc.   Interventions are helpful      Sleep   Melatonin 5mg at bedtime     Bladder   Nocturia 2-6/noc  Finasteride proscar 5mg daily   mirabegron myrbetriq 25mg daily - not taking  tamsulosin flomax 0.4mg - daily     Had a recent bladder infection     GI/Constipation/GERD   Fleet enema to help his colonoscopy on 3/31/2021     ENDO   Cholecalciferol vitamin d3 50 mcg (2000 units) daily     Cardio/heart 118/78; last week was 98/65  100% oxygen; pulse 73. 975 temperature fahrenheit  Had an episode of low blood pressure  Was slumped in a chair. May have been in the 70s  Had seen his pcp and went off tamsulosin  113/80 today and has been 130s      Vision   Latanoprost xalatan 0.005% opthalmic solution both  eyes at night  Timolol timoptic 0.5% ophthalmic solution 1 drop right eye twice daily and 1 drop left eye in morning     Heme   Aspirin 81mg daily     Other:  Fluticasone flonase 50 mcg/act nasal spray - not using      ACETAMINOPHEN TYLENOL 325MG as needed     Ketoconazole cream nizoral 2% cream -using   Ketoconazole shampoo 2%  - using  Mometasone elocon 0.1% cream          Medications     9am 3pm 9pm      Acetaminophen tylenol 325mg  2 As needed         Carbidopa-levodopa ER rytary 245mg 2 2 2      Cholecalciferol vitamin d3 50 mcg (2000 units) See below         Citalopram celexa 10mg 1         Donepezil aricept 10mg     1     Finasteride proscar 5mg 1         Ketoconazole nizoral 2% cream daily          Ketoconazole nizoral 2% shampoo 2-3/week          Latanoprost xalatan 0.005% opthalmic solution       Both eyes      Melatonin 5mg   1    Quetiapine seroquel 100mg     1     Quetiapine seroquel 25mg  2/day as needed         Senna-docusate Senokot-S 8.6-50 1   1     SM ASpirin 81mg low dose 1      Tamsulosin flomax 0.4mg        1      Timolol timoptic 0.5% ophthalmic solution Both eyes    right eye      Vitamin D3 10mcg 400 units 5            Vitamin D3 25mcg 1000 units  see above                                          Plan:    Would not increase his rytary as it may increase the hallucinations/delusions without great motor benefit.    He is getting citalopram from Lesa Randle ([facility]) and he may want to try a bit more of this dose. He is on 10mg and could possibly go to 15mg or 20mg.     APOORVA Young, VERONICA  [email or web address]  [facility], [facility]  [facility]    Delusions are being managed with medications and interventions.   Spent a fair amount of time trying to redirect and reassure him as he was fixed on discussing rytary. He appears medically stable at this point.     Return back to see Roshni Gonzales [facility] visit in 3-6 months

## 2021-09-28 NOTE — LETTER
9/28/2021       RE: Eamon Whalen  [address]  [place] [address]     Dear Colleague,    Thank you for referring your patient, Eamon Whalen, to the [facility] [place] at [facility]. Please see a copy of my visit note below.        VIDEO VISIT    Date of Visit: September 28, 2021  Name: Eamon Whalen  Date of Birth 1955    [address]   [place] [id number] [phone] (M)  [phone] (H)  [email or web address]  Has mychart  No clear daughter has set up mychart  Marlin Treviño daughter - alejandro username 1982  [phone] mobile     Christopher Mckeon sister     younger sister in [place]  [phone]     Zully - visiting once per week to fill medications  [phone]     People involved in care.   Christopher Mckeon His sister - phone and Joe her  - [phone]  Lives a few miles away. Someone is checking on him every day.   Pee Whalen - brother     jose [email or web address]  [phone]     [email or web address]   Trista phone number is [phone]     Verify [phone],      [email or web address]     [address]  [address]     [facility] physicians  KAYY Teran, CNP  [email or web address]  [facility], Primary Care  [facility]     Nurse Marcelluslis Marceloarcadio     Sister is granted medical decision making ability today as Eamon is not able to make decisions all the time and as discussed with Eamon, his sister and nurse that she should be making decisions by default taking in account his opinion as best can be done. For now he will remain in his home with services but this may change in the future    Verify [phone] ext [phone], email link to [email or web address]    Assessment:  (G20) Parkinson disease (H)  (primary encounter diagnosis) -  duration at least 13 years  Carbidopa/levodopa rytary 245mg 2 tabs 3/day at 8am, 2pm and 8pm  Ropinirole requip ER 2mg 24 hr daily - stopped     Bit more tremor  Bit of drooling     he has had falls - on arising and when trying to sit down  Started physical therapy yesterday      Aileen Knutson and Christopher (sister)   Marlin and Christopher are POA - medical  Brother driss is financial POA   from [place]      Weekly nursing visit  Home health aide 2/week  PCA - not sure how frequently; not M-F      Gait/Balance/Falls   He continues to be a fall risk     Exercise/Therapy      Cognitive/Driving   Has confusion and dementia  Donepezil aricept 10mg daily  Rivastigmine/exelon - not using  Not always using a walker.      Mood   Citalopram celexa 10mg      [facility] care providers came by     Hallucinations/delusions   Quetiapine seroquel 100mg at 9pm  Quetiapine seroquel 25mg 2/day as needed     Thinking there are students there who are smoking marijuana cigarettes and sneaking in windows  Has some (possible delusions) about lingering problems from mold.     seroquel working and they are managing the delusions with distraction and calming strategies, etc.   Interventions are helpful      Sleep   Melatonin 5mg at bedtime     Bladder   Nocturia 2-6/noc  Finasteride proscar 5mg daily   mirabegron myrbetriq 25mg daily - not taking  tamsulosin flomax 0.4mg - daily     Had a recent bladder infection     GI/Constipation/GERD   Fleet enema to help his colonoscopy on 3/31/2021     ENDO   Cholecalciferol vitamin d3 50 mcg (2000 units) daily     Cardio/heart 118/78; last week was 98/65  100% oxygen; [facility] 73. [facility]  Had an episode of low blood pressure  Was slumped in a chair. May have been in the 70s  Had seen his pcp and went off tamsulosin  113/80 today and has been 130s      Vision   Latanoprost xalatan 0.005% opthalmic solution both eyes at night  Timolol timoptic 0.5% ophthalmic solution 1 drop right eye  twice daily and 1 drop left eye in morning     Heme   Aspirin 81mg daily     Other:  Fluticasone flonase 50 mcg/act nasal spray - not using      ACETAMINOPHEN TYLENOL 325MG as needed     Ketoconazole cream nizoral 2% cream -using   Ketoconazole shampoo 2%  - using  Mometasone elocon 0.1% cream          Medications     9am 3pm 9pm      Acetaminophen tylenol 325mg  2 As needed         Carbidopa-levodopa ER rytary 245mg 2 2 2      Cholecalciferol vitamin d3 50 mcg (2000 units) See below         Citalopram celexa 10mg 1         Donepezil aricept 10mg     1     Finasteride proscar 5mg 1         Ketoconazole nizoral 2% cream daily          Ketoconazole nizoral 2% shampoo 2-3/week          Latanoprost xalatan 0.005% opthalmic solution       Both eyes      Melatonin 5mg   1    Quetiapine seroquel 100mg     1     Quetiapine seroquel 25mg  2/day as needed         Senna-docusate Senokot-S 8.6-50 1   1     SM ASpirin 81mg low dose 1      Tamsulosin flomax 0.4mg        1      Timolol timoptic 0.5% ophthalmic solution Both eyes    right eye      Vitamin D3 10mcg 400 units 5            Vitamin D3 25mcg 1000 units  see above                                          Plan:    Would not increase his rytary as it may increase the hallucinations/delusions without great motor benefit.    He is getting citalopram from Lesa Randle (Nadeen) and he may want to try a bit more of this dose. He is on 10mg and could possibly go to 15mg or 20mg.     APOORVA Young, VERONICA  [email or web address]  [facility], Primary Care  [place] [facility]    Delusions are being managed with medications and interventions.   Spent a fair amount of time trying to redirect and reassure him as he was fixed on discussing rytary. He appears medically stable at this point.     Return back to see Roshni Gonzales virtual visit in 3-6 months    Medical Decision Making:  #  Chronic progressive medical conditions  "addressed  Yes delusion  Review and/or interpretation of unique test or documentation from a provider outside of neurology no   Independent historian provided additional details  Yes - nurse   Prescription drug management and review of potential side effects and/or monitoring for side effects  yes   Health impacted by social determinants of health  Yes - facility.     I have reviewed the note as documented above.  This accurately captures the substance of my conversation with the patient and total time spent preparing for visit, executing visit and completing visit on the day of the visit:  30 minutes.     Video visit: 1037am - 11am        Edison Villasenor MD      ------------------------------------------------------------------------------------------------------------------------------------------------------------------------    Video-Visit Details    The patient has been notified of following:     \"After a review of the patient s situation, this visit was changed from an in-person visit to a video visit to reduce the risk of COVID-19 exposure.   The patient is being evaluated via a billable video visit.\"    \"This video visit will be conducted via a call between you and your physician/provider. We have found that certain health care needs can be provided without the need for an in-person physical exam.  This service lets us provide the care you need with a video conversation.  If a prescription is necessary we can send it directly to your pharmacy.  If lab work is needed we can place an order for that and you can then stop by our lab to have the test done at a later time.    If during the course of the call the physician/provider feels a video visit is not appropriate, you will not be charged for this service.\"     Patient has given verbal consent for Video visit? Yes    Patient would like the video invitation sent by:     Type of service:  Video Visit    Video Start Time:     Video End Time (time video stopped): "     Duration:  minutes - see above    Originating Location (pt. Location):     Distant Location (provider location):  [facility] NEUROLOGY CLINIC [place]     Mode of Communication:  Video Conference via [employer] (and if not possible then doximity)      Edison Villasenor MD      --------------------------------------------------------------------------------------------------------------    Eamon Whalen is a 65 year old male who is being evaluated via a billable video visit.      Charts reviewed  Consult from  Images reviewed        I have reviewed and updated the patient's Past Medical History, Social History, Family History and Medication List.    ALLERGIES  Cats and Dogs    Lasts visit details if there was a last visit:       14 Review of systems  are negative except for   Patient Active Problem List   Diagnosis     Paralysis agitans (H)     Wears glasses     Balance problems     Constipation     GERD (gastroesophageal reflux disease)     Urinary urgency     Insomnia     Seborrhea     Lumbago     Hearing loss     Family history of Parkinson's disease     Central retinal vein occlusion     Choroidal nevus     Vision problem -- Right Eye     ED (erectile dysfunction)     Other specified glaucoma     Abnormal electrocardiogram     Bifascicular block     Prolonged Q-T interval on ECG     History of EMG     Encounter for examination for driving license     Parkinson disease (H)     Elevated prostate specific antigen (PSA)     Family history of epilepsy and other diseases of the nervous system     Gastroesophageal reflux disease     Impotence of organic origin     Other abnormalities of gait and mobility     Personal history of other medical treatment (CODE)     Benign neoplasm of choroid     Weakness     Altered mental status     Visual hallucinations     Personal history of other medical treatment     Benign prostatic hyperplasia without urinary obstruction     Lewy body dementia (H)     Repeated falls         Allergies   Allergen Reactions     Cats      Dogs      Past Surgical History:   Procedure Laterality Date     COLONOSCOPY      3/31/2021     NO HISTORY OF SURGERY       Past Medical History:   Diagnosis Date     Abnormal electrocardiogram 3/23/2017     Balance problems      Bifascicular block 3/23/2017     Central retinal vein occlusion 4/11/2014     Choroidal nevus 4/11/2014     Constipation      Elevated prostate specific antigen (PSA) 2/20/2018     Family history of Parkinson's disease      GERD (gastroesophageal reflux disease)      Glaucoma 6/4/2015     Hearing loss      History of EMG 2/20/2018    [facility]  Nerve Conduction & EMG Report  Patient:       Eamon Whalen Patient ID:    [id number] Gender:        Male YOB: 1955 Age:           62 Years 2 Months    History & Examination:  62 year old man with history of Parkinson's disease, restless leg syndrome, and paresthesias in feet and legs. Query polyneuropathy. His clinical examination      Lumbago      Parkinson's disease (H)      Prolonged Q-T interval on ECG 3/23/2017     Urinary urgency      Wears glasses      Social History     Socioeconomic History     Marital status: Single     Spouse name: Not on file     Number of children: Not on file     Years of education: Not on file     Highest education level: Not on file   Occupational History     Occupation: farm     Employer: SELF EMPLOYED.   Tobacco Use     Smoking status: Never Smoker     Smokeless tobacco: Never Used     Tobacco comment: cigar sometimes   Substance and Sexual Activity     Alcohol use: Yes     Comment: occ     Drug use: No     Sexual activity: Yes     Partners: Female   Other Topics Concern     Parent/sibling w/ CABG, MI or angioplasty before 65F 55M? Not Asked   Social History Narrative    1 daughter who is  and lives in [place]    She was pregnant with the 1st child and had some problems in the 6-7 month of pregnancy with      "Baby that may have hydrocephalus as there was IUGR. The baby  after 3 months in 2011. She was encouraged her to undergo genetic testing which was negative and they are considering having more children.     He met a woman and may  a woman from [place] - who has Nigerian roots. 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 He met her 3 times. Her name is Tavia and lives in [facility]. They will be visiting within a day or so with her mother. She is 36 yrs old. She is not going back there.     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 Depending on the status of the main house may build new house.         norweigians in his home town had parkinson    [place] lived [place] side of [facility] [place].         This was way before pesticides        Maternal grandfather     Dax Sorenson - Nepalese name     He would have to be brought out to a rocking chair    \"hardening of there arteries.         mother     Social Determinants of Health     Financial Resource Strain:      Difficulty of Paying Living Expenses:    Food Insecurity:      Worried About Running Out of Food in the Last Year:      Ran Out of Food in the Last Year:    Transportation Needs:      Lack of Transportation (Medical):      Lack of Transportation (Non-Medical):    Physical Activity:      Days of Exercise per Week:      Minutes of Exercise per Session:    Stress:      Feeling of Stress :    Social Connections:      Frequency of Communication with Friends and Family:      Frequency of Social Gatherings with Friends and Family:      Attends [employer] Services:      Active Member of Clubs or Organizations:      Attends Club or Organization Meetings:      Marital Status:    Intimate Partner Violence:      Fear of Current or Ex-Partner:      Emotionally Abused:      Physically Abused:      Sexually " Abused:      Family History   Problem Relation Age of Onset     Heart Disease Father      Dementia Mother      Other - See Comments Daughter         living in [facility]     Other - See Comments Brother      Other - See Comments Brother      Other - See Comments Sister      Other - See Comments Sister      Other - See Comments Sister      Parkinsonism Paternal Uncle      Parkinsonism Other         paternal great grand mother     Other - See Comments Grandchild         mora's son     Other - See Comments Grandchild         mora's son     Current Outpatient Medications   Medication Sig Dispense Refill     acetaminophen (TYLENOL) 325 MG tablet Take 650 mg by mouth every 4 hours as needed for mild pain       aspirin (ASA) 325 MG tablet Take 1 tablet (325 mg) by mouth daily       carbidopa-levodopa ER (RYTARY) 61. MG CPCR per CR capsule 2 capsules by mouth 3/day at 8am, 2pm and 8pm = 6/day 540 capsule 3     cholecalciferol 50 MCG (2000 UT) CAPS Take 2,000 Units by mouth       citalopram (CELEXA) 10 MG tablet Take 10 mg by mouth daily       donepezil (ARICEPT) 10 MG tablet Take 1 tablet (10 mg) by mouth At Bedtime 90 tablet 3     finasteride (PROSCAR) 5 MG tablet 5mg tab by mouth daily @8/9am 30 tablet 3     ketoconazole (NIZORAL) 2 % external cream Apply topically as needed for itching       ketoconazole (NIZORAL) 2 % external shampoo Apply topically daily as needed for itching or irritation       latanoprost (XALATAN) 0.005 % ophthalmic solution One drop in both eyes @ 9pm 2.5 mL 1     melatonin 3 MG tablet Take 1 tablet (3 mg) by mouth At Bedtime       QUEtiapine (SEROQUEL) 100 MG tablet Take 1 tablet (100 mg) by mouth At Bedtime       QUEtiapine (SEROQUEL) 25 MG tablet Take 25 mg by mouth 2 times daily as needed        senna-docusate (SENOKOT-S/PERICOLACE) 8.6-50 MG tablet Take 1 tablet by mouth 2 times daily       tamsulosin (FLOMAX) 0.4 MG capsule Take 1 capsule (0.4 mg) by mouth daily       timolol  maleate (TIMOPTIC) 0.5 % ophthalmic solution 1 drop right eye twice daily @ 9am and 9pm and 1 drop left eye only in morning at 9am           Medications                                                                                                                                                                                                                Again, thank you for allowing me to participate in the care of your patient.      Sincerely,    Edison Villasenor MD

## 2022-01-01 ENCOUNTER — TELEPHONE (OUTPATIENT)
Dept: NEUROLOGY | Facility: CLINIC | Age: 67
End: 2022-01-01
Payer: COMMERCIAL

## 2022-01-01 ENCOUNTER — MEDICAL CORRESPONDENCE (OUTPATIENT)
Dept: HEALTH INFORMATION MANAGEMENT | Facility: CLINIC | Age: 67
End: 2022-01-01

## 2022-01-01 ENCOUNTER — VIRTUAL VISIT (OUTPATIENT)
Dept: NEUROLOGY | Facility: CLINIC | Age: 67
End: 2022-01-01
Payer: COMMERCIAL

## 2022-01-01 ENCOUNTER — TELEPHONE (OUTPATIENT)
Dept: NEUROLOGY | Facility: CLINIC | Age: 67
End: 2022-01-01

## 2022-01-01 ENCOUNTER — DOCUMENTATION ONLY (OUTPATIENT)
Dept: NEUROLOGY | Facility: CLINIC | Age: 67
End: 2022-01-01

## 2022-01-01 ENCOUNTER — MEDICAL CORRESPONDENCE (OUTPATIENT)
Dept: HEALTH INFORMATION MANAGEMENT | Facility: CLINIC | Age: 67
End: 2022-01-01
Payer: COMMERCIAL

## 2022-01-01 ENCOUNTER — MEDICAL CORRESPONDENCE (OUTPATIENT)
Dept: NEUROLOGY | Facility: CLINIC | Age: 67
End: 2022-01-01

## 2022-01-01 DIAGNOSIS — G20.C PARKINSONISM, UNSPECIFIED PARKINSONISM TYPE (H): Primary | ICD-10-CM

## 2022-01-01 DIAGNOSIS — F22 DELUSIONAL DISORDER (H): ICD-10-CM

## 2022-01-01 DIAGNOSIS — G20.A1 PARALYSIS AGITANS (H): ICD-10-CM

## 2022-01-01 DIAGNOSIS — F02.80 LEWY BODY DEMENTIA (H): Primary | ICD-10-CM

## 2022-01-01 DIAGNOSIS — G20.A1 PARKINSON DISEASE (H): Primary | ICD-10-CM

## 2022-01-01 DIAGNOSIS — I95.1 ORTHOSTATIC HYPOTENSION: ICD-10-CM

## 2022-01-01 DIAGNOSIS — G31.83 LEWY BODY DEMENTIA (H): Primary | ICD-10-CM

## 2022-01-01 PROCEDURE — 99214 OFFICE O/P EST MOD 30 MIN: CPT | Mod: 95 | Performed by: PSYCHIATRY & NEUROLOGY

## 2022-01-01 RX ORDER — QUETIAPINE FUMARATE 100 MG/1
100 TABLET, FILM COATED ORAL AT BEDTIME
Qty: 30 TABLET | Refills: 5 | Status: SHIPPED | OUTPATIENT
Start: 2022-01-01 | End: 2023-01-01

## 2022-01-01 RX ORDER — FLUOCINONIDE TOPICAL SOLUTION USP, 0.05% 0.5 MG/ML
SOLUTION TOPICAL
COMMUNITY
Start: 2022-01-01

## 2022-01-01 RX ORDER — POLYVINYL ALCOHOL 14 MG/ML
SOLUTION/ DROPS OPHTHALMIC
COMMUNITY
Start: 2022-01-01

## 2022-01-01 RX ORDER — QUETIAPINE FUMARATE 25 MG/1
25 TABLET, FILM COATED ORAL EVERY MORNING
Qty: 30 TABLET | Refills: 5 | Status: SHIPPED | OUTPATIENT
Start: 2022-01-01 | End: 2022-01-01

## 2022-01-01 RX ORDER — CETIRIZINE HYDROCHLORIDE 5 MG/1
TABLET ORAL
COMMUNITY
Start: 2022-01-01

## 2022-01-01 RX ORDER — OLANZAPINE 2.5 MG/1
TABLET, FILM COATED ORAL
Qty: 30 TABLET | Refills: 11 | Status: SHIPPED | OUTPATIENT
Start: 2022-01-01 | End: 2023-01-01

## 2022-01-01 RX ORDER — CLOZAPINE 25 MG/1
TABLET ORAL
COMMUNITY
End: 2022-01-01

## 2022-01-01 RX ORDER — QUETIAPINE FUMARATE 25 MG/1
1 TABLET, FILM COATED ORAL EVERY MORNING
COMMUNITY
Start: 2022-01-01 | End: 2022-01-01

## 2022-01-01 RX ORDER — OLANZAPINE 5 MG/1
TABLET ORAL
Qty: 30 TABLET | Refills: 5 | Status: SHIPPED | OUTPATIENT
Start: 2022-01-01 | End: 2022-01-01

## 2022-01-01 RX ORDER — OLANZAPINE 5 MG/1
TABLET ORAL
Qty: 30 TABLET | Refills: 5 | Status: SHIPPED | OUTPATIENT
Start: 2022-01-01 | End: 2023-01-01

## 2022-01-01 RX ORDER — CLOZAPINE 12.5 MG/1
TABLET, ORALLY DISINTEGRATING ORAL
COMMUNITY
Start: 2022-01-01 | End: 2022-01-01

## 2022-01-01 RX ORDER — QUETIAPINE FUMARATE 100 MG/1
100 TABLET, FILM COATED ORAL AT BEDTIME
Qty: 30 TABLET | Refills: 5 | Status: SHIPPED | OUTPATIENT
Start: 2022-01-01 | End: 2022-01-01

## 2022-01-01 RX ORDER — QUETIAPINE FUMARATE 25 MG/1
25 TABLET, FILM COATED ORAL DAILY PRN
COMMUNITY
End: 2023-01-01

## 2022-01-01 RX ORDER — CITALOPRAM HYDROBROMIDE 20 MG/1
TABLET ORAL
COMMUNITY
Start: 2022-01-01

## 2022-01-09 ENCOUNTER — HEALTH MAINTENANCE LETTER (OUTPATIENT)
Age: 67
End: 2022-01-09

## 2022-04-06 NOTE — PROGRESS NOTES
VIDEO VISIT    Date of Visit: April 19, 2022  Name: Eamon Whalen  Date of Birth 1955    [address]   [place] [id number] [phone] (M)  [phone] (H)  [email or web address]  Has mychart  No clear daughter has set up mychart  Marlin Treviño daughter - alejandro username 1982  [phone] mobile     Christopher Mckeon sister     younger sister in [place]  [phone]     Zully - visiting once per week to fill medications  [phone]     People involved in care.   Christopher Mckeon His sister - phone and Joe her  - [phone]  Lives a few miles away. Someone is checking on him every day.   Pee Brigidode - brother     [email or web address]  [phone]     [email or web address]   Trista phone number is [phone]     Verify [phone],      [email or web address]     [address]  [address]     [facility] physicians  KAYY Teran, VERONICA  [email or web address]  [facility]  [facility]     Nurse Thi Marceloarcadio     Sister is granted medical decision making ability today as Eamon is not able to make decisions all the time and as discussed with Eamon, his sister and nurse that she should be making decisions by default taking in account his opinion as best can be done. For now he will remain in his home with services but this may change in the future     Verify [phone] ext [phone], email link to [email or web address]    Jose Alfredo Knutson and Christopher (sister)   Marlin and Christopher are POA - medical  Brother driss is financial POA   from [place]      Weekly nursing visit  Home health aide 2/week  PCA - not sure how frequently; not M-F    Assessment:  (G20) Parkinson disease (H)  (primary encounter diagnosis)  Carbidopa/levodopa rytary 245mg 2 tabs 3/day at 8am, 2pm and 8pm  Ropinirole requip ER 2mg 24  hr daily - stopped        Review of diagnosis    Parkinson with dementia  Duration 14  Years or so    Avoidance of dopamine blockers   seroquel    Motor complication review   More freezing  Problems feeding himself - adaptive equipment to allow himself to feed  He is bent over.     Review of Impulse control disorders   Scissoring of gait, running down hallways, walking without his walker    Review of surgical or medication options       Gait/Balance/Falls   11 falls since 2022     Exercise/Therapy performed/offered   Does group activity - music therapy 2/week    Cognitive/Driving   Has confusion and dementia  Donepezil aricept 10mg daily  Rivastigmine/exelon - not using  Not always using a walker.   Delusions  Hallucinations - visual and tactile - petting the bunnies that are visiting nightly   Can be aggressive  Sexual - someone is trying to harm his daughter and ruin his Gnosticist family  Thinks he needs to help his family    Mood   Citalopram celexa 10mg   [facility] care providers   Lesa Randle ([facility])   He has not had a dose increase      Hallucinations/delusions   Quetiapine seroquel 100mg at 9pm  Quetiapine seroquel 25mg in am  Quetiapine seroquel 25mg prn     Thinking there are students there who are smoking marijuana cigarettes and sneaking in windows  Has some (possible delusions) about lingering problems from mold.      seroquel working and they are managing the delusions with distraction and calming strategies, etc.   Interventions are helpful     See above    Sleep   Sleeping okay without melatonin  Melatonin 5mg at bedtime - been off for the past 2 weeks     Bladder/Renal/Prostate/Gyn/Other  Nocturia better - only 2/noc  Finasteride proscar 5mg daily   mirabegron myrbetriq 25mg daily - not taking  tamsulosin flomax 0.4mg - daily     No recent bladder infection    GI/Constipation/GERD   Not choking  Constipation is managed with senna    ENDO/Lipid/DM/Bone density/Thyroid  Cholecalciferol vitamin  d3 50 mcg (2000 units) daily    Cardio/heart/Hyper or Hypotensive   Blood pressure has been higher when falling  No recent low blood presures  Has had low blood pressure issues in th e past      Vision/Dry Eyes/Cataracts/Glaucoma/Macular   Squinting - presumed due to dry eyes; 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 and could benefit from botox  Latanoprost xalatan 0.005% opthalmic solution both eyes at night  Timolol timoptic 0.5% ophthalmic solution 1 drop right eye twice daily and 1 drop left eye in morning    Heme/Anticoagulation/Antiplatelet/Anemia/Other  Aspirin 81mg daily    ENT/Resp  Fluticasone flonase 50 mcg/act nasal spray - not using     Skin/Cancer/Seborrhea/other  Ketoconazole cream nizoral 2% cream -using   Ketoconazole shampoo 2%  - using  Mometasone elocon 0.1% cream    Musculoskeletal/Pain/Headache      ACETAMINOPHEN TYLENOL 325MG as needed    Other:      Medications     9am 3pm 9pm      Acetaminophen tylenol 325mg  2 prn         Carbidopa-levodopa ER rytary 245mg 2 2 2      Cetirizine zyrtec 5mg 1      Citalopram celexa 10mg 1         Donepezil aricept 10mg     1     Finasteride proscar 5mg 1         Ketoconazole nizoral 2% cream daily          Ketoconazole nizoral 2% shampoo 2-3/week          Latanoprost xalatan 0.005% opthalmic solution       Both       Melatonin 5mg     1     Polyvinyl alcohol liquifilm tears 1.4% solution       Quetiapine seroquel 100mg     1     Quetiapine seroquel 25mg  1 prn        Senna-docusate Senokot-S 8.6-50 1   1     SM ASpirin 81mg low dose 1         Tamsulosin flomax 0.4mg        1      Timolol timoptic 0.5% ophthalmic solution Both     right       Vitamin D3 10mcg 400 units 5                               Plan:  Seems like it is time to put Eamon on clozapine (clozaril)  Which is more effective than seroquel in the management of his delusions/hallucinations/psychosis. Will need [facility] to manage t his on site  Typical starting dose of clozapine is 1/2 of 25mg at night with  "dose increase. Would have him remain on seroquel while clozapine is being titrated upwards. Clozapine needs weekly blood monitoring for neutropenia for 1st 6 months then biweekly for second 6 months then monthly thereafter I think.     He had a urinalysis that ruled out a urinary tract infection.     Would need daily blood pressure measurements to make sure his bp is not too llow if starting on clozapine which can drop his blood pressure    Pharmacy consultation with rubia landonrich    Return in 3 months to see Roshni mckeon on delusions, etc.     Medical Decision Making:  #  Chronic progressive medical conditions addressed  Confusion/delusions/parkinson  Review and/or interpretation of unique test or documentation from a provider outside of neurology no   Independent historian provided additional details  yes   Prescription drug management and review of potential side effects and/or monitoring for side effects  yes   Health impacted by social determinants of health  yes    I have reviewed the note as documented above.  This accurately captures the substance of my conversation with the patient and total time spent preparing for visit, executing visit and completing visit on the day of the visit:  30 minutes.  Face to face [phone]    Edison Villasenor MD      ------------------------------------------------------------------------------------------------------------------------------------------------------------------------    Video-Visit Details    The patient has been notified of following:     \"After a review of the patient s situation, this visit was changed from an in-person visit to a video visit to reduce the risk of COVID-19 exposure.   The patient is being evaluated via a billable video visit.\"    \"This video visit will be conducted via a call between you and your physician/provider. We have found that certain health care needs can be provided without the need for an in-person physical exam.  This " "service lets us provide the care you need with a video conversation.  If a prescription is necessary we can send it directly to your pharmacy.  If lab work is needed we can place an order for that and you can then stop by our lab to have the test done at a later time.    If during the course of the call the physician/provider feels a video visit is not appropriate, you will not be charged for this service.\"     Patient has given verbal consent for Video visit? Yes    Patient would like the video invitation sent by:     Type of service:  Video Visit    Video Start Time:     Video End Time (time video stopped):     Duration:  minutes - see above    Originating Location (pt. Location):     Distant Location (provider location):  [facility] NEUROLOGY [facility]     Mode of Communication:  Video Conference via [employer] (and if not possible then doximity)      Edison Villasenor MD      --------------------------------------------------------------------------------------------------------------    Eamon Whalen is a 66 year old male who is being evaluated via a billable video visit.      Charts reviewed  Consult from  Images reviewed        I have reviewed and updated the patient's Past Medical History, Social History, Family History and Medication List.    ALLERGIES  Cats and Dogs    Lasts visit details if there was a last visit:       14 Review of systems  are negative except for   Patient Active Problem List   Diagnosis     Paralysis agitans (H)     Wears glasses     Balance problems     Constipation     GERD (gastroesophageal reflux disease)     Urinary urgency     Insomnia     Seborrhea     Lumbago     Hearing loss     Family history of Parkinson's disease     Central retinal vein occlusion     Choroidal nevus     Vision problem -- Right Eye     ED (erectile dysfunction)     Other specified glaucoma     Abnormal electrocardiogram     Bifascicular block     Prolonged Q-T interval on ECG     History of EMG "     Encounter for examination for driving license     Parkinson disease (H)     Elevated prostate specific antigen (PSA)     Family history of epilepsy and other diseases of the nervous system     Gastroesophageal reflux disease     Impotence of organic origin     Other abnormalities of gait and mobility     Personal history of other medical treatment (CODE)     Benign neoplasm of choroid     Weakness     Altered mental status     Visual hallucinations     Personal history of other medical treatment     Benign prostatic hyperplasia without urinary obstruction     Lewy body dementia (H)     Repeated falls        Allergies   Allergen Reactions     Cats      Dogs      Past Surgical History:   Procedure Laterality Date     COLONOSCOPY      3/31/2021     NO HISTORY OF SURGERY       Past Medical History:   Diagnosis Date     Abnormal electrocardiogram 3/23/2017     Balance problems      Bifascicular block 3/23/2017     Central retinal vein occlusion 4/11/2014     Choroidal nevus 4/11/2014     Constipation      Elevated prostate specific antigen (PSA) 2/20/2018     Family history of Parkinson's disease      GERD (gastroesophageal reflux disease)      Glaucoma 6/4/2015     Hearing loss      History of EMG 2/20/2018    [facility]  Nerve Conduction & EMG Report  Patient:       Eamon Whalen Patient ID:    [id number] Gender:        Male YOB: 1955 Age:           62 Years 2 Months    History & Examination:  62 year old man with history of Parkinson's disease, restless leg syndrome, and paresthesias in feet and legs. Query polyneuropathy. His clinical examination      Lumbago      Parkinson's disease (H)      Prolonged Q-T interval on ECG 3/23/2017     Urinary urgency      Wears glasses      Social History     Socioeconomic History     Marital status: Single     Spouse name: Not on file     Number of children: Not on file     Years of education: Not on file     Highest  "education level: Not on file   Occupational History     Occupation: farm     Employer: SELF EMPLOYED.   Tobacco Use     Smoking status: Never Smoker     Smokeless tobacco: Never Used     Tobacco comment: cigar sometimes   Substance and Sexual Activity     Alcohol use: Yes     Comment: occ     Drug use: No     Sexual activity: Yes     Partners: Female   Other Topics Concern     Parent/sibling w/ CABG, MI or angioplasty before 65F 55M? Not Asked   Social History Narrative    1 daughter who is  and lives in [place]    She was pregnant with the 1st child and had some problems in the 6-7 month of pregnancy with     Baby that may have hydrocephalus as there was IUGR. The baby  after 3 months in 2011. She was encouraged her to undergo genetic testing which was negative and they are considering having more children.     He met a woman and may  a woman from [place] - who has [place] roots. 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 He met her 3 times. Her name is Tavia and lives in [facility]. They will be visiting within a day or so with her mother. She is 36 yrs old. She is not going back there.     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 Depending on the status of the main house may build new house.         [place] in his home town had parkinson    [place] lived [place] [facility] [place].         This was way before pesticides        Maternal grandfather     Dax Sorenson - [place] name     He would have to be brought out to a rocking chair    \"hardening of there arteries.         mother     Social Determinants of Health     Financial Resource Strain: Not on file   Food Insecurity: Not on file   Transportation Needs: Not on file   Physical Activity: Not on file   Stress: Not on file   Social Connections: Not on file   Intimate Partner " Violence: Not on file   Housing Stability: Not on file     Family History   Problem Relation Age of Onset     Heart Disease Father      Dementia Mother      Other - See Comments Daughter         living in [facility]     Other - See Comments Brother      Other - See Comments Brother      Other - See Comments Sister      Other - See Comments Sister      Other - See Comments Sister      Parkinsonism Paternal Uncle      Parkinsonism Other         paternal great grand mother     Other - See Comments Grandchild         mora's son     Other - See Comments Grandchild         mora's son     Current Outpatient Medications   Medication Sig Dispense Refill     acetaminophen (TYLENOL) 325 MG tablet Take 650 mg by mouth every 4 hours as needed for mild pain       carbidopa-levodopa ER (RYTARY) 61. MG CPCR per CR capsule 2 capsules by mouth 3/day at 8am, 2pm and 8pm = 6/day 540 capsule 3     citalopram (CELEXA) 10 MG tablet Take 10 mg by mouth daily       donepezil (ARICEPT) 10 MG tablet Take 1 tablet (10 mg) by mouth At Bedtime 90 tablet 3     finasteride (PROSCAR) 5 MG tablet 5mg tab by mouth daily @8/9am 30 tablet 3     ketoconazole (NIZORAL) 2 % external cream Apply topically as needed for itching       ketoconazole (NIZORAL) 2 % external shampoo Apply topically daily as needed for itching or irritation       latanoprost (XALATAN) 0.005 % ophthalmic solution One drop in both eyes @ 9pm 2.5 mL 1     melatonin 5 MG tablet TAKE 1 TAB BY MOUTH AT BEDTIME FOR INSOMNIA       QUEtiapine (SEROQUEL) 100 MG tablet Take 1 tablet (100 mg) by mouth At Bedtime       QUEtiapine (SEROQUEL) 25 MG tablet Take 25 mg by mouth 2 times daily as needed        senna-docusate (SENOKOT-S/PERICOLACE) 8.6-50 MG tablet Take 1 tablet by mouth 2 times daily       SM ASPIRIN ADULT LOW STRENGTH 81 MG EC tablet TAKE 1 TAB BY MOUTH ONCE DAILY       tamsulosin (FLOMAX) 0.4 MG capsule Take 1 capsule (0.4 mg) by mouth daily       timolol maleate  (TIMOPTIC) 0.5 % ophthalmic solution 1 drop right eye twice daily @ 9am and 9pm and 1 drop left eye only in morning at 9am       Vitamin D3 (VITAMIN D) 10 MCG (400 UNIT) tablet TAKE 5 TABS (2000IU) BY MOUTH DAILY

## 2022-04-19 NOTE — LETTER
4/19/2022      RE: Eamon Whalen  [address]  [place] [address]         VIDEO VISIT    Date of Visit: April 19, 2022  Name: Eamon Whalen  Date of Birth 1955    [address]   [place] [id number] [phone] (M)  [phone] (H)  [email or web address]  Has mychart  No clear daughter has set up mychart  Marlin Treviño daughter - alejandro username 1982  [phone] mobile     Christopher Mckeon sister     younger sister in [place]  [phone]     Zully - visiting once per week to fill medications  [phone]     People involved in care.   Christopher Mckeon His sister - phone and Joe her  - [phone]  Lives a few miles away. Someone is checking on him every day.   Pee Whalen - brothyudi     [email or web address]  [phone]     [email or web address]   Trista phone number is [phone]     Verify [phone],      [email or web address]     [address]  [address]     [facility] physicians  KAYY Teran, VERONICA  [email or web address]  [facility]  [facility]     Nurse Thi Graves     Sister is granted medical decision making ability today as Eamon is not able to make decisions all the time and as discussed with Eamon, his sister and nurse that she should be making decisions by default taking in account his opinion as best can be done. For now he will remain in his home with services but this may change in the future     Verify [phone] ext [phone], email link to [email or web address]    Jose Alfredo Knutson and Christopher (sister)   Marlin and Christopher are POA - medical  Brother driss is financial POA   from [place]      Weekly nursing visit  Home health aide 2/week  PCA - not sure how frequently; not M-F    Assessment:  (G20) Parkinson disease (H)  (primary encounter  diagnosis)  Carbidopa/levodopa rytary 245mg 2 tabs 3/day at 8am, 2pm and 8pm  Ropinirole requip ER 2mg 24 hr daily - stopped        Review of diagnosis    Parkinson with dementia  Duration 14  Years or so    Avoidance of dopamine blockers   seroquel    Motor complication review   More freezing  Problems feeding himself - adaptive equipment to allow himself to feed  He is bent over.     Review of Impulse control disorders   Scissoring of gait, running down hallways, walking without his walker    Review of surgical or medication options       Gait/Balance/Falls   11 falls since 2022     Exercise/Therapy performed/offered   Does group activity - music therapy 2/week    Cognitive/Driving   Has confusion and dementia  Donepezil aricept 10mg daily  Rivastigmine/exelon - not using  Not always using a walker.   Delusions  Hallucinations - visual and tactile - petting the bunnies that are visiting nightly   Can be aggressive  Sexual - someone is trying to harm his daughter and ruin his Hindu family  Thinks he needs to help his family    Mood   Citalopram celexa 10mg   [facility] care providers   Lesa Randle ([facility])   He has not had a dose increase      Hallucinations/delusions   Quetiapine seroquel 100mg at 9pm  Quetiapine seroquel 25mg in am  Quetiapine seroquel 25mg prn     Thinking there are students there who are smoking marijuana cigarettes and sneaking in windows  Has some (possible delusions) about lingering problems from mold.      seroquel working and they are managing the delusions with distraction and calming strategies, etc.   Interventions are helpful     See above    Sleep   Sleeping okay without melatonin  Melatonin 5mg at bedtime - been off for the past 2 weeks     Bladder/Renal/Prostate/Gyn/Other  Nocturia better - only 2/noc  Finasteride proscar 5mg daily   mirabegron myrbetriq 25mg daily - not taking  tamsulosin flomax 0.4mg - daily     No recent bladder infection    GI/Constipation/GERD   Not  choking  Constipation is managed with senna    ENDO/Lipid/DM/Bone density/Thyroid  Cholecalciferol vitamin d3 50 mcg (2000 units) daily    Cardio/heart/Hyper or Hypotensive   Blood pressure has been higher when falling  No recent low blood presures  Has had low blood pressure issues in th e past      Vision/Dry Eyes/Cataracts/Glaucoma/Macular   Squinting - presumed due to dry eyes; 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 and could benefit from botox  Latanoprost xalatan 0.005% opthalmic solution both eyes at night  Timolol timoptic 0.5% ophthalmic solution 1 drop right eye twice daily and 1 drop left eye in morning    Heme/Anticoagulation/Antiplatelet/Anemia/Other  Aspirin 81mg daily    ENT/Resp  Fluticasone flonase 50 mcg/act nasal spray - not using     Skin/Cancer/Seborrhea/other  Ketoconazole cream nizoral 2% cream -using   Ketoconazole shampoo 2%  - using  Mometasone elocon 0.1% cream    Musculoskeletal/Pain/Headache      ACETAMINOPHEN TYLENOL 325MG as needed    Other:      Medications     9am 3pm 9pm      Acetaminophen tylenol 325mg  2 prn         Carbidopa-levodopa ER rytary 245mg 2 2 2      Cetirizine zyrtec 5mg 1      Citalopram celexa 10mg 1         Donepezil aricept 10mg     1     Finasteride proscar 5mg 1         Ketoconazole nizoral 2% cream daily          Ketoconazole nizoral 2% shampoo 2-3/week          Latanoprost xalatan 0.005% opthalmic solution       Both       Melatonin 5mg     1     Polyvinyl alcohol liquifilm tears 1.4% solution       Quetiapine seroquel 100mg     1     Quetiapine seroquel 25mg  1 prn        Senna-docusate Senokot-S 8.6-50 1   1     SM ASpirin 81mg low dose 1         Tamsulosin flomax 0.4mg        1      Timolol timoptic 0.5% ophthalmic solution Both     right       Vitamin D3 10mcg 400 units 5                               Plan:  Seems like it is time to put Eamon on clozapine (clozaril)  Which is more effective than seroquel in the management of his delusions/hallucinations/psychosis.  "Will need blue stone to manage t his on site  Typical starting dose of clozapine is 1/2 of 25mg at night with dose increase. Would have him remain on seroquel while clozapine is being titrated upwards. Clozapine needs weekly blood monitoring for neutropenia for 1st 6 months then biweekly for second 6 months then monthly thereafter I think.     He had a urinalysis that ruled out a urinary tract infection.     Would need daily blood pressure measurements to make sure his bp is not too llow if starting on clozapine which can drop his blood pressure    Pharmacy consultation with rubia reinoso    Return in 3 months to see Roshni mckeon on delusions, etc.     Medical Decision Making:  #  Chronic progressive medical conditions addressed  Confusion/delusions/parkinson  Review and/or interpretation of unique test or documentation from a provider outside of neurology no   Independent historian provided additional details  yes   Prescription drug management and review of potential side effects and/or monitoring for side effects  yes   Health impacted by social determinants of health  yes    I have reviewed the note as documented above.  This accurately captures the substance of my conversation with the patient and total time spent preparing for visit, executing visit and completing visit on the day of the visit:  30 minutes.  Face to face [phone]    Edison Villasenor MD      ------------------------------------------------------------------------------------------------------------------------------------------------------------------------    Video-Visit Details    The patient has been notified of following:     \"After a review of the patient s situation, this visit was changed from an in-person visit to a video visit to reduce the risk of COVID-19 exposure.   The patient is being evaluated via a billable video visit.\"    \"This video visit will be conducted via a call between you and your physician/provider. We have " "found that certain health care needs can be provided without the need for an in-person physical exam.  This service lets us provide the care you need with a video conversation.  If a prescription is necessary we can send it directly to your pharmacy.  If lab work is needed we can place an order for that and you can then stop by our lab to have the test done at a later time.    If during the course of the call the physician/provider feels a video visit is not appropriate, you will not be charged for this service.\"     Patient has given verbal consent for Video visit? Yes    Patient would like the video invitation sent by:     Type of service:  Video Visit    Video Start Time:     Video End Time (time video stopped):     Duration:  minutes - see above    Originating Location (pt. Location):     Distant Location (provider location):  [facility] NEUROLOGY [facility]     Mode of Communication:  Video Conference via [employer] (and if not possible then doximity)      Edison Villasenor MD      --------------------------------------------------------------------------------------------------------------    Eamon Whalen is a 66 year old male who is being evaluated via a billable video visit.      Charts reviewed  Consult from  Images reviewed        I have reviewed and updated the patient's Past Medical History, Social History, Family History and Medication List.    ALLERGIES  Cats and Dogs    Lasts visit details if there was a last visit:       14 Review of systems  are negative except for   Patient Active Problem List   Diagnosis     Paralysis agitans (H)     Wears glasses     Balance problems     Constipation     GERD (gastroesophageal reflux disease)     Urinary urgency     Insomnia     Seborrhea     Lumbago     Hearing loss     Family history of Parkinson's disease     Central retinal vein occlusion     Choroidal nevus     Vision problem -- Right Eye     ED (erectile dysfunction)     Other specified glaucoma "     Abnormal electrocardiogram     Bifascicular block     Prolonged Q-T interval on ECG     History of EMG     Encounter for examination for driving license     Parkinson disease (H)     Elevated prostate specific antigen (PSA)     Family history of epilepsy and other diseases of the nervous system     Gastroesophageal reflux disease     Impotence of organic origin     Other abnormalities of gait and mobility     Personal history of other medical treatment (CODE)     Benign neoplasm of choroid     Weakness     Altered mental status     Visual hallucinations     Personal history of other medical treatment     Benign prostatic hyperplasia without urinary obstruction     Lewy body dementia (H)     Repeated falls        Allergies   Allergen Reactions     Cats      Dogs      Past Surgical History:   Procedure Laterality Date     COLONOSCOPY      3/31/2021     NO HISTORY OF SURGERY       Past Medical History:   Diagnosis Date     Abnormal electrocardiogram 3/23/2017     Balance problems      Bifascicular block 3/23/2017     Central retinal vein occlusion 4/11/2014     Choroidal nevus 4/11/2014     Constipation      Elevated prostate specific antigen (PSA) 2/20/2018     Family history of Parkinson's disease      GERD (gastroesophageal reflux disease)      Glaucoma 6/4/2015     Hearing loss      History of EMG 2/20/2018    [facility]  Nerve Conduction & EMG Report  Patient:       Eamon Whalen Patient ID:    [id number] Gender:        Male YOB: 1955 Age:           62 Years 2 Months    History & Examination:  62 year old man with history of Parkinson's disease, restless leg syndrome, and paresthesias in feet and legs. Query polyneuropathy. His clinical examination      Lumbago      Parkinson's disease (H)      Prolonged Q-T interval on ECG 3/23/2017     Urinary urgency      Wears glasses      Social History     Socioeconomic History     Marital status: Single     Spouse  "name: Not on file     Number of children: Not on file     Years of education: Not on file     Highest education level: Not on file   Occupational History     Occupation: farm     Employer: SELF EMPLOYED.   Tobacco Use     Smoking status: Never Smoker     Smokeless tobacco: Never Used     Tobacco comment: cigar sometimes   Substance and Sexual Activity     Alcohol use: Yes     Comment: occ     Drug use: No     Sexual activity: Yes     Partners: Female   Other Topics Concern     Parent/sibling w/ CABG, MI or angioplasty before 65F 55M? Not Asked   Social History Narrative    1 daughter who is  and lives in [place]    She was pregnant with the 1st child and had some problems in the 6-7 month of pregnancy with     Baby that may have hydrocephalus as there was IUGR. The baby  after 3 months in 2011. She was encouraged her to undergo genetic testing which was negative and they are considering having more children.     He met a woman and may  a woman from [place] - who has [place] roots. 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 He met her 3 times. Her name is Tavia and lives in [facility]. They will be visiting within a day or so with her mother. She is 36 yrs old. She is not going back there.     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 Depending on the status of the main house may build new house.         [place]att in his home town had parkinson    [place] lived [place] [facility] [place].         This was way before pesticides        Maternal grandfather     Dax Sorenson - [place] Island[place] name     He would have to be brought out to a rocking chair    \"hardening of there arteries.         mother     Social Determinants of Health     Financial Resource Strain: Not on file   Food Insecurity: Not on file   Transportation Needs: Not on file "   Physical Activity: Not on file   Stress: Not on file   Social Connections: Not on file   Intimate Partner Violence: Not on file   Housing Stability: Not on file     Family History   Problem Relation Age of Onset     Heart Disease Father      Dementia Mother      Other - See Comments Daughter         living in [facility]     Other - See Comments Brother      Other - See Comments Brother      Other - See Comments Sister      Other - See Comments Sister      Other - See Comments Sister      Parkinsonism Paternal Uncle      Parkinsonism Other         paternal great grand mother     Other - See Comments Grandchild         mora's son     Other - See Comments Grandchild         mora's son     Current Outpatient Medications   Medication Sig Dispense Refill     acetaminophen (TYLENOL) 325 MG tablet Take 650 mg by mouth every 4 hours as needed for mild pain       carbidopa-levodopa ER (RYTARY) 61. MG CPCR per CR capsule 2 capsules by mouth 3/day at 8am, 2pm and 8pm = 6/day 540 capsule 3     citalopram (CELEXA) 10 MG tablet Take 10 mg by mouth daily       donepezil (ARICEPT) 10 MG tablet Take 1 tablet (10 mg) by mouth At Bedtime 90 tablet 3     finasteride (PROSCAR) 5 MG tablet 5mg tab by mouth daily @8/9am 30 tablet 3     ketoconazole (NIZORAL) 2 % external cream Apply topically as needed for itching       ketoconazole (NIZORAL) 2 % external shampoo Apply topically daily as needed for itching or irritation       latanoprost (XALATAN) 0.005 % ophthalmic solution One drop in both eyes @ 9pm 2.5 mL 1     melatonin 5 MG tablet TAKE 1 TAB BY MOUTH AT BEDTIME FOR INSOMNIA       QUEtiapine (SEROQUEL) 100 MG tablet Take 1 tablet (100 mg) by mouth At Bedtime       QUEtiapine (SEROQUEL) 25 MG tablet Take 25 mg by mouth 2 times daily as needed        senna-docusate (SENOKOT-S/PERICOLACE) 8.6-50 MG tablet Take 1 tablet by mouth 2 times daily       SM ASPIRIN ADULT LOW STRENGTH 81 MG EC tablet TAKE 1 TAB BY MOUTH ONCE DAILY        tamsulosin (FLOMAX) 0.4 MG capsule Take 1 capsule (0.4 mg) by mouth daily       timolol maleate (TIMOPTIC) 0.5 % ophthalmic solution 1 drop right eye twice daily @ 9am and 9pm and 1 drop left eye only in morning at 9am       Vitamin D3 (VITAMIN D) 10 MCG (400 UNIT) tablet TAKE 5 TABS (2000IU) BY MOUTH DAILY               Edison Villasenor MD

## 2022-04-19 NOTE — LETTER
4/19/2022       RE: Eamon Whalen  [address]  [place] [address]     Dear Colleague,    Thank you for referring your patient, Eamon Whalen, to the [facility] NEUROLOGY CLIN[facility] [place] at [facility]. Please see a copy of my visit note below.      VIDEO VISIT    Date of Visit: April 19, 2022  Name: Eamon Whalen  Date of Birth 1955    [address]   [place] [id number] [phone] (M)  [phone] (H)  [email or web address]  Has mychart  No clear daughter has set up mychart  Marlin Treviño daughter - alejandro username 1982  [phone] mobile     Christopher Mckeon sister     younger sister in [place]  [phone]     Zully - visiting once per week to fill medications  [phone]     People involved in care.   Christopher Mckeon His sister - phone and Joe her  - [phone]  Lives a few miles away. Someone is checking on him every day.   Pee Whalen - brother     [email or web address]  [phone]     [email or web address]   Trista phone number is [phone]     Verify [phone],      [email or web address]     [address]  [address]     [facility] physicians  KAYY Teran, VERONICA  [email or web address]  [facility]  [facility]     Nurse Thi Graves     Sister is granted medical decision making ability today as Eamon is not able to make decisions all the time and as discussed with Eamon, his sister and nurse that she should be making decisions by default taking in account his opinion as best can be done. For now he will remain in his home with services but this may change in the future     Verify [phone] ext [phone], email link to [email or web address]    Jose Alfredo Knutson and Christopher (sister)   Marlin cartwright Christopher are POA - medical  Brother driss  is financial POA   from [place]      Weekly nursing visit  Home health aide 2/week  PCA - not sure how frequently; not M-F    Assessment:  (G20) Parkinson disease (H)  (primary encounter diagnosis)  Carbidopa/levodopa rytary 245mg 2 tabs 3/day at 8am, 2pm and 8pm  Ropinirole requip ER 2mg 24 hr daily - stopped        Review of diagnosis    Parkinson with dementia  Duration 14  Years or so    Avoidance of dopamine blockers   ***    Motor complication review   ***    Review of Impulse control disorders   ***    Review of surgical or medication options   ***    Gait/Balance/Falls   ***    Exercise/Therapy performed/offered   ***    Cognitive/Driving   ***  Has confusion and dementia  Donepezil aricept 10mg daily  Rivastigmine/exelon - not using  Not always using a walker.     Mood   ***  Citalopram celexa 10mg      [facility] care providers     He is getting citalopram from Lesa Randle ([employer]) and he may want to try a bit more of this dose. He is on 10mg and could possibly go to 15mg or 20mg.     Hallucinations/delusions   ***  Quetiapine seroquel 100mg at 9pm  Quetiapine seroquel 25mg 2/day as needed     Thinking there are students there who are smoking marijuana cigarettes and sneaking in windows  Has some (possible delusions) about lingering problems from mold.      seroquel working and they are managing the delusions with distraction and calming strategies, etc.   Interventions are helpful     Sleep   ***  Melatonin 5mg at bedtime    Bladder/Renal/Prostate/Gyn/Other   ***  Nocturia 2-6/noc  Finasteride proscar 5mg daily   mirabegron myrbetriq 25mg daily - not taking  tamsulosin flomax 0.4mg - daily     Had a recent bladder infection    GI/Constipation/GERD   ***    ENDO/Lipid/DM/Bone density/Thyroid  ***  Cholecalciferol vitamin d3 50 mcg (2000 units) daily    Cardio/heart/Hyper or Hypotensive   ***  Has had low blood pressure issues in th e past      Vision/Dry Eyes/Cataracts/Glaucoma/Macular    ***  Latanoprost xalatan 0.005% opthalmic solution both eyes at night  Timolol timoptic 0.5% ophthalmic solution 1 drop right eye twice daily and 1 drop left eye in morning    Heme/Anticoagulation/Antiplatelet/Anemia/Other  ***  Aspirin 81mg daily    ENT/Resp  ***  Fluticasone flonase 50 mcg/act nasal spray - not using     Skin/Cancer/Seborrhea/other  ***  Ketoconazole cream nizoral 2% cream -using   Ketoconazole shampoo 2%  - using  Mometasone elocon 0.1% cream    Musculoskeletal/Pain/Headache  ***    ACETAMINOPHEN TYLENOL 325MG as needed    Other:  ***  Medications     9am 3pm 9pm      Acetaminophen tylenol 325mg  2 prn         Carbidopa-levodopa ER rytary 245mg 2 2 2      Citalopram celexa 10mg 1         Donepezil aricept 10mg     1     Finasteride proscar 5mg 1         Ketoconazole nizoral 2% cream daily          Ketoconazole nizoral 2% shampoo 2-3/week          Latanoprost xalatan 0.005% opthalmic solution       Both       Melatonin 5mg     1     Quetiapine seroquel 100mg     1     Quetiapine seroquel 25mg  2/day prn         Senna-docusate Senokot-S 8.6-50 1   1     SM ASpirin 81mg low dose 1         Tamsulosin flomax 0.4mg        1      Timolol timoptic 0.5% ophthalmic solution Both eyes    right eye      Vitamin D3 10mcg 400 units 5                                 Plan:      Medical Decision Making:  #  Chronic progressive medical conditions addressed  ***  Review and/or interpretation of unique test or documentation from a provider outside of neurology ***   Independent historian provided additional details  ***   Prescription drug management and review of potential side effects and/or monitoring for side effects  ***   Health impacted by social determinants of health  ***    I have reviewed the note as documented above.  This accurately captures the substance of my conversation with the patient and total time spent preparing for visit, executing visit and completing visit on the day of the visit:  ***  "minutes.     Edison Villasenor MD      ------------------------------------------------------------------------------------------------------------------------------------------------------------------------    Video-Visit Details    The patient has been notified of following:     \"After a review of the patient s situation, this visit was changed from an in-person visit to a video visit to reduce the risk of COVID-19 exposure.   The patient is being evaluated via a billable video visit.\"    \"This video visit will be conducted via a call between you and your physician/provider. We have found that certain health care needs can be provided without the need for an in-person physical exam.  This service lets us provide the care you need with a video conversation.  If a prescription is necessary we can send it directly to your pharmacy.  If lab work is needed we can place an order for that and you can then stop by our lab to have the test done at a later time.    If during the course of the call the physician/provider feels a video visit is not appropriate, you will not be charged for this service.\"     Patient has given verbal consent for Video visit? Yes    Patient would like the video invitation sent by:     Type of service:  Video Visit    Video Start Time:     Video End Time (time video stopped):     Duration:  minutes - see above    Originating Location (pt. Location):     Distant Location (provider location):  [facility] NEUROLOGY [facility]     Mode of Communication:  Video Conference via [employer] (and if not possible then doximity)      Edison Villasenor MD      --------------------------------------------------------------------------------------------------------------    Eamon Whalen is a 66 year old male who is being evaluated via a billable video visit.      Charts reviewed  Consult from  Images reviewed        I have reviewed and updated the patient's Past Medical History, Social History, Family " History and Medication List.    ALLERGIES  Cats and Dogs    Lasts visit details if there was a last visit:       14 Review of systems  are negative except for   Patient Active Problem List   Diagnosis     Paralysis agitans (H)     Wears glasses     Balance problems     Constipation     GERD (gastroesophageal reflux disease)     Urinary urgency     Insomnia     Seborrhea     Lumbago     Hearing loss     Family history of Parkinson's disease     Central retinal vein occlusion     Choroidal nevus     Vision problem -- Right Eye     ED (erectile dysfunction)     Other specified glaucoma     Abnormal electrocardiogram     Bifascicular block     Prolonged Q-T interval on ECG     History of EMG     Encounter for examination for driving license     Parkinson disease (H)     Elevated prostate specific antigen (PSA)     Family history of epilepsy and other diseases of the nervous system     Gastroesophageal reflux disease     Impotence of organic origin     Other abnormalities of gait and mobility     Personal history of other medical treatment (CODE)     Benign neoplasm of choroid     Weakness     Altered mental status     Visual hallucinations     Personal history of other medical treatment     Benign prostatic hyperplasia without urinary obstruction     Lewy body dementia (H)     Repeated falls        Allergies   Allergen Reactions     Cats      Dogs      Past Surgical History:   Procedure Laterality Date     COLONOSCOPY      3/31/2021     NO HISTORY OF SURGERY       Past Medical History:   Diagnosis Date     Abnormal electrocardiogram 3/23/2017     Balance problems      Bifascicular block 3/23/2017     Central retinal vein occlusion 4/11/2014     Choroidal nevus 4/11/2014     Constipation      Elevated prostate specific antigen (PSA) 2/20/2018     Family history of Parkinson's disease      GERD (gastroesophageal reflux disease)      Glaucoma 6/4/2015     Hearing loss      History of EMG 2/20/2018    [facility]  [place] [facility]  Nerve Conduction & EMG Report  Patient:       Eamon Whalen Patient ID:    [id number] Gender:        Male YOB: 1955 Age:           62 Years 2 Months    History & Examination:  62 year old man with history of Parkinson's disease, restless leg syndrome, and paresthesias in feet and legs. Query polyneuropathy. His clinical examination      Lumbago      Parkinson's disease (H)      Prolonged Q-T interval on ECG 3/23/2017     Urinary urgency      Wears glasses      Social History     Socioeconomic History     Marital status: Single     Spouse name: Not on file     Number of children: Not on file     Years of education: Not on file     Highest education level: Not on file   Occupational History     Occupation: farm     Employer: SELF EMPLOYED.   Tobacco Use     Smoking status: Never Smoker     Smokeless tobacco: Never Used     Tobacco comment: cigar sometimes   Substance and Sexual Activity     Alcohol use: Yes     Comment: occ     Drug use: No     Sexual activity: Yes     Partners: Female   Other Topics Concern     Parent/sibling w/ CABG, MI or angioplasty before 65F 55M? Not Asked   Social History Narrative    1 daughter who is  and lives in [place]    She was pregnant with the 1st child and had some problems in the 6-7 month of pregnancy with     Baby that may have hydrocephalus as there was IUGR. The baby  after 3 months in 2011. She was encouraged her to undergo genetic testing which was negative and they are considering having more children.     He met a woman and may  a woman from [place] - who has Indian roots. 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 He met her 3 times. Her name is Tavia and lives in [facility]. They will be visiting within a day or so with her mother. She is 36 yrs old. She is not going back there.     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 Depending on the status of the main house may build new house.         [place] in his home town had parkinson    Germans lived on the other side of town        [facility] [place] 12 miles.         This was way before pesticides        Maternal grandfather     Dax Sorenson - [place] name     He would have to be brought out to a rocking chair    \"hardening of there arteries.         mother     Social Determinants of Health     Financial Resource Strain: Not on file   Food Insecurity: Not on file   Transportation Needs: Not on file   Physical Activity: Not on file   Stress: Not on file   Social Connections: Not on file   Intimate Partner Violence: Not on file   Housing Stability: Not on file     Family History   Problem Relation Age of Onset     Heart Disease Father      Dementia Mother      Other - See Comments Daughter         living in [facility]     Other - See Comments Brother      Other - See Comments Brother      Other - See Comments Sister      Other - See Comments Sister      Other - See Comments Sister      Parkinsonism Paternal Uncle      Parkinsonism Other         paternal great grand mother     Other - See Comments Grandchild         mora's son     Other - See Comments Grandchild         mora's son     Current Outpatient Medications   Medication Sig Dispense Refill     acetaminophen (TYLENOL) 325 MG tablet Take 650 mg by mouth every 4 hours as needed for mild pain       carbidopa-levodopa ER (RYTARY) 61. MG CPCR per CR capsule 2 capsules by mouth 3/day at 8am, 2pm and 8pm = 6/day 540 capsule 3     citalopram (CELEXA) 10 MG tablet Take 10 mg by mouth daily       donepezil (ARICEPT) 10 MG tablet Take 1 tablet (10 mg) by mouth At Bedtime 90 tablet 3     finasteride (PROSCAR) 5 MG tablet 5mg tab by mouth daily @8/9am 30 tablet 3     ketoconazole (NIZORAL) 2 % external cream Apply topically as needed for itching       ketoconazole (NIZORAL) 2 " % external shampoo Apply topically daily as needed for itching or irritation       latanoprost (XALATAN) 0.005 % ophthalmic solution One drop in both eyes @ 9pm 2.5 mL 1     melatonin 5 MG tablet TAKE 1 TAB BY MOUTH AT BEDTIME FOR INSOMNIA       QUEtiapine (SEROQUEL) 100 MG tablet Take 1 tablet (100 mg) by mouth At Bedtime       QUEtiapine (SEROQUEL) 25 MG tablet Take 25 mg by mouth 2 times daily as needed        senna-docusate (SENOKOT-S/PERICOLACE) 8.6-50 MG tablet Take 1 tablet by mouth 2 times daily       SM ASPIRIN ADULT LOW STRENGTH 81 MG EC tablet TAKE 1 TAB BY MOUTH ONCE DAILY       tamsulosin (FLOMAX) 0.4 MG capsule Take 1 capsule (0.4 mg) by mouth daily       timolol maleate (TIMOPTIC) 0.5 % ophthalmic solution 1 drop right eye twice daily @ 9am and 9pm and 1 drop left eye only in morning at 9am       Vitamin D3 (VITAMIN D) 10 MCG (400 UNIT) tablet TAKE 5 TABS (2000IU) BY MOUTH DAILY               Again, thank you for allowing me to participate in the care of your patient.      Sincerely,    Edison Villasenor MD

## 2022-04-19 NOTE — PATIENT INSTRUCTIONS
Assessment:  (G20) Parkinson disease (H)  (primary encounter diagnosis)  Carbidopa/levodopa rytary 245mg 2 tabs 3/day at 8am, 2pm and 8pm  Ropinirole requip ER 2mg 24 hr daily - stopped        Review of diagnosis    Parkinson with dementia  Duration 14  Years or so    Avoidance of dopamine blockers   seroquel    Motor complication review   More freezing  Problems feeding himself - adaptive equipment to allow himself to feed  He is bent over.     Review of Impulse control disorders   Scissoring of gait, running down hallways, walking without his walker    Review of surgical or medication options       Gait/Balance/Falls   11 falls since 2022     Exercise/Therapy performed/offered   Does group activity - music therapy 2/week    Cognitive/Driving   Has confusion and dementia  Donepezil aricept 10mg daily  Rivastigmine/exelon - not using  Not always using a walker.   Delusions  Hallucinations - visual and tactile - petting the bunnies that are visiting nightly   Can be aggressive  Sexual - someone is trying to harm his daughter and ruin his [employer] family  Thinks he needs to help his family    Mood   Citalopram celexa 10mg   [facility] care providers   Lesa Randle ([facility])   He has not had a dose increase      Hallucinations/delusions   Quetiapine seroquel 100mg at 9pm  Quetiapine seroquel 25mg in am  Quetiapine seroquel 25mg prn     Thinking there are students there who are smoking marijuana cigarettes and sneaking in windows  Has some (possible delusions) about lingering problems from mold.      seroquel working and they are managing the delusions with distraction and calming strategies, etc.   Interventions are helpful     See above    Sleep   Sleeping okay without melatonin  Melatonin 5mg at bedtime - been off for the past 2 weeks     Bladder/Renal/Prostate/Gyn/Other  Nocturia better - only 2/noc  Finasteride proscar 5mg daily   mirabegron myrbetriq 25mg daily - not taking  tamsulosin flomax 0.4mg - daily      No recent bladder infection    GI/Constipation/GERD   Not choking  Constipation is managed with senna    ENDO/Lipid/DM/Bone density/Thyroid  Cholecalciferol vitamin d3 50 mcg (2000 units) daily    Cardio/heart/Hyper or Hypotensive   Blood pressure has been higher when falling  No recent low blood presures  Has had low blood pressure issues in th e past      Vision/Dry Eyes/Cataracts/Glaucoma/Macular   Squinting - presumed due to dry eyes; 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 and could benefit from botox  Latanoprost xalatan 0.005% opthalmic solution both eyes at night  Timolol timoptic 0.5% ophthalmic solution 1 drop right eye twice daily and 1 drop left eye in morning    Heme/Anticoagulation/Antiplatelet/Anemia/Other  Aspirin 81mg daily    ENT/Resp  Fluticasone flonase 50 mcg/act nasal spray - not using     Skin/Cancer/Seborrhea/other  Ketoconazole cream nizoral 2% cream -using   Ketoconazole shampoo 2%  - using  Mometasone elocon 0.1% cream    Musculoskeletal/Pain/Headache      ACETAMINOPHEN TYLENOL 325MG as needed    Other:      Medications     9am 3pm 9pm      Acetaminophen tylenol 325mg  2 prn         Carbidopa-levodopa ER rytary 245mg 2 2 2      Cetirizine zyrtec 5mg 1      Citalopram celexa 10mg 1         Donepezil aricept 10mg     1     Finasteride proscar 5mg 1         Ketoconazole nizoral 2% cream daily          Ketoconazole nizoral 2% shampoo 2-3/week          Latanoprost xalatan 0.005% opthalmic solution       Both       Melatonin 5mg     1     Polyvinyl alcohol liquifilm tears 1.4% solution       Quetiapine seroquel 100mg     1     Quetiapine seroquel 25mg  1 prn        Senna-docusate Senokot-S 8.6-50 1   1     SM ASpirin 81mg low dose 1         Tamsulosin flomax 0.4mg        1      Timolol timoptic 0.5% ophthalmic solution Both     right       Vitamin D3 10mcg 400 units 5                               Plan:  Seems like it is time to put Eamon on clozapine (clozaril)  Which is more effective than seroquel in  the management of his delusions/hallucinations/psychosis. Will need blue stone to manage t his on site  Typical starting dose of clozapine is 1/2 of 25mg at night with dose increase. Would have him remain on seroquel while clozapine is being titrated upwards. Clozapine needs weekly blood monitoring for neutropenia for 1st 6 months then biweekly for second 6 months then monthly thereafter I think.     He had a urinalysis that ruled out a urinary tract infection.     Would need daily blood pressure measurements to make sure his bp is not too llow if starting on clozapine which can drop his blood pressure    Pharmacy consultation with rubia reinoso    Return in 3 months to see Roshni pleitez followup on delusions, etc.

## 2022-04-19 NOTE — TELEPHONE ENCOUNTER
"Initiating Clozapine:    We will need to call Physician group and inquire about how they do blood monitoring and where the pharmacy orders need to go. Also, if that provider would order the meds/blood work or if they will be a middle man- this gets complicated if they prefer the latter.     How does [facility] do blood monitoring? They send lab orders to [facility], then [facility] sends orders to lab ([facility]). Lab comes to facility to draw blood. ([facility])    Where do the pharmacy orders need to go? [facility] in [place] and they are certified to dispense Clozapine.    Will you order the Clozapine and blood work? Season to ask colleagues and manager. Advised her to look at the [facility] program on -line. Writer doubts they will take this over.    --------------------------------------  Spoke to Lesa Randle this morning. Patient lives in a \"Locked memory care unit\", [place]. She will contact me early next week to let me know if she, or her group, would prescribe the Clozapine.            "

## 2022-04-20 NOTE — TELEPHONE ENCOUNTER
Lesa Randle NP calling back to speak to FAYE Eric please call back at P [phone] NP will be avail to be reached at anytime.

## 2022-04-20 NOTE — TELEPHONE ENCOUNTER
Lesa Randle NP calling back to speak to FAYE Eric.    Please call back at P [phone] NP will be avail to be reached at anytime.

## 2022-04-26 NOTE — TELEPHONE ENCOUNTER
She is ok prescribing clozapine. She is now a registered prescriber. She will begin the dosing immediately and follow the recommendations on the [facility] website for that.    Her question is, how and when does Dr. Villasenor want her to taper him off of the Seroquel? Currently he takes 100 mg at bedtime and 25 mg in the am.

## 2022-04-27 NOTE — TELEPHONE ENCOUNTER
"Received this message from Sheree Casey and Dr. Villasenor:    \"We would recommend that the provider consider adding clozapine first at the lowest dose of 6.25-12.5 mg every evening then reassess in one week how he is responding to this low dose. The Seroquel can be reduced by 25 mg at a time (typically weekly) as the clozapine is increased by 6.25-12.5 mg at a time (typically weekly). Most patients with Parkinson's disease take 25-50 mg/day of clozapine but lower or higher doses can also be used.\"    Read the above recommendations to Lesa Randle NP. She stated that she is on the same page with this and appreciated the follow up call. Patient began clozapine last night. Staff is instructed to inform Season of patient's condition.      "

## 2022-04-27 NOTE — TELEPHONE ENCOUNTER
Edison Villasenor MD  You; Jeaneth Abdullahi RN 14 hours ago (4:43 PM)     PT    Can we see how he is with a small dose of clozapine first 1/4 or 1/2 tab of 25mg of clozapine before weaning and the wean will be something that will have to see how Eamon is doing.   If he is sedated etc. They will have to adjust        We would recommend that the provider consider adding clozapine first at the lowest dose of 6.25-12.5 mg every evening then reassess in one week how he is responding to this low dose. The Seroquel can be reduced by 25 mg at a time (typically weekly) as the clozapine is increased by 6.25-12.5 mg at a time (typically weekly). Most patients with Parkinson's disease take 25-50 mg/day of clozapine but lower or higher doses can also be used.    Sheree Casey, Pharm.D.  Medication Therapy Management Pharmacist  [facility] Neurology

## 2022-05-31 NOTE — TELEPHONE ENCOUNTER
"Eamon has had more falls in the past week (6 falls in the last 5 days). This is more than his usual falling and is \"heavier\" falling, he cannot get up as easily as before. He has needed an EZ stand and wheel chair which he has not needed in the past. UA was negative. Cristina is asking for Dr. Villasenor's thoughts and if changes need to be made.    Currently on:  Clozapine 12.5 mg in the AM and 25 mg in the evening  Quetiapine 75 mg in the evening    Last Medication changes:  -5/19 Clozapine increased from 12.5 mg to 25 mg at bedtime  -5/15 Quetiapine 25 mg AM dose discontinued  "

## 2022-05-31 NOTE — TELEPHONE ENCOUNTER
[facility] Call Center    Phone Message    May a detailed message be left on voicemail: yes     Reason for Call:  Cristina at [facility] called to speak to care team concerning recent medication changes, pt having more frequent falls. Cristina. GISELN asking for a call back, she can be reached from now - 4pm - [phone] opt 2    Action Taken: Message routed to:  [facility]    Travel Screening: Not Applicable

## 2022-06-06 NOTE — TELEPHONE ENCOUNTER
Sheree,    Can you address this?  Since he hasn't been on it for a long time, can he stop at the current dose? Or does he need a titration by 12.5 mg weekly?    It's been a long time since I have dealt with Clozapine.    Jolene,     I don't know anything about his psychosis. Is he still on the Quetiapine or the dose has been reduced?

## 2022-06-06 NOTE — TELEPHONE ENCOUNTER
Yes, the clozapine can be stopped at this dosage. If the falls resolve off clozapine then we probably can't restart it. It is possible that he is experiencing orthostatic hypotension which can be a side effect of clozapine.     Sheree Casey, Pharm.D.  Medication Therapy Management Pharmacist  [facility]

## 2022-06-06 NOTE — TELEPHONE ENCOUNTER
Cristina reports he is on 12.5 mg BID but had 6 falls in 3 days (5/30-6/1). They held clozapine on 6/1 and on 6/2 he had no falls. Clozapine was restarted at bedtime on 6/2 and he continues to fall. He is negative for CVOID19 and UTI. No other illness's or flare ups of anything else. He had a couple falls that were witnessed and he appeared drunk, stepping his feet over each other and looked wobbly. They need the okay to stop clozapine if Dr. Villasenor is okay with this.    Plan/Recommendation:  1. I will talk with Dr. Villasenor and call Cristina and Lesa Randle to advise

## 2022-06-06 NOTE — TELEPHONE ENCOUNTER
Spoke to Thi at [facility] regarding stopping clozapine. They need a signed order (electronic signature okay) faxed to them.  Fax: [phone]    Left a message for Lesa Randle with this update.

## 2022-06-06 NOTE — TELEPHONE ENCOUNTER
KAROLYN Evans at [facility] called to clarify patients medication, per Cristina Clozapine is on hold as of now, she reports that pt have been falling the last few days. PMD wanting directions from  before resuming.   Please call Cristina at at [phone] opt  2     F#: [phone]

## 2022-06-07 NOTE — TELEPHONE ENCOUNTER
Clozapine discontinuation order signed by Roshni and faxed to nursing at [facility].

## 2022-07-22 NOTE — TELEPHONE ENCOUNTER
"Situation:  Eamon Whalen is a 66 year old male who receives support for Parkinson's disease. [facility] facility calls today with concerns for inappropriate sexual behaviors and aggression.    Background:  Patient is taking:  Medications 8AM 2PM 8PM HS (9PM) PRN   Rytary 245 2 2 2     Donepezil 10 mg    1    Quetiapine 100 mg    1    Quetiapine 25 mg 1    1   -Citalopram 20 mg (1 tab daily) started on 7/6 - prescribed by house doctor it for behaviours  -Clozapine discontinue 6/7 due to increased falls  -Quetiapine 25 mg PRN has had not effect and has bottomed out his blood pressure sometimes (yesterday went to 80s-90s/50s )    Assessment:  Received fax med list and chart notes from Eamon's [facility] facility. KAROLYN Evans and FAYE Ness report he is having a lot of delusions and hallucinations that mostly are sexual in nature. They have increased slowly over since March but has been more prominent in the last few weeks. Some days he has constant behaviors/desluions. A pharmacist reviewed his chart and included recommendations in the fax. Asked them to provide specific examples of behaviors.    Delusions:  -Daughter is bleeding from her eye and he cannot help her  -\"This is a convent I need to leave because it is a sex cult\"  -Has said multiple times staff are sleeping with his roommate or jacking off other men (in the facility) and not him.\"   -No one gives him attention like they do to other residents (they have increased 1:1 time and socialization with other male residents however)    Behaviors:  -Yelling, pushing tables, pushing his walker over aggressively. He has not physically attempted to harm anyone  -Has said over the last few weeks he is lonely and feels they give other male staff more attention (he has had 1:1 time with staff and increased time with other residents).    Last UA was in early July and was negative per Cristina  A consulting pharmacist Aurelio Skaggs recommended trying olanzapine since he did fail clozapine " and quetiapine is not helping. They are asking about this recommendation or something else Dr. Villasenor would recommend.    Plan/recommendation:  1. I advised I will connect with one of the covering providers for Dr. Villasenor and call them back    12 minute phone call

## 2022-07-22 NOTE — TELEPHONE ENCOUNTER
[facility] Call Center    Phone Message    May a detailed message be left on voicemail: yes     Reason for Call: Other: FAYE Ness from [facility] is requesting Dr. Villasenor to call her back in regards to this patient's behavior. Thi states the patient is making sexual innuendos to staff and now residents. Thi states the patient is also being physically and verbally aggressive. Please call Thi MCKINNEY or Cristina MCKINNEY back at  # [phone] EXT 2 or 3.     Action Taken: Message routed to:  [facility] ([facility] Neurology     Travel Screening: Not Applicable

## 2022-07-22 NOTE — TELEPHONE ENCOUNTER
Discussed with neurology team and we would prefer that the patient start olanzapine for his behaviors. We recommend starting with 2.5 mg nightly and increase to 5 mg if needed after 1 week. This can be added to quetiapine and then wean off quetiapine over the next few weeks if he responds to olanzapine.     Sheree Casey, Pharm.D.  Medication Therapy Management Pharmacist  [facility]

## 2022-07-22 NOTE — TELEPHONE ENCOUNTER
Plan for today:   1. Start Olanzapine 2.5 mg nightly x 1 week, then increase to 5 mg nightly   2. Stop PRN Quetiapine.   3. Continue Quetiapine 100 mg nightly + 25 mg in the morning.   4. If behaviors improve, we can reduce quetiapine by 25 mg weekly.     If the olanzapine causes worsening of motor symptoms, we may have to re-trial low-dose clozapine. All orders were placed per CPA with Dr. Villasenor.     Sheree Casey, Pharm.D.  Medication Therapy Management Pharmacist  [facility]

## 2022-08-08 NOTE — PROGRESS NOTES
VIDEO VISIT    Date of Visit: August 16, 2022  Name: Eamon Whalen  Date of Birth 1955    [address]   [place] [id number] [phone] (M)  [phone] (H)  [email or web address]    Marlin Treviño daughter - alejandro username 1982  [id number] mobile     Christopher Mckeon sister     younger sister in [place]  [phone]      Zully - visiting once per week to fill medications  [phone]     People involved in care.   Christopher Mckeon His sister - phone and Joe her  - [phone]  Lives a few miles away. Someone is checking on him every day.   Pee Whalen - brother     [email or web address] [email or web address]  [phone]     [email or web address]   Trista phone number is [phone]     Verify [phone],      [email or web address]     [address]  [address]     [facility] physicians  KAYY Teran, CNP  [email or web address]  [facility], Primary Care  [facility]     Nurse Thi Graves     Sister is granted medical decision making ability today as Eamon is not able to make decisions all the time and as discussed with Eamon, his sister and nurse that she should be making decisions by default taking in account his opinion as best can be done. For now he will remain in his home with services but this may change in the future     Verify [phone] ext [phone], email link to [email or web address]     Jose Alfredo Knutson and Christopher (sister)   Marlin and Christopher are POA - medical  Brother driss is financial POA   from [place]      Weekly nursing visit  Home health aide 2/week  PCA - not sure how frequently; not M-F    Seems like it is time to put Eamon on clozapine (clozaril)  Which is more effective than seroquel in the management of his delusions/hallucinations/psychosis. Will need blue stone to manage t his on site  Typical  starting dose of clozapine is 1/2 of 25mg at night with dose increase. Would have him remain on seroquel while clozapine is being titrated upwards. Clozapine needs weekly blood monitoring for neutropenia for 1st 6 months then biweekly for second 6 months then monthly thereafter I think.      He had a urinalysis that ruled out a urinary tract infection.      Would need daily blood pressure measurements to make sure his bp is not too llow if starting on clozapine which can drop his blood pressure     Pharmacy consultation with rubia reinoso     Return in 3 months to see Roshni pleitez followup on delusions, etc.     Assessment:  Parkinson  Carbidopa/levodopa rytary 245mg 2 tabs 3/day at 8am, 2pm and 8pm  Ropinirole requip ER 2mg 24 hr daily - stopped    Review of diagnosis    Parkinson with dementia  Duration 14  Years or so     Avoidance of dopamine blockers   seroquel     Motor complication review   More freezing  Problems feeding himself - adaptive equipment to allow himself to feed  He is bent over.      Review of Impulse control disorders   Scissoring of gait, running down hallways, walking without his walker     Review of surgical or medication options         Gait/Balance/Falls   11 falls since 2022   He has an assortment of falls - found on floor  Some of them may have been related to falling or he was looking for things on the floor  Total were 6 between 10th and 14th of august     Exercise/Therapy performed/offered   Does group activity - music therapy 2/week     Cognitive/Driving   Has confusion and dementia  Donepezil aricept 10mg daily  Rivastigmine/exelon - not using  Not always using a walker.   Delusions  Hallucinations - visual and tactile - petting the bunnies that are visiting nightly   Can be aggressive  Sexual - someone is trying to harm his daughter and ruin his [employer] family  Thinks he needs to help his family     Mood   Citalopram celexa 10mg   [facility] care providers   Lesa Randle  ([place])   He has not had a dose increase      Hallucinations/delusions   Clozapine fazaclo 12.5mg - stopped  Olanzapine zyprexa 5mg  - taking 2.5 at bedtime   Quetiapine seroquel 100mg at 9pm  Quetiapine seroquel 25mg - stopped       Thinking there are students there who are smoking marijuana cigarettes and sneaking in windows  Has some (possible delusions) about lingering problems from mold.      seroquel working and they are managing the delusions with distraction and calming strategies, etc.   Interventions are helpful      See above     Sleep   Sleeping okay without melatonin  Melatonin 5mg at bedtime - been off for the past 2 weeks      Bladder/Renal/Prostate/Gyn/Other  Nocturia better - only 2/noc  Finasteride proscar 5mg daily   mirabegron myrbetriq 25mg daily - not taking  tamsulosin flomax 0.4mg - daily     No recent bladder infection    Dr goldberg   Elevated psa  psa has dropped with injections  Urgency has improved      GI/Constipation/GERD   Not choking  Constipation is managed with senna     ENDO/Lipid/DM/Bone density/Thyroid  Cholecalciferol vitamin d3 50 mcg (2000 units) daily     Cardio/heart/Hyper or Hypotensive   Blood pressure has been higher when falling  No recent low blood presures  Has had low blood pressure issues in th e past      Vision/Dry Eyes/Cataracts/Glaucoma/Macular   Squinting - presumed due to dry eyes; 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 and could benefit from botox  Latanoprost xalatan 0.005% opthalmic solution both eyes at night  Timolol timoptic 0.5% ophthalmic solution 1 drop right eye twice daily and 1 drop left eye in morning    Has cataract evaluation to see if needs surgery     Heme/Anticoagulation/Antiplatelet/Anemia/Other  Aspirin 81mg daily     ENT/Resp  Fluticasone flonase 50 mcg/act nasal spray - not using   Dental appointment   5 teeth removed in past and had been on narcotics       Skin/Cancer/Seborrhea/other  Ketoconazole cream nizoral 2% cream -using   Ketoconazole  shampoo 2%  - using  Mometasone elocon 0.1% cream     Musculoskeletal/Pain/Headache      ACETAMINOPHEN TYLENOL 325MG as needed     Other:        Medications     9am 3pm 9pm      Acetaminophen tylenol 325mg  2 prn         Acetaminophen-codeine tylenol #3 300-30 no      Aspirin 81mg below      Carbidopa-levodopa ER rytary 245mg 2 2 2      Cetirizine zyrtec 5mg 1         Citalopram celexa  20mg  1         clozapainee fazaclo 12.5mg stopped      Donepezil aricept 10mg     1     Finasteride proscar 5mg 1         Ketoconazole nizoral 2% cream daily          Ketoconazole nizoral 2% shampoo 2-3/week          Latanoprost xalatan 0.005% opthalmic solution       Both       Melatonin 5mg     1     Olanzapine zyprexa 5mg   1/2    Polyvinyl alcohol liquifilm tears 1.4% solution           Quetiapine seroquel 100mg     1     Quetiapine seroquel 25mg  prn      Senna-docusate Senokot-S 8.6-50 1   1     SM ASpirin 81mg low dose 1         Tamsulosin flomax 0.4mg        1      Timolol timoptic 0.5% ophthalmic solution Both     right       Vitamin D3 10mcg 400 units 5                                       Plan:  Discussion about his eye issues - he will get an evaluation for cataracts. He may also have blepharospasm that could benefit from botox injection.     Would reduce the olanzapine to 1/2 of 2.5mg tablets as he had a number of falls that may be related to the olanzapine and would not stop it entirely as it helped his psychosis. He also has had a decline in his mobility which we hope will improve with the olanzapine (zyprexa) dose reduction.     Prescription updated - stopped the 5mg tablet and prescribed a 2.5mg tab    No change in rytary     Will get a ustep walker with laser     Return back to see me or Roshni in 3 months.     Medical Decision Making:  #  Chronic progressive medical conditions addressed  Eye and parkinson  Review and/or interpretation of unique test or documentation from a provider outside of neurology no  "  Independent historian provided additional details  yes   Prescription drug management and review of potential side effects and/or monitoring for side effects  yes   Health impacted by social determinants of health  yes    I have reviewed the note as documented above.  This accurately captures the substance of my conversation with the patient and total time spent preparing for visit, executing visit and completing visit on the day of the visit:  30 minutes. Face to face 208-230pm    Edison Villasenor MD      ------------------------------------------------------------------------------------------------------------------------------------------------------------------------    Video-Visit Details    The patient has been notified of following:     \"After a review of the patient s situation, this visit was changed from an in-person visit to a video visit to reduce the risk of COVID-19 exposure.   The patient is being evaluated via a billable video visit.\"    \"This video visit will be conducted via a call between you and your physician/provider. We have found that certain health care needs can be provided without the need for an in-person physical exam.  This service lets us provide the care you need with a video conversation.  If a prescription is necessary we can send it directly to your pharmacy.  If lab work is needed we can place an order for that and you can then stop by our lab to have the test done at a later time.    If during the course of the call the physician/provider feels a video visit is not appropriate, you will not be charged for this service.\"     Patient has given verbal consent for Video visit? Yes    Patient would like the video invitation sent by:     Type of service:  Video Visit    Video Start Time:     Video End Time (time video stopped):     Duration:  minutes - see above    Originating Location (pt. Location):     Distant Location (provider location):  [facility] NEUROLOGY [facility] " [facility]     Mode of Communication:  Video Conference via [id number] (and if not possible then doximity)      Edison Villasenor MD      --------------------------------------------------------------------------------------------------------------    Eamon Whalen is a 66 year old male who is being evaluated via a billable video visit.      Charts reviewed  Consult from  Images reviewed        I have reviewed and updated the patient's Past Medical History, Social History, Family History and Medication List.    ALLERGIES  Cats and Dogs    Lasts visit details if there was a last visit:       14 Review of systems  are negative except for   Patient Active Problem List   Diagnosis     Paralysis agitans (H)     Wears glasses     Balance problems     Constipation     GERD (gastroesophageal reflux disease)     Urinary urgency     Insomnia     Seborrhea     Lumbago     Hearing loss     Family history of Parkinson's disease     Central retinal vein occlusion     Choroidal nevus     Vision problem -- Right Eye     ED (erectile dysfunction)     Other specified glaucoma     Abnormal electrocardiogram     Bifascicular block     Prolonged Q-T interval on ECG     History of EMG     Encounter for examination for driving license     Parkinson disease (H)     Elevated prostate specific antigen (PSA)     Family history of epilepsy and other diseases of the nervous system     Gastroesophageal reflux disease     Impotence of organic origin     Other abnormalities of gait and mobility     Personal history of other medical treatment (CODE)     Benign neoplasm of choroid     Weakness     Altered mental status     Visual hallucinations     Personal history of other medical treatment     Benign prostatic hyperplasia without urinary obstruction     Lewy body dementia (H)     Repeated falls        Allergies   Allergen Reactions     Cats      Dogs      Past Surgical History:   Procedure Laterality Date     COLONOSCOPY      3/31/2021     NO HISTORY  OF SURGERY       Past Medical History:   Diagnosis Date     Abnormal electrocardiogram 3/23/2017     Balance problems      Bifascicular block 3/23/2017     Central retinal vein occlusion 2014     Choroidal nevus 2014     Constipation      Elevated prostate specific antigen (PSA) 2018     Family history of Parkinson's disease      GERD (gastroesophageal reflux disease)      Glaucoma 2015     Hearing loss      History of EMG 2018    [facility]  Nerve Conduction & EMG Report  Patient:       Eamon Whalen Patient ID:    [id number] Gender:        Male YOB: 1955 Age:           62 Years 2 Months    History & Examination:  62 year old man with history of Parkinson's disease, restless leg syndrome, and paresthesias in feet and legs. Query polyneuropathy. His clinical examination      Lumbago      Parkinson's disease (H)      Prolonged Q-T interval on ECG 3/23/2017     Urinary urgency      Wears glasses      Social History     Socioeconomic History     Marital status: Single     Spouse name: Not on file     Number of children: Not on file     Years of education: Not on file     Highest education level: Not on file   Occupational History     Occupation: farm     Employer: SELF EMPLOYED.   Tobacco Use     Smoking status: Never Smoker     Smokeless tobacco: Never Used     Tobacco comment: cigar sometimes   Substance and Sexual Activity     Alcohol use: Yes     Comment: occ     Drug use: No     Sexual activity: Yes     Partners: Female   Other Topics Concern     Parent/sibling w/ CABG, MI or angioplasty before 65F 55M? Not Asked   Social History Narrative    1 daughter who is  and lives in [place]    She was pregnant with the 1st child and had some problems in the 6-7 month of pregnancy with     Baby that may have hydrocephalus as there was IUGR. The baby  after 3 months in 2011. She was encouraged her to undergo genetic testing which  "was negative and they are considering having more children.     He met a woman and may  a woman from [place] - who has [place] roots. 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 He met her 3 times. Her name is Tavia and lives in [facility]. They will be visiting within a day or so with her mother. She is 36 yrs old. She is not going back there.     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 Depending on the status of the main house may build new house.         [place] in his home town had parkinson    Germ[place] lived on the other side of town        [facility] [place] of [place] 12 miles.         This was way before pesticides        Maternal grandfather     Dax Sorenson - Lao name     He would have to be brought out to a rocking chair    \"hardening of there arteries.         mother     Social Determinants of Health     Financial Resource Strain: Not on file   Food Insecurity: Not on file   Transportation Needs: Not on file   Physical Activity: Not on file   Stress: Not on file   Social Connections: Not on file   Intimate Partner Violence: Not on file   Housing Stability: Not on file     Family History   Problem Relation Age of Onset     Heart Disease Father      Dementia Mother      Other - See Comments Daughter         living in [facility]     Other - See Comments Brother      Other - See Comments Brother      Other - See Comments Sister      Other - See Comments Sister      Other - See Comments Sister      Parkinsonism Paternal Uncle      Parkinsonism Other         paternal great grand mother     Other - See Comments Grandchild         mora's son     Other - See Comments Grandchild         mora's son     Current Outpatient Medications   Medication Sig Dispense Refill     acetaminophen (TYLENOL) 325 MG tablet Take 650 mg by mouth every 4 hours as needed for mild pain       " acetaminophen-codeine (TYLENOL #3) 300-30 MG tablet TAKE 1 TAB BY MOUTH EVERY 4 HOURS AS NEEDED FOR PAIN       carbidopa-levodopa ER (RYTARY) 61. MG CPCR per CR capsule 2 capsules by mouth 3/day at 8am, 2pm and 8pm = 6/day 540 capsule 3     cetirizine (ZYRTEC) 5 MG tablet TAKE 1 TAB BY MOUTH ONCE DAILY       citalopram (CELEXA) 10 MG tablet Take 10 mg by mouth daily       citalopram (CELEXA) 20 MG tablet 20mg tab daily       cloZAPine (FAZACLO) 12.5 MG disintegrating tablet PLACE 1 TAB ON THE TONGUE EVERY MORNING;TAKE 2 TABS (25MG) BY MOUTH AT BEDTIME (TDD 37.5MG)       donepezil (ARICEPT) 10 MG tablet Take 1 tablet (10 mg) by mouth At Bedtime 90 tablet 3     finasteride (PROSCAR) 5 MG tablet 5mg tab by mouth daily @8/9am 30 tablet 3     ketoconazole (NIZORAL) 2 % external cream Apply topically as needed for itching       ketoconazole (NIZORAL) 2 % external shampoo Apply topically daily as needed for itching or irritation       latanoprost (XALATAN) 0.005 % ophthalmic solution One drop in both eyes @ 9pm 2.5 mL 1     melatonin 5 MG tablet TAKE 1 TAB BY MOUTH AT BEDTIME FOR INSOMNIA       OLANZapine (ZYPREXA) 5 MG tablet TAKE BY MOUTH 1/2 TABLET (2.5 MG) AT BEDTIME. MAY INCREASE TO 1 TABLET (5 MG) AT BEDTIME AFTER ONE WEEK IF NEEDED 30 tablet 5     polyvinyl alcohol (LIQUIFILM TEARS) 1.4 % ophthalmic solution INSTILL 1 DROP IN BOTH EYES FOUR TIMES DAILY       QUEtiapine (SEROQUEL) 100 MG tablet Take 1 tablet (100 mg) by mouth At Bedtime 30 tablet 5     QUEtiapine (SEROQUEL) 25 MG tablet Take 1 tablet (25 mg) by mouth every morning (STOP PRN doses) 30 tablet 5     senna-docusate (SENOKOT-S/PERICOLACE) 8.6-50 MG tablet Take 1 tablet by mouth 2 times daily       SM ASPIRIN ADULT LOW STRENGTH 81 MG EC tablet TAKE 1 TAB BY MOUTH ONCE DAILY       tamsulosin (FLOMAX) 0.4 MG capsule Take 1 capsule (0.4 mg) by mouth daily       timolol maleate (TIMOPTIC) 0.5 % ophthalmic solution 1 drop right eye twice daily @ 9am and  9pm and 1 drop left eye only in morning at 9am       Vitamin D3 (VITAMIN D) 10 MCG (400 UNIT) tablet TAKE 5 TABS (2000IU) BY MOUTH DAILY

## 2022-08-08 NOTE — PROGRESS NOTES
Received medication list from [facility]  Records Date of service: 1955  Copy has been sent to scanning and encounter routed to specialty nurse for review.

## 2022-08-09 NOTE — PROGRESS NOTES
Received medication list, and records from [facility]  Records Date of service: 8/1/22  Copy has been sent to scanning and encounter routed to specialty nurse for review.         negative...

## 2022-08-09 NOTE — CONFIDENTIAL NOTE
Facility was called and message was left asking if patient was still taken these medications, clinic number was left for nursing staff to call clinic back     Updated medication list was fax, copy was sent to Dr Villasenor and copy was sent to be scanned into patient chart

## 2022-08-09 NOTE — TELEPHONE ENCOUNTER
----- Message from Edison Villasenor MD sent at 8/9/2022  3:28 PM CDT -----  Regarding: can you call facility and see if taking both clozapine/clozaril and zyprexa/olanzapine as both listed

## 2022-08-10 NOTE — TELEPHONE ENCOUNTER
Health Call Center    Phone Message    May a detailed message be left on voicemail: no     Reason for Call: Other: Cristina calling to return the call from Yandy regarding Eamon's medication update. She stated that she will just fax the medication list and updated notes to [phone]. Please call Cristina [phone], option 2 for any questions or concerns.     Action Taken: Message routed to:  [facility]OLOG[facility]    Travel Screening: Not Applicable

## 2022-08-12 NOTE — TELEPHONE ENCOUNTER
"Fax received from Jacqueline Robertson LPN regarding Jerzy medication update.  \"Eamon has been notably better with the addition of olanzapine at HA. We have kept it at 2.5 mg. No verbal remarks from Eamon over the weekend about things sexual in nature. With the addition of this he has had an increase in falls, along with more freezing. We would like to continue with his olanzapine 2.5 mg.  "

## 2022-08-16 NOTE — PATIENT INSTRUCTIONS
Medications     9am 3pm 9pm      Acetaminophen tylenol 325mg  2 prn         Acetaminophen-codeine tylenol #3 300-30 no      Aspirin 81mg below      Carbidopa-levodopa ER rytary 245mg 2 2 2      Cetirizine zyrtec 5mg 1         Citalopram celexa  20mg  1         clozapainee fazaclo 12.5mg stopped      Donepezil aricept 10mg     1     Finasteride proscar 5mg 1         Ketoconazole nizoral 2% cream daily          Ketoconazole nizoral 2% shampoo 2-3/week          Latanoprost xalatan 0.005% opthalmic solution       Both       Melatonin 5mg     1     Olanzapine zyprexa 5mg   1/2    Polyvinyl alcohol liquifilm tears 1.4% solution           Quetiapine seroquel 100mg     1     Quetiapine seroquel 25mg  prn      Senna-docusate Senokot-S 8.6-50 1   1     SM ASpirin 81mg low dose 1         Tamsulosin flomax 0.4mg        1      Timolol timoptic 0.5% ophthalmic solution Both     right       Vitamin D3 10mcg 400 units 5                                       Plan:  Discussion about his eye issues - he will get an evaluation for cataracts. He may also have blepharospasm that could benefit from botox injection.     Would reduce the olanzapine to 1/2 of 2.5mg tablets as he had a number of falls that may be related to the olanzapine and would not stop it entirely as it helped his psychosis. He also has had a decline in his mobility which we hope will improve with the olanzapine (zyprexa) dose reduction.     Prescription updated - stopped the 5mg tablet and prescribed a 2.5mg tab    No change in rytary     Will get a ustep walker with laser     Return back to see me or Roshni in 3 months.

## 2022-08-16 NOTE — PROGRESS NOTES
Eamon is a 66 year old who is being evaluated via a billable video visit.      How would you like to obtain your AVS? Mychart  If the video visit is dropped, the invitation should be resent by:[phone]      Video-Visit Details    Video Start Time:     Type of service:  Video Visit    Video End Time:    Originating Location (pt. Location): Home    Distant Location (provider location):  [facility] NEUROLOGY [facility]     Platform used for Video Visit: [employer] [place]

## 2022-08-16 NOTE — LETTER
8/16/2022       RE: Eamon Whalen  [address]  [place] [address]     Dear Colleague,    Thank you for referring your patient, Eamon Whalen, to the [facility] NEUROLOGY CLINIC [place] [facility]. Please see a copy of my visit note below.        VIDEO VISIT    Date of Visit: August 16, 2022  Name: Eamon Whalen  Date of Birth 1955    [address]   [place] [id number] [phone] (M)  [phone] (H)  [email or web address]    Marlin Treviño daughter - alejandro username 1982  [id number] mobile     Christopher Mckeon sister     younger sister in [place]  [phone]     Zully - visiting once per week to fill medications  [phone]     People involved in care.   Christopher Mckeon His sister - phone and Joe her  - [phone]  Lives a few miles away. Someone is checking on him every day.   Pee Whalen - brother     [email or web address]  [phone]     [email or web address]   Trista phone number is [phone]     Verify [phone],      [email or web address]     [facility]  [address]  [address]     [facility] physicians  KAYY Teran, VERONICA  [email or web address]  [facility]  [facility]     Nurse Thi Graves     Sister is granted medical decision making ability today as Eamon is not able to make decisions all the time and as discussed with Eamon, his sister and nurse that she should be making decisions by default taking in account his opinion as best can be done. For now he will remain in his home with services but this may change in the future     Verify [phone] ext [phone], email link to [email or web address]     Jose Alfredo Knutson and Christopher (sister)   Marlin and Christopher are POA - medical  Brother driss is financial POA   from [place]  [employer]      Weekly nursing visit  Home health aide 2/week  PCA - not sure how frequently; not M-F    Seems like it is time to put Eamon on clozapine (clozaril)  Which is more effective than seroquel in the management of his delusions/hallucinations/psychosis. Will need blue stone to manage t his on site  Typical starting dose of clozapine is 1/2 of 25mg at night with dose increase. Would have him remain on seroquel while clozapine is being titrated upwards. Clozapine needs weekly blood monitoring for neutropenia for 1st 6 months then biweekly for second 6 months then monthly thereafter I think.      He had a urinalysis that ruled out a urinary tract infection.      Would need daily blood pressure measurements to make sure his bp is not too llow if starting on clozapine which can drop his blood pressure     Pharmacy consultation with rubia reinoso     Return in 3 months to see Roshni pleitez followup on delusions, etc.     Assessment:  Parkinson  Carbidopa/levodopa rytary 245mg 2 tabs 3/day at 8am, 2pm and 8pm  Ropinirole requip ER 2mg 24 hr daily - stopped    Review of diagnosis    Parkinson with dementia  Duration 14  Years or so     Avoidance of dopamine blockers   seroquel     Motor complication review   More freezing  Problems feeding himself - adaptive equipment to allow himself to feed  He is bent over.      Review of Impulse control disorders   Scissoring of gait, running down hallways, walking without his walker     Review of surgical or medication options         Gait/Balance/Falls   11 falls since 2022   He has an assortment of falls - found on floor  Some of them may have been related to falling or he was looking for things on the floor  Total were 6 between 10th and 14th of august     Exercise/Therapy performed/offered   Does group activity - music therapy 2/week     Cognitive/Driving   Has confusion and dementia  Donepezil aricept 10mg daily  Rivastigmine/exelon - not using  Not always using a  walker.   Delusions  Hallucinations - visual and tactile - petting the bunnies that are visiting nightly   Can be aggressive  Sexual - someone is trying to harm his daughter and ruin his [employer] family  Thinks he needs to help his family     Mood   Citalopram celexa 10mg   [facility] care providers   Lesa Jer ([facility])   He has not had a dose increase      Hallucinations/delusions   Clozapine fazaclo 12.5mg - stopped  Olanzapine zyprexa 5mg  - taking 2.5 at bedtime   Quetiapine seroquel 100mg at 9pm  Quetiapine seroquel 25mg - stopped       Thinking there are students there who are smoking marijuana cigarettes and sneaking in windows  Has some (possible delusions) about lingering problems from mold.      seroquel working and they are managing the delusions with distraction and calming strategies, etc.   Interventions are helpful      See above     Sleep   Sleeping okay without melatonin  Melatonin 5mg at bedtime - been off for the past 2 weeks      Bladder/Renal/Prostate/Gyn/Other  Nocturia better - only 2/noc  Finasteride proscar 5mg daily   mirabegron myrbetriq 25mg daily - not taking  tamsulosin flomax 0.4mg - daily     No recent bladder infection    Dr goldberg   Elevated psa  psa has dropped with injections  Urgency has improved      GI/Constipation/GERD   Not choking  Constipation is managed with senna     ENDO/Lipid/DM/Bone density/Thyroid  Cholecalciferol vitamin d3 50 mcg (2000 units) daily     Cardio/heart/Hyper or Hypotensive   Blood pressure has been higher when falling  No recent low blood presures  Has had low blood pressure issues in th e past      Vision/Dry Eyes/Cataracts/Glaucoma/Macular   Squinting - presumed due to dry eyes; 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 and could benefit from botox  Latanoprost xalatan 0.005% opthalmic solution both eyes at night  Timolol timoptic 0.5% ophthalmic solution 1 drop right eye twice daily and 1 drop left eye in morning    Has cataract evaluation to see if  needs surgery     Heme/Anticoagulation/Antiplatelet/Anemia/Other  Aspirin 81mg daily     ENT/Resp  Fluticasone flonase 50 mcg/act nasal spray - not using   Dental appointment   5 teeth removed in past and had been on narcotics       Skin/Cancer/Seborrhea/other  Ketoconazole cream nizoral 2% cream -using   Ketoconazole shampoo 2%  - using  Mometasone elocon 0.1% cream     Musculoskeletal/Pain/Headache      ACETAMINOPHEN TYLENOL 325MG as needed     Other:        Medications     9am 3pm 9pm      Acetaminophen tylenol 325mg  2 prn         Acetaminophen-codeine tylenol #3 300-30 no      Aspirin 81mg below      Carbidopa-levodopa ER rytary 245mg 2 2 2      Cetirizine zyrtec 5mg 1         Citalopram celexa  20mg  1         clozapainee fazaclo 12.5mg stopped      Donepezil aricept 10mg     1     Finasteride proscar 5mg 1         Ketoconazole nizoral 2% cream daily          Ketoconazole nizoral 2% shampoo 2-3/week          Latanoprost xalatan 0.005% opthalmic solution       Both       Melatonin 5mg     1     Olanzapine zyprexa 5mg   1/2    Polyvinyl alcohol liquifilm tears 1.4% solution           Quetiapine seroquel 100mg     1     Quetiapine seroquel 25mg  prn      Senna-docusate Senokot-S 8.6-50 1   1     SM ASpirin 81mg low dose 1         Tamsulosin flomax 0.4mg        1      Timolol timoptic 0.5% ophthalmic solution Both     right       Vitamin D3 10mcg 400 units 5                                       Plan:  Discussion about his eye issues - he will get an evaluation for cataracts. He may also have blepharospasm that could benefit from botox injection.     Would reduce the olanzapine to 1/2 of 2.5mg tablets as he had a number of falls that may be related to the olanzapine and would not stop it entirely as it helped his psychosis. He also has had a decline in his mobility which we hope will improve with the olanzapine (zyprexa) dose reduction.     Prescription updated - stopped the 5mg tablet and prescribed a 2.5mg  "tab    No change in rytary     Will get a ustep walker with laser     Return back to see me or Roshni in 3 months.     Medical Decision Making:  #  Chronic progressive medical conditions addressed  Eye and parkinson  Review and/or interpretation of unique test or documentation from a provider outside of neurology no   Independent historian provided additional details  yes   Prescription drug management and review of potential side effects and/or monitoring for side effects  yes   Health impacted by social determinants of health  yes    I have reviewed the note as documented above.  This accurately captures the substance of my conversation with the patient and total time spent preparing for visit, executing visit and completing visit on the day of the visit:  30 minutes. Face to face 208-230pm    Edison Villasenor MD      ------------------------------------------------------------------------------------------------------------------------------------------------------------------------    Video-Visit Details    The patient has been notified of following:     \"After a review of the patient s situation, this visit was changed from an in-person visit to a video visit to reduce the risk of COVID-19 exposure.   The patient is being evaluated via a billable video visit.\"    \"This video visit will be conducted via a call between you and your physician/provider. We have found that certain health care needs can be provided without the need for an in-person physical exam.  This service lets us provide the care you need with a video conversation.  If a prescription is necessary we can send it directly to your pharmacy.  If lab work is needed we can place an order for that and you can then stop by our lab to have the test done at a later time.    If during the course of the call the physician/provider feels a video visit is not appropriate, you will not be charged for this service.\"     Patient has given verbal consent for Video " visit? Yes    Patient would like the video invitation sent by:     Type of service:  Video Visit    Video Start Time:     Video End Time (time video stopped):     Duration:  minutes - see above    Originating Location (pt. Location):     Distant Location (provider location):  [facility] NEUROLOGY CLINIC [place]     Mode of Communication:  Video Conference via [employer] (and if not possible then doximity)      Edison Villasenor MD      --------------------------------------------------------------------------------------------------------------    Eamon Whalen is a 66 year old male who is being evaluated via a billable video visit.      Charts reviewed  Consult from  Images reviewed        I have reviewed and updated the patient's Past Medical History, Social History, Family History and Medication List.    ALLERGIES  Cats and Dogs    Lasts visit details if there was a last visit:       14 Review of systems  are negative except for   Patient Active Problem List   Diagnosis     Paralysis agitans (H)     Wears glasses     Balance problems     Constipation     GERD (gastroesophageal reflux disease)     Urinary urgency     Insomnia     Seborrhea     Lumbago     Hearing loss     Family history of Parkinson's disease     Central retinal vein occlusion     Choroidal nevus     Vision problem -- Right Eye     ED (erectile dysfunction)     Other specified glaucoma     Abnormal electrocardiogram     Bifascicular block     Prolonged Q-T interval on ECG     History of EMG     Encounter for examination for driving license     Parkinson disease (H)     Elevated prostate specific antigen (PSA)     Family history of epilepsy and other diseases of the nervous system     Gastroesophageal reflux disease     Impotence of organic origin     Other abnormalities of gait and mobility     Personal history of other medical treatment (CODE)     Benign neoplasm of choroid     Weakness     Altered mental status     Visual hallucinations      Personal history of other medical treatment     Benign prostatic hyperplasia without urinary obstruction     Lewy body dementia (H)     Repeated falls        Allergies   Allergen Reactions     Cats      Dogs      Past Surgical History:   Procedure Laterality Date     COLONOSCOPY      3/31/2021     NO HISTORY OF SURGERY       Past Medical History:   Diagnosis Date     Abnormal electrocardiogram 3/23/2017     Balance problems      Bifascicular block 3/23/2017     Central retinal vein occlusion 4/11/2014     Choroidal nevus 4/11/2014     Constipation      Elevated prostate specific antigen (PSA) 2/20/2018     Family history of Parkinson's disease      GERD (gastroesophageal reflux disease)      Glaucoma 6/4/2015     Hearing loss      History of EMG 2/20/2018    [facility]  Nerve Conduction & EMG Report  Patient:       Eamon Whalen Patient ID:    [id number] Gender:        Male YOB: 1955 Age:           62 Years 2 Months    History & Examination:  62 year old man with history of Parkinson's disease, restless leg syndrome, and paresthesias in feet and legs. Query polyneuropathy. His clinical examination      Lumbago      Parkinson's disease (H)      Prolonged Q-T interval on ECG 3/23/2017     Urinary urgency      Wears glasses      Social History     Socioeconomic History     Marital status: Single     Spouse name: Not on file     Number of children: Not on file     Years of education: Not on file     Highest education level: Not on file   Occupational History     Occupation: farm     Employer: SELF EMPLOYED.   Tobacco Use     Smoking status: Never Smoker     Smokeless tobacco: Never Used     Tobacco comment: cigar sometimes   Substance and Sexual Activity     Alcohol use: Yes     Comment: occ     Drug use: No     Sexual activity: Yes     Partners: Female   Other Topics Concern     Parent/sibling w/ CABG, MI or angioplasty before 65F 55M? Not Asked   Social History  "Narrative    1 daughter who is  and lives in [place]    She was pregnant with the 1st child and had some problems in the 6-7 month of pregnancy with     Baby that may have hydrocephalus as there was IUGR. The baby  after 3 months in 2011. She was encouraged her to undergo genetic testing which was negative and they are considering having more children.     He met a woman and may  a woman from [place] - who has [place] roots. 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 He met her 3 times. Her name is Tavia and lives in [facility]. They will be visiting within a day or so with her mother. She is 36 yrs old. She is not going back there.     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 Depending on the status of the main house may build new house.         [place] in his home town had parkinson    [place] [facility] [place].         This was way before pesticides        Maternal grandfather     Dax Sorenson - Ethiopian name     He would have to be brought out to a rocking chair    \"hardening of there arteries.         mother     Social Determinants of Health     Financial Resource Strain: Not on file   Food Insecurity: Not on file   Transportation Needs: Not on file   Physical Activity: Not on file   Stress: Not on file   Social Connections: Not on file   Intimate Partner Violence: Not on file   Housing Stability: Not on file     Family History   Problem Relation Age of Onset     Heart Disease Father      Dementia Mother      Other - See Comments Daughter         living in [facility]     Other - See Comments Brother      Other - See Comments Brother      Other - See Comments Sister      Other - See Comments Sister      Other - See Comments Sister      Parkinsonism Paternal Uncle      Parkinsonism Other         paternal great " grand mother     Other - See Comments Grandchild         mora's son     Other - See Comments Grandchild         mora's son     Current Outpatient Medications   Medication Sig Dispense Refill     acetaminophen (TYLENOL) 325 MG tablet Take 650 mg by mouth every 4 hours as needed for mild pain       acetaminophen-codeine (TYLENOL #3) 300-30 MG tablet TAKE 1 TAB BY MOUTH EVERY 4 HOURS AS NEEDED FOR PAIN       carbidopa-levodopa ER (RYTARY) 61. MG CPCR per CR capsule 2 capsules by mouth 3/day at 8am, 2pm and 8pm = 6/day 540 capsule 3     cetirizine (ZYRTEC) 5 MG tablet TAKE 1 TAB BY MOUTH ONCE DAILY       citalopram (CELEXA) 10 MG tablet Take 10 mg by mouth daily       citalopram (CELEXA) 20 MG tablet 20mg tab daily       cloZAPine (FAZACLO) 12.5 MG disintegrating tablet PLACE 1 TAB ON THE TONGUE EVERY MORNING;TAKE 2 TABS (25MG) BY MOUTH AT BEDTIME (TDD 37.5MG)       donepezil (ARICEPT) 10 MG tablet Take 1 tablet (10 mg) by mouth At Bedtime 90 tablet 3     finasteride (PROSCAR) 5 MG tablet 5mg tab by mouth daily @8/9am 30 tablet 3     ketoconazole (NIZORAL) 2 % external cream Apply topically as needed for itching       ketoconazole (NIZORAL) 2 % external shampoo Apply topically daily as needed for itching or irritation       latanoprost (XALATAN) 0.005 % ophthalmic solution One drop in both eyes @ 9pm 2.5 mL 1     melatonin 5 MG tablet TAKE 1 TAB BY MOUTH AT BEDTIME FOR INSOMNIA       OLANZapine (ZYPREXA) 5 MG tablet TAKE BY MOUTH 1/2 TABLET (2.5 MG) AT BEDTIME. MAY INCREASE TO 1 TABLET (5 MG) AT BEDTIME AFTER ONE WEEK IF NEEDED 30 tablet 5     polyvinyl alcohol (LIQUIFILM TEARS) 1.4 % ophthalmic solution INSTILL 1 DROP IN BOTH EYES FOUR TIMES DAILY       QUEtiapine (SEROQUEL) 100 MG tablet Take 1 tablet (100 mg) by mouth At Bedtime 30 tablet 5     QUEtiapine (SEROQUEL) 25 MG tablet Take 1 tablet (25 mg) by mouth every morning (STOP PRN doses) 30 tablet 5     senna-docusate (SENOKOT-S/PERICOLACE) 8.6-50 MG tablet  Take 1 tablet by mouth 2 times daily       SM ASPIRIN ADULT LOW STRENGTH 81 MG EC tablet TAKE 1 TAB BY MOUTH ONCE DAILY       tamsulosin (FLOMAX) 0.4 MG capsule Take 1 capsule (0.4 mg) by mouth daily       timolol maleate (TIMOPTIC) 0.5 % ophthalmic solution 1 drop right eye twice daily @ 9am and 9pm and 1 drop left eye only in morning at 9am       Vitamin D3 (VITAMIN D) 10 MCG (400 UNIT) tablet TAKE 5 TABS (2000IU) BY MOUTH DAILY           Again, thank you for allowing me to participate in the care of your patient.      Sincerely,    Edison Villasenor MD

## 2022-08-17 NOTE — PROGRESS NOTES
Received current medication list   Records Date of service: 8/16/22  Copy has been sent to scanning and encounter routed to specialty nurse for review.

## 2022-09-07 NOTE — PROGRESS NOTES
Received medication list from [facility]   Records Date of service: 9/6/2022  Copy has been sent to scanning and encounter routed to specialty nurse for review.

## 2022-10-19 NOTE — TELEPHONE ENCOUNTER
SECOND LEVEL APPEAL APPROVED    Medication: carbidopa-levodopa ER (RYTARY) 61. MG CPCR   Authorization Effective Date:  09/14/2020  Authorization Expiration Date:  03/12/2021  Approved Dose/Quantity: 270  Reference #: PA# [id number] for NDC# [id number]  Insurance Company: [place] Medicaid ([facility]) - Phone [phone] Fax [phone]  Which Pharmacy is filling the prescription (Not needed for infusion/clinic administered): [id number] ([address]       19-Oct-2022

## 2022-12-14 NOTE — TELEPHONE ENCOUNTER
Rx Authorization:  Requested Medication/ Dose QUEtiapine (SEROQUEL) 100 MG tablet  Date last refill ordered: 7/22/22  Quantity ordered: 30 tablets  # refills: 5  Date of last clinic visit with ordering provider: 8/16/22  Date of next clinic visit with ordering provider: 1/24/22  All pertinent protocol data (lab date/result):   Include pertinent information from patients message:

## 2023-01-01 ENCOUNTER — TELEPHONE (OUTPATIENT)
Dept: NEUROLOGY | Facility: CLINIC | Age: 68
End: 2023-01-01

## 2023-01-01 ENCOUNTER — VIRTUAL VISIT (OUTPATIENT)
Dept: NEUROLOGY | Facility: CLINIC | Age: 68
End: 2023-01-01
Payer: COMMERCIAL

## 2023-01-01 DIAGNOSIS — G20.C PARKINSONISM, UNSPECIFIED PARKINSONISM TYPE (H): ICD-10-CM

## 2023-01-01 DIAGNOSIS — G20.A1 PARALYSIS AGITANS (H): ICD-10-CM

## 2023-01-01 DIAGNOSIS — F22 DELUSIONAL DISORDER (H): ICD-10-CM

## 2023-01-01 DIAGNOSIS — R41.89 COGNITIVE CHANGE: ICD-10-CM

## 2023-01-01 DIAGNOSIS — G20.A1 PARKINSON DISEASE (H): Primary | ICD-10-CM

## 2023-01-01 PROCEDURE — 99214 OFFICE O/P EST MOD 30 MIN: CPT | Mod: 95 | Performed by: PSYCHIATRY & NEUROLOGY

## 2023-01-01 RX ORDER — OLANZAPINE 2.5 MG/1
TABLET, FILM COATED ORAL
Qty: 90 TABLET | Refills: 11 | Status: SHIPPED | OUTPATIENT
Start: 2023-01-01 | End: 2023-01-01

## 2023-01-01 RX ORDER — CHOLECALCIFEROL (VITAMIN D3) 50 MCG
1 TABLET ORAL
COMMUNITY
Start: 2022-01-01

## 2023-01-01 RX ORDER — QUETIAPINE FUMARATE 25 MG/1
25 TABLET, FILM COATED ORAL DAILY PRN
Qty: 90 TABLET | Refills: 3 | Status: SHIPPED | OUTPATIENT
Start: 2023-01-01

## 2023-01-01 RX ORDER — OLANZAPINE 2.5 MG/1
TABLET, FILM COATED ORAL
Qty: 90 TABLET | Refills: 11 | COMMUNITY
Start: 2023-01-01 | End: 2023-01-01

## 2023-01-01 RX ORDER — QUETIAPINE FUMARATE 100 MG/1
100 TABLET, FILM COATED ORAL AT BEDTIME
Qty: 30 TABLET | Refills: 5 | Status: SHIPPED | OUTPATIENT
Start: 2023-01-01

## 2023-01-01 RX ORDER — LEVODOPA AND CARBIDOPA 245; 61.25 MG/1; MG/1
CAPSULE, EXTENDED RELEASE ORAL
Qty: 540 CAPSULE | Refills: 3 | Status: SHIPPED | OUTPATIENT
Start: 2023-01-01

## 2023-01-01 RX ORDER — DONEPEZIL HYDROCHLORIDE 10 MG/1
10 TABLET, FILM COATED ORAL AT BEDTIME
Qty: 90 TABLET | Refills: 3 | Status: SHIPPED | OUTPATIENT
Start: 2023-01-01

## 2023-01-01 RX ORDER — FLUTICASONE PROPIONATE 50 MCG
2 SPRAY, SUSPENSION (ML) NASAL DAILY
COMMUNITY
Start: 2022-01-01

## 2023-01-01 RX ORDER — OLANZAPINE 2.5 MG/1
TABLET, FILM COATED ORAL
Qty: 90 TABLET | Refills: 11 | Status: SHIPPED | OUTPATIENT
Start: 2023-01-01

## 2023-01-09 NOTE — TELEPHONE ENCOUNTER
[facility] Call Center    Phone Message    May a detailed message be left on voicemail: yes     Reason for Call: Symptoms or Concerns     If patient has red-flag symptoms, warm transfer to triage line    Current symptom or concern: Pt is having a lot of fails, mobility    Symptoms have been present for:  2 week(s)    Has patient previously been seen for this? Yes    By : Dr. Villasenor    Date: 01-09-23    Are there any new or worsening symptoms? Yes: Christopher called to inform Dr. Villasenor's team that Pt is having a hard time with mobility and has been increasing the number of fails.  Christopher states the assisted living would like her to reach out to neurologist and would like Dr. Villasenor to touch base with the care team either Monday or Thursday.    Please call Christopher back at [phone] to discuss and advise.      Action Taken: Message routed to:  [facility] Neurology    Travel Screening: Not Applicable

## 2023-01-09 NOTE — TELEPHONE ENCOUNTER
ROZ with his Sister Christopher thomas ...schedule him for a 70 minute New Movement disorder appointment with Roshni Gonzales NP. It is okay to use 2 return slots for this appointment to get him seen ASAP.         1/9/23 BD

## 2023-01-09 NOTE — TELEPHONE ENCOUNTER
Dr. Villasenor wanted Eamon to be seen back in November by Roshni. He is scheduled out into April.

## 2023-01-09 NOTE — TELEPHONE ENCOUNTER
Left a message for Christopher asking her to have the facility rule out an infections such as a UTI. I will have scheduling call to schedule Eamon for an appointment with Roshni Gonzales NP.

## 2023-01-10 NOTE — PROGRESS NOTES
VIDEO VISIT    Date of Visit: January 13, 2023  Name: Eamon Whalen  Date of Birth 1955    [id number]    [id number]   [place] [address]  [phone] (M)  [phone] (H)  [email or web address]     Marlin Treviño daughter - alejandro username 1982  [phone] mobile     Christopher Mckeon sister     younger sister in [place]  [phone]     Zully - visiting once per week to fill medications  [phone]     People involved in care.   Christopher Mckeon His sister - phone and Joe her  - [phone]  Lives a few miles away. Someone is checking on him every day.   Pee Whalen - [email or web address]  [phone]     [email or web address]   Trista phone number is [phone]     Verify [phone],      [email or web address]     [address]  [address]     [facility] physicians  KAYY Teran, CNP  [email or web address]  [facility]  [facility] [place]     Nurse Thi Graves     Sister is granted medical decision making ability today as Eamon is not able to make decisions all the time and as discussed with Eamon, his sister and nurse that she should be making decisions by default taking in account his opinion as best can be done. For now he will remain in his home with services but this may change in the future     Verify [phone] ext [phone], email link to [email or web address]     Jose Alfredo Knutson and Christopher (sister)   Marlin and Christopher are POA - medical  Brother driss is financial POA   from [place]      Weekly nursing visit  Home health aide 2/week  PCA - not sure how frequently; not M-F    [email or web address]  Alejandra    Sister Christopher mckeon visits when she can - her spouse is joe and they are nearby  Daughter Marlin Treviño and son in law visit - [place]    Sister  [place]  Sister Tammie  Brother Sergio  Brother Pee     Assessment:  (G20) Parkinson disease (H)  (primary encounter diagnosis) - seen by me before 2008  Carbidopa/levodopa rytary 245mg 2 tabs 3/day at 8am, 2pm and 8pm  Ropinirole requip ER 2mg 24 hr daily - stopped     Review of diagnosis    Parkinson with dementia  Duration 14  Years or so -  Now about 15 years      Avoidance of dopamine blockers   seroquel     Motor complication review   More freezing  Problems feeding himself - adaptive equipment to allow himself to feed  He is bent over.      Review of Impulse control disorders   Scissoring of gait, running down hallways, walking without his walker     Review of surgical or medication options    No change in rytary      Will get a ustep walker with laser      Gait/Balance/Falls   11 falls since 2022   He has an assortment of falls - found on floor  Some of them may have been related to falling or he was looking for things on the floor  Total were 6 between 10th and 14th of august     Exercise/Therapy performed/offered   Does group activity - music therapy 2/week     Cognitive/Driving   Has confusion and dementia  Donepezil aricept 10mg daily  Rivastigmine/exelon - not using  Not always using a walker.   Delusions  Hallucinations - visual and tactile - petting the bunnies that are visiting nightly   Can be aggressive  Sexual - someone is trying to harm his daughter and ruin his [employer] family  Thinks he needs to help his family     Mood   Citalopram celexa 10mg   [facility] care providers   Lesa Randle (Nadeen)   He has not had a dose increase      Hallucinations/delusions   Clozapine fazaclo 12.5mg - stopped  Olanzapine zyprexa 5mg  - taking 2.5 at bedtime   Quetiapine seroquel 100mg at 9pm  Quetiapine seroquel 25mg - stopped    Seems like it is time to put Eamon on clozapine (clozaril)  Which is more effective than seroquel in the management of his delusions/hallucinations/psychosis. Will need [place] to  manage t his on site  Typical starting dose of clozapine is 1/2 of 25mg at night with dose increase. Would have him remain on seroquel while clozapine is being titrated upwards. Clozapine needs weekly blood monitoring for neutropenia for 1st 6 months then biweekly for second 6 months then monthly thereafter I think.      He had a urinalysis that ruled out a urinary tract infection.       Thinking there are students there who are smoking marijuana cigarettes and sneaking in windows  Has some (possible delusions) about lingering problems from mold.      seroquel working and they are managing the delusions with distraction and calming strategies, etc.   Interventions are helpful      See above    Would reduce the olanzapine to 1/2 of 2.5mg tablets as he had a number of falls that may be related to the olanzapine and would not stop it entirely as it helped his psychosis. He also has had a decline in his mobility which we hope will improve with the olanzapine (zyprexa) dose reduction.      Prescription updated - stopped the 5mg tablet and prescribed a 2.5mg tab     Sleep   Sleeping okay without melatonin  Melatonin 5mg at bedtime - been off for the past 2 weeks      Bladder/Renal/Prostate/Gyn/Other  Nocturia better - only 2/noc  Finasteride proscar 5mg daily   mirabegron myrbetriq 25mg daily - not taking  tamsulosin flomax 0.4mg - daily     No recent bladder infection     Dr goldberg   Elevated psa  psa has dropped with injections  Urgency has improved      GI/Constipation/GERD   Not choking  Constipation is managed with senna     ENDO/Lipid/DM/Bone density/Thyroid  Cholecalciferol vitamin d3 50 mcg (2000 units) daily     Cardio/heart/Hyper or Hypotensive   Blood pressure has been higher when falling  No recent low blood presures  Has had low blood pressure issues in th e past    Would need daily blood pressure measurements to make sure his bp is not too llow if starting on clozapine which can drop his blood  pressure      Vision/Dry Eyes/Cataracts/Glaucoma/Macular   Squinting - presumed due to dry eyes; 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 He may also have blepharospasm that could benefit from botox injection.       Heme/Anticoagulation/Antiplatelet/Anemia/Other  Aspirin 81mg daily     ENT/Resp  Fluticasone flonase 50 mcg/act nasal spray - not using   Dental appointment   5 teeth removed in past and had been on narcotics      Skin/Cancer/Seborrhea/other  Ketoconazole cream nizoral 2% cream -using   Ketoconazole shampoo 2%  - using  Mometasone elocon 0.1% cream     Musculoskeletal/Pain/Headache      ACETAMINOPHEN TYLENOL 325MG as needed     Other:      Medications     8/9am 3pm 9pm      Acetaminophen tylenol 325mg  2 prn         Aspirin 81mg below         Carbidopa-levodopa ER rytary 245mg 2 2 2      Cetirizine zyrtec 5mg 1         Citalopram celexa  20mg  1         clozapine fazaclo 12.5mg stopped         Donepezil aricept 10mg     1     Finasteride proscar 5mg 1         Fluocinonide lidex 0.05% solution       Fluticasone flonase 50mg/act spray        Ketoconazole nizoral 2% cream daily          Ketoconazole nizoral 2% shampoo 2-3/week          Latanoprost xalatan 0.005% opthalmic solution       Both       Melatonin 5mg     1     Olanzazpine zyprexa 2.5mg   1/2->1    Olanzapine zyprexa 5mg Off       Polyvinyl alcohol liquifilm tears 1.4% solution           Quetiapine seroquel 100mg   1     Quetiapine seroquel 25mg  prn         Senna-docusate Senokot-S 8.6-50 1   1     SM ASpirin 81mg low dose 1         Tamsulosin flomax 0.4mg        1      Timolol timoptic 0.5% ophthalmic solution Both     right      "  Vitamin D3 10mcg 400 units 5            Vitamin D3 50mcg 2000 units                          Plan:    Ongoing fall issues - in living rooms; may be related to fixation on something  He may fall without an assistive device   Finished PT a few weeks  Has a new walker and walks better  Spending more time in a wheelchair    Ongoing fixation on various things that may precipitate a fall  He has more problems in the mornings over the past week  Falls are in the afternoon in living area  Will increase his zyprexa from 1/2 of 2.5mg to 2.5mg at night  He will remain on seroquel/quetiapine 100mg at night scheduled with 25mg dose as needed    Alejandra - nurse manager  [email or web address]  [phone] for check in    Encouraged him to get help walking    Stated that he cannot resume driving    Return in 6 months    Medical Decision Making:  #  Chronic progressive medical conditions addressed  Delusion, parkinson  Review and/or interpretation of unique test or documentation from a provider outside of neurology no   Independent historian provided additional details  yes   Prescription drug management and review of potential side effects and/or monitoring for side effects  yes   Health impacted by social determinants of health  Yes - facility.     I have reviewed the note as documented above.  This accurately captures the substance of my conversation with the patient and total time spent preparing for visit, executing visit and completing visit on the day of the visit:  30 minutes.     Edison Villasenor MD      ------------------------------------------------------------------------------------------------------------------------------------------------------------------------    Video-Visit Details    The patient has been notified of following:     \"After a review of the patient s situation, this visit was changed from an in-person visit to a video visit to reduce the risk of COVID-19 exposure.   The patient is being " "evaluated via a billable video visit.\"    \"This video visit will be conducted via a call between you and your physician/provider. We have found that certain health care needs can be provided without the need for an in-person physical exam.  This service lets us provide the care you need with a video conversation.  If a prescription is necessary we can send it directly to your pharmacy.  If lab work is needed we can place an order for that and you can then stop by our lab to have the test done at a later time.    If during the course of the call the physician/provider feels a video visit is not appropriate, you will not be charged for this service.\"     Patient has given verbal consent for Video visit? Yes    Patient would like the video invitation sent by:     Type of service:  Video Visit    Video Start Time:     Video End Time (time video stopped):     Duration:  minutes - see above    Originating Location (pt. Location):     Distant Location (provider location):  [facility] [place]     Mode of Communication:  Video Conference via Bolt (and if not possible then doximity)      Edison Villasenor MD      --------------------------------------------------------------------------------------------------------------    Eamon Whalen is a 67 year old male who is being evaluated via a billable video visit.      Charts reviewed  Consult from  Images reviewed        I have reviewed and updated the patient's Past Medical History, Social History, Family History and Medication List.    ALLERGIES  Cats and Dogs    Lasts visit details if there was a last visit:       14 Review of systems  are negative except for   Patient Active Problem List   Diagnosis     Paralysis agitans (H)     Wears glasses     Balance problems     Constipation     GERD (gastroesophageal reflux disease)     Urinary urgency     Insomnia     Seborrhea     Lumbago     Hearing loss     Family history of Parkinson's disease     " Central retinal vein occlusion     Choroidal nevus     Vision problem -- Right Eye     ED (erectile dysfunction)     Other specified glaucoma     Abnormal electrocardiogram     Bifascicular block     Prolonged Q-T interval on ECG     History of EMG     Encounter for examination for driving license     Parkinson disease (H)     Elevated prostate specific antigen (PSA)     Family history of epilepsy and other diseases of the nervous system     Gastroesophageal reflux disease     Impotence of organic origin     Other abnormalities of gait and mobility     Personal history of other medical treatment (CODE)     Benign neoplasm of choroid     Weakness     Altered mental status     Visual hallucinations     Personal history of other medical treatment     Benign prostatic hyperplasia without urinary obstruction     Lewy body dementia (H)     Repeated falls        Allergies   Allergen Reactions     Cats      Dogs      Past Surgical History:   Procedure Laterality Date     COLONOSCOPY      3/31/2021     other surgical history      prostate procedure     Past Medical History:   Diagnosis Date     Abnormal electrocardiogram 3/23/2017     Balance problems      Bifascicular block 3/23/2017     Central retinal vein occlusion 4/11/2014     Choroidal nevus 4/11/2014     Constipation      Elevated prostate specific antigen (PSA) 2/20/2018     Family history of Parkinson's disease      GERD (gastroesophageal reflux disease)      Glaucoma 6/4/2015     Hearing loss      History of EMG 2/20/2018    [facility]  Nerve Conduction & EMG Report  Patient:       Eamon Whalen Patient ID:    [id number] Gender:        Male YOB: 1955 Age:           62 Years 2 Months    History & Examination:  62 year old man with history of Parkinson's disease, restless leg syndrome, and paresthesias in feet and legs. Query polyneuropathy. His clinical examination      Lumbago      Parkinson's disease (H)       "Prolonged Q-T interval on ECG 3/23/2017     Urinary urgency      Wears glasses      Social History     Socioeconomic History     Marital status: Single     Spouse name: Not on file     Number of children: Not on file     Years of education: Not on file     Highest education level: Not on file   Occupational History     Occupation: farm     Employer: SELF EMPLOYED.   Tobacco Use     Smoking status: Never     Smokeless tobacco: Never     Tobacco comments:     cigar sometimes   Substance and Sexual Activity     Alcohol use: Yes     Comment: occ     Drug use: No     Sexual activity: Yes     Partners: Female   Other Topics Concern     Parent/sibling w/ CABG, MI or angioplasty before 65F 55M? Not Asked   Social History Narrative    1 daughter who is  and lives in [place]    She was pregnant with the 1st child and had some problems in the 6-7 month of pregnancy with     Baby that may have hydrocephalus as there was IUGR. The baby  after 3 months in 2011. She was encouraged her to undergo genetic testing which was negative and they are considering having more children.     He met a woman and may  a woman from [place] - who has [place] roots. 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 He met her 3 times. Her name is Tavia and lives in [facility]. They will be visiting within a day or so with her mother. She is 36 yrs old. She is not going back there.     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 Depending on the status of the main house may build new house.         [place] in his home town had parkinson    [place] lived [place] the [place] side of [facility] -- [place] of [place] 12 miles.         This was way before pesticides        Maternal grandfather     Dax Sorenson - Citizen of [place] name     He would have to be brought out to a rocking chair    \"hardening of there arteries.         " mother     Social Determinants of Health     Financial Resource Strain: Not on file   Food Insecurity: Not on file   Transportation Needs: Not on file   Physical Activity: Not on file   Stress: Not on file   Social Connections: Not on file   Intimate Partner Violence: Not on file   Housing Stability: Not on file     Family History   Problem Relation Age of Onset     Heart Disease Father      Dementia Mother      Other - See Comments Daughter         living in [facility]     Other - See Comments Brother      Other - See Comments Brother      Other - See Comments Sister      Other - See Comments Sister      Other - See Comments Sister      Parkinsonism Paternal Uncle      Parkinsonism Other         paternal great grand mother     Other - See Comments Grandchild         mora's son     Other - See Comments Grandchild         mora's son     Current Outpatient Medications   Medication Sig Dispense Refill     acetaminophen (TYLENOL) 325 MG tablet Take 650 mg by mouth every 4 hours as needed for mild pain       ASPIRIN 81 PO 81mg oral daily       carbidopa-levodopa ER (RYTARY) 61. MG CPCR per CR capsule 2 capsules by mouth 3/day at 8am, 2pm and 8pm = 6/day 540 capsule 3     cetirizine (ZYRTEC) 5 MG tablet TAKE 1 TAB BY MOUTH ONCE DAILY       citalopram (CELEXA) 20 MG tablet 20mg tab daily       donepezil (ARICEPT) 10 MG tablet Take 1 tablet (10 mg) by mouth At Bedtime 90 tablet 3     finasteride (PROSCAR) 5 MG tablet 5mg tab by mouth daily @8/9am 30 tablet 3     fluocinonide (LIDEX) 0.05 % external solution        fluticasone (FLONASE) 50 MCG/ACT nasal spray Spray 2 sprays into both nostrils daily       ketoconazole (NIZORAL) 2 % external cream Apply topically as needed for itching       ketoconazole (NIZORAL) 2 % external shampoo Apply topically daily as needed for itching or irritation       latanoprost (XALATAN) 0.005 % ophthalmic solution One drop in both eyes @ 9pm 2.5 mL 1     melatonin 5 MG tablet TAKE 1  TAB BY MOUTH AT BEDTIME FOR INSOMNIA       OLANZapine (ZYPREXA) 2.5 MG tablet Reduce to 1/2 of 2.5mg tab at night and if needed can increase to 2.5mg at night 30 tablet 11     OLANZapine (ZYPREXA) 5 MG tablet Stop this dose and use the 2.5mg prescription. 30 tablet 5     polyvinyl alcohol (LIQUIFILM TEARS) 1.4 % ophthalmic solution INSTILL 1 DROP IN BOTH EYES FOUR TIMES DAILY       QUEtiapine (SEROQUEL) 100 MG tablet TAKE 1 TABLET (100 MG) BY MOUTH AT BEDTIME 30 tablet 5     QUEtiapine (SEROQUEL) 25 MG tablet Take 25 mg by mouth daily as needed       senna-docusate (SENOKOT-S/PERICOLACE) 8.6-50 MG tablet Take 1 tablet by mouth 2 times daily       tamsulosin (FLOMAX) 0.4 MG capsule Take 1 capsule (0.4 mg) by mouth daily       timolol maleate (TIMOPTIC) 0.5 % ophthalmic solution 1 drop right eye twice daily @ 9am and 9pm and 1 drop left eye only in morning at 9am       Vitamin D3 (VITAMIN D) 10 MCG (400 UNIT) tablet TAKE 5 TABS (2000IU) BY MOUTH DAILY       VITAMIN D3 50 MCG (2000 UT) tablet Take 1 tablet by mouth daily at 2 pm

## 2023-01-13 NOTE — PATIENT INSTRUCTIONS
Medications     8/9am 3pm 9pm      Acetaminophen tylenol 325mg  2 prn         Aspirin 81mg below         Carbidopa-levodopa ER rytary 245mg 2 2 2      Cetirizine zyrtec 5mg 1         Citalopram celexa  20mg  1         clozapine fazaclo 12.5mg stopped         Donepezil aricept 10mg     1     Finasteride proscar 5mg 1         Fluocinonide lidex 0.05% solution       Fluticasone flonase 50mg/act spray        Ketoconazole nizoral 2% cream daily          Ketoconazole nizoral 2% shampoo 2-3/week          Latanoprost xalatan 0.005% opthalmic solution       Both       Melatonin 5mg     1     Olanzazpine zyprexa 2.5mg   1/2->1    Olanzapine zyprexa 5mg Off       Polyvinyl alcohol liquifilm tears 1.4% solution           Quetiapine seroquel 100mg   1     Quetiapine seroquel 25mg  prn         Senna-docusate Senokot-S 8.6-50 1   1     SM ASpirin 81mg low dose 1         Tamsulosin flomax 0.4mg        1      Timolol timoptic 0.5% ophthalmic solution Both     right       Vitamin D3 10mcg 400 units 5            Vitamin D3 50mcg 2000 units                          Plan:    Ongoing fall issues - in living rooms; may be related to fixation on something  He may fall without an assistive device   Finished PT a few weeks  Has a new walker and walks better  Spending more time in a wheelchair    Ongoing fixation on various things that may precipitate a fall  He has more problems in the mornings over the past week  Falls are in the afternoon in living area  Will increase his zyprexa from 1/2 of 2.5mg to 2.5mg at night  He will remain on seroquel/quetiapine 100mg at night scheduled with 25mg dose as needed    Alejandra - nurse manager  [email or web address]  [phone] for check in    Encouraged him to get help walking    Stated that he cannot resume driving    Return in 6 months

## 2023-01-13 NOTE — LETTER
1/13/2023       RE: Eamon Whalen  [address]  [place] [address]     Dear Colleague,    Thank you for referring your patient, Eamon Whalen, to the [facility] NEUROLOGY CLINIC [place] [facility]. Please see a copy of my visit note below.        VIDEO VISIT    Date of Visit: January 13, 2023  Name: Eamon Whalen  Date of Birth 1955    [id number]    [address] [place] [address]  [phone] (M)  [phone] (H)  [email or web address]     Marlin Treviño daughter - alejandro username 1982  [id number] mobile     Christopher Mckeon sister     younger sister in [place]  [phone]     Zully - visiting once per week to fill medications  [phone]     People involved in care.   Christopher Mckeon His sister - phone and Joe her  - [phone]  Lives a few miles away. Someone is checking on him every day.   Pee Whalen - brother     [email or web address]  [phone]     [email or web address]   Trista phone number is [phone]     Verify [phone],      [email or web address]     [facility]  [address]  [address]     [facility] physicians  KAYY Teran, VERONICA  [email or web address]  [facility], Primary Care  [facility] [place]     Nurse Thi Graves     Sister is granted medical decision making ability today as Eamon is not able to make decisions all the time and as discussed with Eamon, his sister and nurse that she should be making decisions by default taking in account his opinion as best can be done. For now he will remain in his home with services but this may change in the future     Verify [phone] ext [phone], email link to [email or web address]     Jose Alfredo Knutson and Christopher (sister)   Marlin and Christopher are POA - medical  Brother driss is financial POA    from [place]      Weekly nursing visit  Home health aide 2/week  PCA - not sure how frequently; not M-F    [email or web address]  Alejandra    Sister Christopher reid visits when she can - her spouse is paulina and they are nearby  Daughter Marlin Treviño and son in law visit - edwin    Sister Cheyenne  Sister Tammie  Brother Sergio  Brother Pee     Assessment:  (G20) Parkinson disease (H)  (primary encounter diagnosis) - seen by me before 2008  Carbidopa/levodopa rytary 245mg 2 tabs 3/day at 8am, 2pm and 8pm  Ropinirole requip ER 2mg 24 hr daily - stopped     Review of diagnosis    Parkinson with dementia  Duration 14  Years or so -  Now about 15 years      Avoidance of dopamine blockers   seroquel     Motor complication review   More freezing  Problems feeding himself - adaptive equipment to allow himself to feed  He is bent over.      Review of Impulse control disorders   Scissoring of gait, running down hallways, walking without his walker     Review of surgical or medication options    No change in rytary      Will get a ustep walker with laser      Gait/Balance/Falls   11 falls since 2022   He has an assortment of falls - found on floor  Some of them may have been related to falling or he was looking for things on the floor  Total were 6 between 10th and 14th of august     Exercise/Therapy performed/offered   Does group activity - music therapy 2/week     Cognitive/Driving   Has confusion and dementia  Donepezil aricept 10mg daily  Rivastigmine/exelon - not using  Not always using a walker.   Delusions  Hallucinations - visual and tactile - petting the bunnies that are visiting nightly   Can be aggressive  Sexual - someone is trying to harm his daughter and ruin his Shinto family  Thinks he needs to help his family     Mood   Citalopram celexa 10mg   [facility] care providers   Lesa Randle ([facility])   He has not had a dose increase      Hallucinations/delusions   Clozapine fazaclo 12.5mg -  stopped  Olanzapine zyprexa 5mg  - taking 2.5 at bedtime   Quetiapine seroquel 100mg at 9pm  Quetiapine seroquel 25mg - stopped    Seems like it is time to put Eamon on clozapine (clozaril)  Which is more effective than seroquel in the management of his delusions/hallucinations/psychosis. Will need blue stone to manage t his on site  Typical starting dose of clozapine is 1/2 of 25mg at night with dose increase. Would have him remain on seroquel while clozapine is being titrated upwards. Clozapine needs weekly blood monitoring for neutropenia for 1st 6 months then biweekly for second 6 months then monthly thereafter I think.      He had a urinalysis that ruled out a urinary tract infection.       Thinking there are students there who are smoking marijuana cigarettes and sneaking in windows  Has some (possible delusions) about lingering problems from mold.      seroquel working and they are managing the delusions with distraction and calming strategies, etc.   Interventions are helpful      See above    Would reduce the olanzapine to 1/2 of 2.5mg tablets as he had a number of falls that may be related to the olanzapine and would not stop it entirely as it helped his psychosis. He also has had a decline in his mobility which we hope will improve with the olanzapine (zyprexa) dose reduction.      Prescription updated - stopped the 5mg tablet and prescribed a 2.5mg tab     Sleep   Sleeping okay without melatonin  Melatonin 5mg at bedtime - been off for the past 2 weeks      Bladder/Renal/Prostate/Gyn/Other  Nocturia better - only 2/noc  Finasteride proscar 5mg daily   mirabegron myrbetriq 25mg daily - not taking  tamsulosin flomax 0.4mg - daily     No recent bladder infection     Dr goldberg   Elevated psa  psa has dropped with injections  Urgency has improved      GI/Constipation/GERD   Not choking  Constipation is managed with senna     ENDO/Lipid/DM/Bone density/Thyroid  Cholecalciferol vitamin d3 50 mcg (2000  units) daily     Cardio/heart/Hyper or Hypotensive   Blood pressure has been higher when falling  No recent low blood presures  Has had low blood pressure issues in th e past    Would need daily blood pressure measurements to make sure his bp is not too llow if starting on clozapine which can drop his blood pressure      Vision/Dry Eyes/Cataracts/Glaucoma/Macular   Squinting - presumed due to dry eyes; 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 He may also have blepharospasm that could benefit from botox injection.       Heme/Anticoagulation/Antiplatelet/Anemia/Other  Aspirin 81mg daily     ENT/Resp  Fluticasone flonase 50 mcg/act nasal spray - not using   Dental appointment   5 teeth removed in past and had been on narcotics      Skin/Cancer/Seborrhea/other  Ketoconazole cream nizoral 2% cream -using   Ketoconazole shampoo 2%  - using  Mometasone elocon 0.1% cream     Musculoskeletal/Pain/Headache      ACETAMINOPHEN TYLENOL 325MG as needed     Other:      Medications     8/9am 3pm 9pm      Acetaminophen tylenol 325mg  2 prn         Aspirin 81mg below         Carbidopa-levodopa ER rytary 245mg 2 2 2      Cetirizine zyrtec 5mg 1         Citalopram celexa  20mg  1         clozapine fazaclo 12.5mg stopped         Donepezil aricept 10mg     1     Finasteride proscar 5mg 1         Fluocinonide lidex 0.05% solution       Fluticasone flonase 50mg/act spray        Ketoconazole nizoral 2% cream daily          Ketoconazole nizoral 2% shampoo 2-3/week          Latanoprost xalatan 0.005% opthalmic solution       Both       Melatonin 5mg     1     Olanzazpine zyprexa 2.5mg   1/2->1    Olanzapine zyprexa 5mg Off        Polyvinyl alcohol liquifilm tears 1.4% solution           Quetiapine seroquel 100mg   1     Quetiapine seroquel 25mg  prn         Senna-docusate Senokot-S 8.6-50 1   1     SM ASpirin 81mg low dose 1         Tamsulosin flomax 0.4mg        1      Timolol timoptic 0.5% ophthalmic solution Both     right       Vitamin D3 10mcg 400 units 5            Vitamin D3 50mcg 2000 units                          Plan:    Ongoing fall issues - in living rooms; may be related to fixation on something  He may fall without an assistive device   Finished PT a few weeks  Has a new walker and walks better  Spending more time in a wheelchair    Ongoing fixation on various things that may precipitate a fall  He has more problems in the mornings over the past week  Falls are in the afternoon in living area  Will increase his zyprexa from 1/2 of 2.5mg to 2.5mg at night  He will remain on seroquel/quetiapine 100mg at night scheduled with 25mg dose as needed    Alejandra - nurse manager  [email or web address]  [phone] for check in    Encouraged him to get help walking    Stated that he cannot resume driving    Return in 6 months    Medical Decision Making:  #  Chronic progressive medical conditions addressed  Delusion, parkinson  Review and/or interpretation of unique test or documentation from a provider outside of neurology no   Independent historian provided additional details  yes   Prescription drug management and review of potential side effects and/or monitoring for side effects  yes   Health impacted by social determinants of health  Yes - facility.     I have reviewed the note as documented above.  This accurately captures the substance of my conversation with the patient and total time spent preparing for visit, executing visit and completing visit on the day of the visit:  30 minutes.     Edison Villasenor  "MD      ------------------------------------------------------------------------------------------------------------------------------------------------------------------------    Video-Visit Details    The patient has been notified of following:     \"After a review of the patient s situation, this visit was changed from an in-person visit to a video visit to reduce the risk of COVID-19 exposure.   The patient is being evaluated via a billable video visit.\"    \"This video visit will be conducted via a call between you and your physician/provider. We have found that certain health care needs can be provided without the need for an in-person physical exam.  This service lets us provide the care you need with a video conversation.  If a prescription is necessary we can send it directly to your pharmacy.  If lab work is needed we can place an order for that and you can then stop by our lab to have the test done at a later time.    If during the course of the call the physician/provider feels a video visit is not appropriate, you will not be charged for this service.\"     Patient has given verbal consent for Video visit? Yes    Patient would like the video invitation sent by:     Type of service:  Video Visit    Video Start Time:     Video End Time (time video stopped):     Duration:  minutes - see above    Originating Location (pt. Location):     Distant Location (provider location):  [facility] NEUROLOGY [facility]     Mode of Communication:  Video Conference via [employer] (and if not possible then doximity)      Edison Villasenor MD      --------------------------------------------------------------------------------------------------------------    Eamon Whalen is a 67 year old male who is being evaluated via a billable video visit.      Charts reviewed  Consult from  Images reviewed        I have reviewed and updated the patient's Past Medical History, Social History, Family History and Medication " List.    ALLERGIES  Cats and Dogs    Lasts visit details if there was a last visit:       14 Review of systems  are negative except for   Patient Active Problem List   Diagnosis     Paralysis agitans (H)     Wears glasses     Balance problems     Constipation     GERD (gastroesophageal reflux disease)     Urinary urgency     Insomnia     Seborrhea     Lumbago     Hearing loss     Family history of Parkinson's disease     Central retinal vein occlusion     Choroidal nevus     Vision problem -- Right Eye     ED (erectile dysfunction)     Other specified glaucoma     Abnormal electrocardiogram     Bifascicular block     Prolonged Q-T interval on ECG     History of EMG     Encounter for examination for driving license     Parkinson disease (H)     Elevated prostate specific antigen (PSA)     Family history of epilepsy and other diseases of the nervous system     Gastroesophageal reflux disease     Impotence of organic origin     Other abnormalities of gait and mobility     Personal history of other medical treatment (CODE)     Benign neoplasm of choroid     Weakness     Altered mental status     Visual hallucinations     Personal history of other medical treatment     Benign prostatic hyperplasia without urinary obstruction     Lewy body dementia (H)     Repeated falls        Allergies   Allergen Reactions     Cats      Dogs      Past Surgical History:   Procedure Laterality Date     COLONOSCOPY      3/31/2021     other surgical history      prostate procedure     Past Medical History:   Diagnosis Date     Abnormal electrocardiogram 3/23/2017     Balance problems      Bifascicular block 3/23/2017     Central retinal vein occlusion 4/11/2014     Choroidal nevus 4/11/2014     Constipation      Elevated prostate specific antigen (PSA) 2/20/2018     Family history of Parkinson's disease      GERD (gastroesophageal reflux disease)      Glaucoma 6/4/2015     Hearing loss      History of EMG 2/20/2018    [facility]  [facility]  Nerve Conduction & EMG Report  Patient:       Eamon Whalen Patient ID:    [id number] Gender:        Male YOB: 1955 Age:           62 Years 2 Months    History & Examination:  62 year old man with history of Parkinson's disease, restless leg syndrome, and paresthesias in feet and legs. Query polyneuropathy. His clinical examination      Lumbago      Parkinson's disease (H)      Prolonged Q-T interval on ECG 3/23/2017     Urinary urgency      Wears glasses      Social History     Socioeconomic History     Marital status: Single     Spouse name: Not on file     Number of children: Not on file     Years of education: Not on file     Highest education level: Not on file   Occupational History     Occupation: farm     Employer: SELF EMPLOYED.   Tobacco Use     Smoking status: Never     Smokeless tobacco: Never     Tobacco comments:     cigar sometimes   Substance and Sexual Activity     Alcohol use: Yes     Comment: occ     Drug use: No     Sexual activity: Yes     Partners: Female   Other Topics Concern     Parent/sibling w/ CABG, MI or angioplasty before 65F 55M? Not Asked   Social History Narrative    1 daughter who is  and lives in [place]    She was pregnant with the 1st child and had some problems in the 6-7 month of pregnancy with     Baby that may have hydrocephalus as there was IUGR. The baby  after 3 months in 2011. She was encouraged her to undergo genetic testing which was negative and they are considering having more children.     He met a woman and may  a woman from [place] - who has [place] roots. 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 He met her 3 times. Her name is Tavia and lives in [facility]. They will be visiting within a day or so with her mother. She is 36 yrs old. She is not going back there.     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 Sold off 40 acres and moved back into the house and  "will be remodeling. Depending on the status of the main house may build new house.         [place] in his home town had parkinson    Germans lived on the other side of town        [facility] -- [place] of [place] 12 miles.         This was way before pesticides        Maternal grandfather     Dax Sorenson - Bangladeshi name     He would have to be brought out to a rocking chair    \"hardening of there arteries.         mother     Social Determinants of Health     Financial Resource Strain: Not on file   Food Insecurity: Not on file   Transportation Needs: Not on file   Physical Activity: Not on file   Stress: Not on file   Social Connections: Not on file   Intimate Partner Violence: Not on file   Housing Stability: Not on file     Family History   Problem Relation Age of Onset     Heart Disease Father      Dementia Mother      Other - See Comments Daughter         living in [facility]     Other - See Comments Brother      Other - See Comments Brother      Other - See Comments Sister      Other - See Comments Sister      Other - See Comments Sister      Parkinsonism Paternal Uncle      Parkinsonism Other         paternal great grand mother     Other - See Comments Grandchild         mora's son     Other - See Comments Grandchild         mora's son     Current Outpatient Medications   Medication Sig Dispense Refill     acetaminophen (TYLENOL) 325 MG tablet Take 650 mg by mouth every 4 hours as needed for mild pain       ASPIRIN 81 PO 81mg oral daily       carbidopa-levodopa ER (RYTARY) 61. MG CPCR per CR capsule 2 capsules by mouth 3/day at 8am, 2pm and 8pm = 6/day 540 capsule 3     cetirizine (ZYRTEC) 5 MG tablet TAKE 1 TAB BY MOUTH ONCE DAILY       citalopram (CELEXA) 20 MG tablet 20mg tab daily       donepezil (ARICEPT) 10 MG tablet Take 1 tablet (10 mg) by mouth At Bedtime 90 tablet 3     finasteride (PROSCAR) 5 MG tablet 5mg tab by mouth daily @8/9am 30 tablet 3     fluocinonide (LIDEX) 0.05 " % external solution        fluticasone (FLONASE) 50 MCG/ACT nasal spray Spray 2 sprays into both nostrils daily       ketoconazole (NIZORAL) 2 % external cream Apply topically as needed for itching       ketoconazole (NIZORAL) 2 % external shampoo Apply topically daily as needed for itching or irritation       latanoprost (XALATAN) 0.005 % ophthalmic solution One drop in both eyes @ 9pm 2.5 mL 1     melatonin 5 MG tablet TAKE 1 TAB BY MOUTH AT BEDTIME FOR INSOMNIA       OLANZapine (ZYPREXA) 2.5 MG tablet Reduce to 1/2 of 2.5mg tab at night and if needed can increase to 2.5mg at night 30 tablet 11     OLANZapine (ZYPREXA) 5 MG tablet Stop this dose and use the 2.5mg prescription. 30 tablet 5     polyvinyl alcohol (LIQUIFILM TEARS) 1.4 % ophthalmic solution INSTILL 1 DROP IN BOTH EYES FOUR TIMES DAILY       QUEtiapine (SEROQUEL) 100 MG tablet TAKE 1 TABLET (100 MG) BY MOUTH AT BEDTIME 30 tablet 5     QUEtiapine (SEROQUEL) 25 MG tablet Take 25 mg by mouth daily as needed       senna-docusate (SENOKOT-S/PERICOLACE) 8.6-50 MG tablet Take 1 tablet by mouth 2 times daily       tamsulosin (FLOMAX) 0.4 MG capsule Take 1 capsule (0.4 mg) by mouth daily       timolol maleate (TIMOPTIC) 0.5 % ophthalmic solution 1 drop right eye twice daily @ 9am and 9pm and 1 drop left eye only in morning at 9am       Vitamin D3 (VITAMIN D) 10 MCG (400 UNIT) tablet TAKE 5 TABS (2000IU) BY MOUTH DAILY       VITAMIN D3 50 MCG (2000 UT) tablet Take 1 tablet by mouth daily at 2 pm             Eamon is a 67 year old who is being evaluated via a billable video visit.      How would you like to obtain your AVS? MyChart  If the video visit is dropped, the invitation should be resent by: Send to e-mail at: [email or web address]  Will anyone else be joining your video visit? No        Video-Visit Details    Type of service:  Video Visit   Video Start Time:   Video End Time:    Originating Location (pt. Location): Home    Distant Location  (provider location):  Off-site  Platform used for Video Visit: Luis Angel        Again, thank you for allowing me to participate in the care of your patient.      Sincerely,    Edison Villasenor MD

## 2023-01-13 NOTE — LETTER
Date:January 16, 2023      Patient was self referred, no letter generated. Do not send.        [facility] Health Information

## 2023-01-13 NOTE — PROGRESS NOTES
Eamon is a 67 year old who is being evaluated via a billable video visit.      How would you like to obtain your AVS? MyChart  If the video visit is dropped, the invitation should be resent by: Send to e-mail at: [email or web address]  Will anyone else be joining your video visit? No        Video-Visit Details    Type of service:  Video Visit   Video Start Time:   Video End Time:    Originating Location (pt. Location): Home    Distant Location (provider location):  Off-site  Platform used for Video Visit: [employer]

## 2023-01-16 NOTE — TELEPHONE ENCOUNTER
[facility] Call Center    Phone Message    May a detailed message be left on voicemail: yes     Reason for Call: Medication Question or concern regarding medication   Prescription Clarification  Name of Medication: OLANZapine (ZYPREXA) 2.5 MG tablet  Prescribing Provider: Dr. Villasenor   Pharmacy: [id number] [address]   What on the order needs clarification? Alejandra from [facility] in [place] is calling because the Pt hd an appt with Dr. Villasenor and they have not received the new PRN for this medication. She is needing a new script sent off to the pharmacy and she states the directions are not clear      Please call Alejandra with any questions      Action Taken: Message routed to:  [facility] Neurology    Travel Screening: Not Applicable

## 2023-02-07 ENCOUNTER — TELEPHONE (OUTPATIENT)
Dept: NEUROLOGY | Facility: CLINIC | Age: 68
End: 2023-02-07

## 2023-02-07 NOTE — TELEPHONE ENCOUNTER
[facility] Call Center    Phone Message    May a detailed message be left on voicemail: yes     Reason for Call: Other: Pt sister is calling trying to talk to Jeaneth, she missed her call and would like Jeaneth to call her mobile phone.     Action Taken: Message routed to:  [facility] Neurology    Travel Screening: Not Applicable

## 2023-02-07 NOTE — TELEPHONE ENCOUNTER
On  at 4:00 pm patient fell out of his chair. He had trouble breathing and was unresponsive to staff. 911 was called, they did not do CPR. Patient  at [facility] in [place] on .     Writer left voice mail with sister Christopher to call when she can.

## 2023-02-08 NOTE — TELEPHONE ENCOUNTER
"Spoke to Christopher.    She got a call from the facility between 3:30-4:00 on Sunday. They told her, \"we did CPR, ems is here but he is unresponsive.\"    He had a massive heart attack. He never wrote anything about a DNR so CPR was done three different time.    His Parkinson's disease was getting so bad. She is sure he did not have any pain. Family is doing fine.  "

## 2023-02-09 ENCOUNTER — PATIENT OUTREACH (OUTPATIENT)
Dept: NEUROLOGY | Facility: CLINIC | Age: 68
End: 2023-02-09
Payer: COMMERCIAL

## 2023-02-09 NOTE — PROGRESS NOTES
Received Notification of a , form was completed and emailed to [email or web address]

## 2023-08-31 NOTE — TELEPHONE ENCOUNTER
[facility] Call Center    Phone Message    May a detailed message be left on voicemail: yes     Reason for Call: Other: Copy of the visit notes from Oct 2020 appt and did Dr. Villasenor still want an EKG done for this pt?  Please call Angella to let her know.   FAX# at [facility]  [phone]    Action Taken: Message routed to:  [facility]    Travel Screening: Not Applicable

## 2025-05-21 NOTE — TELEPHONE ENCOUNTER
[facility] Call [facility]    Phone Message    May a detailed message be left on voicemail: yes    Reason for Call: Medication Refill Request    Has the patient contacted the pharmacy for the refill? Yes   Name of medication being requested: carbidopa-levodopa (RYTARY) 48. MG CPCR per CR capsule  Provider who prescribed the medication: Dr. Villasenor  Pharmacy: [facility]  Date medication is needed: ASAP, pt is out.     Eamon called in, says he went to  script and pharmacy was not able to fill. Said there was a formulary issue with the insurance. Please give pt a call back to discuss. Thank you.      Action Taken: Message routed to:  [facility] ([facility] Neurology    
carbidopa-levodopa (RYTARY) 48. MG CPCR per CR capsule 270 capsule 11 11/8/2018  No   Sig: 3 capsule by mouth 3 times per day @ 9am, 3pm and 9pm  = 9/day   -Completely out, stated insurance is not covering it anymore.    I informed Eamon that I will get a prior authorization sent in and our prior authorization team will give him a call once it is approved.      
 used